# Patient Record
Sex: MALE | Race: ASIAN | Employment: UNEMPLOYED | URBAN - METROPOLITAN AREA
[De-identification: names, ages, dates, MRNs, and addresses within clinical notes are randomized per-mention and may not be internally consistent; named-entity substitution may affect disease eponyms.]

---

## 2017-01-12 ENCOUNTER — LAB ENCOUNTER (OUTPATIENT)
Dept: LAB | Facility: HOSPITAL | Age: 52
End: 2017-01-12
Attending: NURSE PRACTITIONER
Payer: COMMERCIAL

## 2017-01-12 DIAGNOSIS — Z87.19 PERSONAL HISTORY OF DIGESTIVE DISEASE: ICD-10-CM

## 2017-01-12 DIAGNOSIS — E11.9 TYPE II OR UNSPECIFIED TYPE DIABETES MELLITUS WITHOUT MENTION OF COMPLICATION, NOT STATED AS UNCONTROLLED: ICD-10-CM

## 2017-01-12 DIAGNOSIS — R10.9 ABDOMINAL PAIN, UNSPECIFIED SITE: Primary | ICD-10-CM

## 2017-01-12 LAB
ALBUMIN SERPL BCP-MCNC: 3.6 G/DL (ref 3.5–4.8)
ALBUMIN/GLOB SERPL: 1.3 {RATIO} (ref 1–2)
ALP SERPL-CCNC: 60 U/L (ref 32–100)
ALT SERPL-CCNC: 24 U/L (ref 17–63)
AMYLASE SERPL-CCNC: 35 U/L (ref 24–108)
ANION GAP SERPL CALC-SCNC: 7 MMOL/L (ref 0–18)
AST SERPL-CCNC: 19 U/L (ref 15–41)
BASOPHILS # BLD: 0 K/UL (ref 0–0.2)
BASOPHILS NFR BLD: 0 %
BILIRUB SERPL-MCNC: 0.7 MG/DL (ref 0.3–1.2)
BUN SERPL-MCNC: 10 MG/DL (ref 8–20)
BUN/CREAT SERPL: 12.2 (ref 10–20)
CALCIUM SERPL-MCNC: 9.1 MG/DL (ref 8.5–10.5)
CHLORIDE SERPL-SCNC: 106 MMOL/L (ref 95–110)
CHOLEST SERPL-MCNC: 155 MG/DL (ref 110–200)
CO2 SERPL-SCNC: 28 MMOL/L (ref 22–32)
CREAT SERPL-MCNC: 0.82 MG/DL (ref 0.5–1.5)
CREAT UR-MCNC: 206.9 MG/DL
EOSINOPHIL # BLD: 0.2 K/UL (ref 0–0.7)
EOSINOPHIL NFR BLD: 3 %
ERYTHROCYTE [DISTWIDTH] IN BLOOD BY AUTOMATED COUNT: 12.8 % (ref 11–15)
GLOBULIN PLAS-MCNC: 2.8 G/DL (ref 2.5–3.7)
GLUCOSE SERPL-MCNC: 132 MG/DL (ref 70–99)
HCT VFR BLD AUTO: 45 % (ref 41–52)
HDLC SERPL-MCNC: 23 MG/DL
HGB BLD-MCNC: 15.6 G/DL (ref 13.5–17.5)
LDLC SERPL CALC-MCNC: 58 MG/DL (ref 0–99)
LIPASE SERPL-CCNC: 29 U/L (ref 22–51)
LYMPHOCYTES # BLD: 1.9 K/UL (ref 1–4)
LYMPHOCYTES NFR BLD: 36 %
MCH RBC QN AUTO: 31.8 PG (ref 27–32)
MCHC RBC AUTO-ENTMCNC: 34.7 G/DL (ref 32–37)
MCV RBC AUTO: 91.8 FL (ref 80–100)
MICROALBUMIN UR-MCNC: 4.8 MG/DL (ref 0–1.8)
MICROALBUMIN/CREAT UR: 23.2 MG/G{CREAT} (ref 0–20)
MONOCYTES # BLD: 0.6 K/UL (ref 0–1)
MONOCYTES NFR BLD: 10 %
NEUTROPHILS # BLD AUTO: 2.7 K/UL (ref 1.8–7.7)
NEUTROPHILS NFR BLD: 51 %
NONHDLC SERPL-MCNC: 132 MG/DL
OSMOLALITY UR CALC.SUM OF ELEC: 293 MOSM/KG (ref 275–295)
PLATELET # BLD AUTO: 169 K/UL (ref 140–400)
PMV BLD AUTO: 10 FL (ref 7.4–10.3)
POTASSIUM SERPL-SCNC: 4.2 MMOL/L (ref 3.3–5.1)
PROT SERPL-MCNC: 6.4 G/DL (ref 5.9–8.4)
PSA FREE MFR SERPL: 55 %
PSA FREE SERPL-MCNC: 0.11 NG/ML
PSA SERPL-MCNC: 0.2 NG/ML (ref 0–4)
RBC # BLD AUTO: 4.9 M/UL (ref 4.5–5.9)
SODIUM SERPL-SCNC: 141 MMOL/L (ref 136–144)
TRIGL SERPL-MCNC: 371 MG/DL (ref 1–149)
WBC # BLD AUTO: 5.3 K/UL (ref 4–11)

## 2017-01-12 PROCEDURE — 85025 COMPLETE CBC W/AUTO DIFF WBC: CPT

## 2017-01-12 PROCEDURE — 36415 COLL VENOUS BLD VENIPUNCTURE: CPT

## 2017-01-12 PROCEDURE — 80053 COMPREHEN METABOLIC PANEL: CPT

## 2017-01-12 PROCEDURE — 82570 ASSAY OF URINE CREATININE: CPT

## 2017-01-12 PROCEDURE — 82150 ASSAY OF AMYLASE: CPT

## 2017-01-12 PROCEDURE — 84153 ASSAY OF PSA TOTAL: CPT

## 2017-01-12 PROCEDURE — 83690 ASSAY OF LIPASE: CPT

## 2017-01-12 PROCEDURE — 82043 UR ALBUMIN QUANTITATIVE: CPT

## 2017-01-12 PROCEDURE — 84154 ASSAY OF PSA FREE: CPT

## 2017-01-12 PROCEDURE — 80061 LIPID PANEL: CPT

## 2017-01-16 ENCOUNTER — TELEPHONE (OUTPATIENT)
Dept: GASTROENTEROLOGY | Facility: CLINIC | Age: 52
End: 2017-01-16

## 2017-01-16 ENCOUNTER — OFFICE VISIT (OUTPATIENT)
Dept: GASTROENTEROLOGY | Facility: CLINIC | Age: 52
End: 2017-01-16

## 2017-01-16 VITALS
HEART RATE: 58 BPM | SYSTOLIC BLOOD PRESSURE: 130 MMHG | DIASTOLIC BLOOD PRESSURE: 79 MMHG | WEIGHT: 185 LBS | BODY MASS INDEX: 32.78 KG/M2 | HEIGHT: 63 IN

## 2017-01-16 DIAGNOSIS — K85.90 RECURRENT ACUTE PANCREATITIS: Primary | ICD-10-CM

## 2017-01-16 DIAGNOSIS — E78.1 HYPERTRIGLYCERIDEMIA: ICD-10-CM

## 2017-01-16 PROCEDURE — 99214 OFFICE O/P EST MOD 30 MIN: CPT | Performed by: INTERNAL MEDICINE

## 2017-01-16 PROCEDURE — 99212 OFFICE O/P EST SF 10 MIN: CPT | Performed by: INTERNAL MEDICINE

## 2017-01-16 RX ORDER — POLYETHYLENE GLYCOL 3350 17 G/17G
POWDER, FOR SOLUTION ORAL
COMMUNITY
Start: 2017-01-11 | End: 2018-09-17

## 2017-01-16 NOTE — TELEPHONE ENCOUNTER
Per Min Hendrickson, pt is scheduled for MRI/MRCP tomorrow at [facility] at 36, arrival time 1615; pt needs to be NPO for 4 hours prior to exam; he is made aware of this as well as ADO's address; pt is agreeable with plan.

## 2017-01-16 NOTE — PROGRESS NOTES
HPI:    Patient ID: Jignesh Falcon returns today for follow-up. He suffered another acute episode of the pain 1 week ago January 9th.   He describes the abrupt onset of upper abdominal pain, fairly diffuse upper abdominal and periumbilical radiating day.  Does not regularly eat fish. Has stopped eating all meats including chicken. Very attentive to regulating his diet. Some nausea associated with this pain. Sometimes eats dried pierre to relieve the nausea and/or pain.     Pain only occurs postpr atherosclerosis. POSTOPERATIVE DIAGNOSES:  1. Diffuse coronary artery disease. 2. Acute coronary syndrome. 3. Coronary atherosclerosis.      PROCEDURE:  Open heart coronary bypass grafting x 3 with left internal  mammary artery to left anterior descen dissection. RETROPERITONEUM: No mass or enlarged adenopathy.   BOWEL/MESENTERY: Mild residual free fluid in the peripancreatic region adjacent to the descending  duodenum extending into the right anterior pararenal space measures 3.3 x 1.0 cm in  No visibl pancreatitis attributed to hypertriglyceridemia. Mr. Rooney Lanes has been at home on what he believes to be his same list of  medications since the heart surgery. He has been doing his exercises on a  daily basis.   He was doing well up until this morn PHYSICAL EXAMINATION:  GENERAL APPEARANCE:  Reveals a pleasant, jovial gentleman ambulating around  the room. He looks well. He is afebrile since admission. VITAL SIGNS:  Show heart rate 90s on arrival, 70s now with stable blood  pressure.   HEENT EXAM: be rapidly improving. I  would recommend the followin. Clear-liquid diet. 2.  IV fluid hydration as tolerated. 3.  Follow triglycerides, lipids. 4.  Lipid clinic followup for medication monitoring and adjustment after  this admission.   5.  Repeat steatosis. GALLBLADDER: Present. BILIARY: No visible dilatation or calcification. SPLEEN: No enlargement or focal lesion. STOMACH: No gross gastric mass, obstruction or focal abnormality. Duodenum  unremarkable.   PANCREAS: No lesion, fluid collection, limited due to the patient's  immobility. FINDINGS:  GALLBLADDER: Mildly distended. Multiple sludge with possible tiny  embedded stones. No wall thickening or pericholecystic fluid. Sonographic Briceno's sign was not elicited.   BILE DUCTS: Normal. Common infiltration, or hepatic parenchymal disease. Normal liver size. No focal mass. GALLBLADDER:           Normal.  Normal size.  No calculi, wall thickening or pericholecystic fluid.  Sonographic Briceno's sign was not elicited.     BILE DUCTS:    Normal.  Com Prescriptions:  Polyethylene Glycol 3350 Oral Powder  Disp:  Rfl:    lisinopril 10 MG Oral Tab Take 10 mg by mouth once daily. Disp:  Rfl: 0   Atorvastatin Calcium 40 MG Oral Tab Take 1 tablet (40 mg total) by mouth nightly.  Disp: 30 tablet Rfl: 11   Blood shows WBC 5300 hemoglobin 15.6 g, platelets 770,722 normal differential  CMP with glucose 132 normal renal function AST 19 ALT 24 alk phos 60 albumin 3.6    Lipid panel shows total cholesterol 155, triglycerides 371, LDL 58    Similar normal CMP October 13 follows with with Dr. Vince Chinchilla of Cardiology. 2.  Continue current diet and lipid-lowering therapy for the severe dyslipidemia.        Atorvastatin increased to 40 mg daily (from 20mg) November 23, 2016.    3.  I had previously discussed cholecystec

## 2017-01-16 NOTE — TELEPHONE ENCOUNTER
Rosenda Yañez in central scheduling dept at [id number] is contacted re: below; CT is cancelled and she will call back in a few minutes with MRI appt.; pt is agreeable with this and is waiting in waiting room for time/date for MRI.

## 2017-01-17 ENCOUNTER — HOSPITAL ENCOUNTER (OUTPATIENT)
Dept: MRI IMAGING | Age: 52
Discharge: HOME | End: 2017-01-17
Attending: INTERNAL MEDICINE
Payer: COMMERCIAL

## 2017-01-17 DIAGNOSIS — K85.90 RECURRENT ACUTE PANCREATITIS: ICD-10-CM

## 2017-01-24 ENCOUNTER — HOSPITAL ENCOUNTER (OUTPATIENT)
Dept: MRI IMAGING | Facility: HOSPITAL | Age: 52
Discharge: HOME | End: 2017-01-24
Attending: INTERNAL MEDICINE
Payer: COMMERCIAL

## 2017-01-24 DIAGNOSIS — K85.90 RECURRENT ACUTE PANCREATITIS: ICD-10-CM

## 2017-01-24 PROCEDURE — 74183 MRI ABD W/O CNTR FLWD CNTR: CPT

## 2017-01-24 PROCEDURE — A9575 INJ GADOTERATE MEGLUMI 0.1ML: HCPCS | Performed by: INTERNAL MEDICINE

## 2017-01-24 PROCEDURE — 76376 3D RENDER W/INTRP POSTPROCES: CPT

## 2017-02-08 ENCOUNTER — TELEPHONE (OUTPATIENT)
Dept: GASTROENTEROLOGY | Facility: CLINIC | Age: 52
End: 2017-02-08

## 2017-02-09 NOTE — TELEPHONE ENCOUNTER
OK to leave detailed message per patient consent. Left detailed message regarding the message below. Pt told to call back if he had any other questions/concerns.

## 2017-02-10 NOTE — TELEPHONE ENCOUNTER
Pt contacted and reviewed results in detail with pt. Asked specifically regarding kidneys - benign cysts. Reviewed continuing on cholesterol medication.  He is agreeable with plan

## 2017-09-02 ENCOUNTER — LAB ENCOUNTER (OUTPATIENT)
Dept: LAB | Facility: HOSPITAL | Age: 52
End: 2017-09-02
Attending: NURSE PRACTITIONER
Payer: COMMERCIAL

## 2017-09-02 DIAGNOSIS — E78.5 HYPERLIPIDEMIA: Primary | ICD-10-CM

## 2017-09-02 LAB
CHOLEST SERPL-MCNC: 196 MG/DL (ref 110–200)
HDLC SERPL-MCNC: 25 MG/DL
NONHDLC SERPL-MCNC: 171 MG/DL
TRIGL SERPL-MCNC: 729 MG/DL (ref 1–149)

## 2017-09-02 PROCEDURE — 36415 COLL VENOUS BLD VENIPUNCTURE: CPT

## 2017-09-02 PROCEDURE — 80061 LIPID PANEL: CPT

## 2017-09-06 ENCOUNTER — HOSPITAL ENCOUNTER (EMERGENCY)
Facility: HOSPITAL | Age: 52
Discharge: HOME | End: 2017-09-07
Attending: EMERGENCY MEDICINE
Payer: COMMERCIAL

## 2017-09-06 DIAGNOSIS — R10.9 RIGHT FLANK PAIN: Primary | ICD-10-CM

## 2017-09-06 PROCEDURE — 96361 HYDRATE IV INFUSION ADD-ON: CPT

## 2017-09-06 PROCEDURE — 80048 BASIC METABOLIC PNL TOTAL CA: CPT | Performed by: EMERGENCY MEDICINE

## 2017-09-06 PROCEDURE — 83690 ASSAY OF LIPASE: CPT | Performed by: EMERGENCY MEDICINE

## 2017-09-06 PROCEDURE — 80076 HEPATIC FUNCTION PANEL: CPT | Performed by: EMERGENCY MEDICINE

## 2017-09-06 PROCEDURE — 85379 FIBRIN DEGRADATION QUANT: CPT | Performed by: EMERGENCY MEDICINE

## 2017-09-06 PROCEDURE — 96375 TX/PRO/DX INJ NEW DRUG ADDON: CPT

## 2017-09-06 PROCEDURE — 96374 THER/PROPH/DIAG INJ IV PUSH: CPT

## 2017-09-06 PROCEDURE — 85025 COMPLETE CBC W/AUTO DIFF WBC: CPT | Performed by: EMERGENCY MEDICINE

## 2017-09-06 PROCEDURE — 99284 EMERGENCY DEPT VISIT MOD MDM: CPT

## 2017-09-07 ENCOUNTER — APPOINTMENT (OUTPATIENT)
Dept: CT IMAGING | Facility: HOSPITAL | Age: 52
End: 2017-09-07
Attending: EMERGENCY MEDICINE
Payer: COMMERCIAL

## 2017-09-07 VITALS
OXYGEN SATURATION: 99 % | RESPIRATION RATE: 14 BRPM | HEART RATE: 63 BPM | SYSTOLIC BLOOD PRESSURE: 168 MMHG | WEIGHT: 180 LBS | DIASTOLIC BLOOD PRESSURE: 78 MMHG | TEMPERATURE: 97 F | BODY MASS INDEX: 31.89 KG/M2 | HEIGHT: 63 IN

## 2017-09-07 LAB
ALBUMIN SERPL BCP-MCNC: 3.8 G/DL (ref 3.5–4.8)
ALP SERPL-CCNC: 76 U/L (ref 32–100)
ALT SERPL-CCNC: 24 U/L (ref 17–63)
ANION GAP SERPL CALC-SCNC: 5 MMOL/L (ref 0–18)
AST SERPL-CCNC: 20 U/L (ref 15–41)
BASOPHILS # BLD: 0.1 K/UL (ref 0–0.2)
BASOPHILS NFR BLD: 1 %
BILIRUB DIRECT SERPL-MCNC: 0.1 MG/DL (ref 0–0.2)
BILIRUB SERPL-MCNC: 0.4 MG/DL (ref 0.3–1.2)
BILIRUB UR QL: NEGATIVE
BUN SERPL-MCNC: 11 MG/DL (ref 8–20)
BUN/CREAT SERPL: 13.9 (ref 10–20)
CALCIUM SERPL-MCNC: 9.4 MG/DL (ref 8.5–10.5)
CHLORIDE SERPL-SCNC: 101 MMOL/L (ref 95–110)
CLARITY UR: CLEAR
CO2 SERPL-SCNC: 29 MMOL/L (ref 22–32)
COLOR UR: YELLOW
CREAT SERPL-MCNC: 0.79 MG/DL (ref 0.5–1.5)
D DIMER PPP FEU-MCNC: 0.32 MCG/ML (ref ?–0.51)
EOSINOPHIL # BLD: 0.2 K/UL (ref 0–0.7)
EOSINOPHIL NFR BLD: 3 %
ERYTHROCYTE [DISTWIDTH] IN BLOOD BY AUTOMATED COUNT: 12.4 % (ref 11–15)
GLUCOSE SERPL-MCNC: 276 MG/DL (ref 70–99)
GLUCOSE UR-MCNC: >=500 MG/DL
HCT VFR BLD AUTO: 45.9 % (ref 41–52)
HGB BLD-MCNC: 16.2 G/DL (ref 13.5–17.5)
HGB UR QL STRIP.AUTO: NEGATIVE
KETONES UR-MCNC: NEGATIVE MG/DL
LEUKOCYTE ESTERASE UR QL STRIP.AUTO: NEGATIVE
LIPASE SERPL-CCNC: 27 U/L (ref 22–51)
LYMPHOCYTES # BLD: 2.9 K/UL (ref 1–4)
LYMPHOCYTES NFR BLD: 46 %
MCH RBC QN AUTO: 32.5 PG (ref 27–32)
MCHC RBC AUTO-ENTMCNC: 35.2 G/DL (ref 32–37)
MCV RBC AUTO: 92.2 FL (ref 80–100)
MONOCYTES # BLD: 0.4 K/UL (ref 0–1)
MONOCYTES NFR BLD: 6 %
NEUTROPHILS # BLD AUTO: 2.8 K/UL (ref 1.8–7.7)
NEUTROPHILS NFR BLD: 44 %
NITRITE UR QL STRIP.AUTO: NEGATIVE
OSMOLALITY UR CALC.SUM OF ELEC: 289 MOSM/KG (ref 275–295)
PH UR: 6 [PH] (ref 5–8)
PLATELET # BLD AUTO: 183 K/UL (ref 140–400)
PMV BLD AUTO: 9.5 FL (ref 7.4–10.3)
POTASSIUM SERPL-SCNC: 3.9 MMOL/L (ref 3.3–5.1)
PROT SERPL-MCNC: 6.4 G/DL (ref 5.9–8.4)
PROT UR-MCNC: NEGATIVE MG/DL
RBC # BLD AUTO: 4.98 M/UL (ref 4.5–5.9)
RBC #/AREA URNS AUTO: 1 /HPF
SODIUM SERPL-SCNC: 135 MMOL/L (ref 136–144)
SP GR UR STRIP: 1.02 (ref 1–1.03)
UROBILINOGEN UR STRIP-ACNC: <2
VIT C UR-MCNC: NEGATIVE MG/DL
WBC # BLD AUTO: 6.3 K/UL (ref 4–11)
WBC #/AREA URNS AUTO: <1 /HPF

## 2017-09-07 PROCEDURE — 81001 URINALYSIS AUTO W/SCOPE: CPT | Performed by: EMERGENCY MEDICINE

## 2017-09-07 PROCEDURE — 74176 CT ABD & PELVIS W/O CONTRAST: CPT | Performed by: EMERGENCY MEDICINE

## 2017-09-07 RX ORDER — MORPHINE SULFATE 4 MG/ML
4 INJECTION, SOLUTION INTRAMUSCULAR; INTRAVENOUS ONCE
Status: COMPLETED | OUTPATIENT
Start: 2017-09-07 | End: 2017-09-07

## 2017-09-07 RX ORDER — ORPHENADRINE CITRATE 100 MG/1
100 TABLET, EXTENDED RELEASE ORAL 2 TIMES DAILY PRN
Qty: 20 TABLET | Refills: 0 | Status: SHIPPED | OUTPATIENT
Start: 2017-09-07 | End: 2018-09-17 | Stop reason: ALTCHOICE

## 2017-09-07 RX ORDER — ORPHENADRINE CITRATE 30 MG/ML
60 INJECTION INTRAMUSCULAR; INTRAVENOUS ONCE
Status: COMPLETED | OUTPATIENT
Start: 2017-09-07 | End: 2017-09-07

## 2017-09-07 NOTE — ED PROVIDER NOTES
Patient Seen in: [facility] AND [facility] Emergency Department    History   Patient presents with:  Abdomen/Flank Pain (GI/)    Stated Complaint:     HPI    46year old male with multiple past medical problems including diabetes, hypertension, hyperlipidemia, systems reviewed and negative except as noted above. PSFH elements reviewed from today and agreed except as otherwise stated in HPI.     Physical Exam   ED Triage Vitals  BP: (!) 182/72 [09/06/17 2322]  Pulse: 67 [09/06/17 2322]  Resp: 16 [09/06/17 2322] normal limits   BASIC METABOLIC PANEL (8) - Abnormal; Notable for the following:     Glucose 276 (*)     Sodium 135 (*)     All other components within normal limits   CBC W/ DIFFERENTIAL - Abnormal; Notable for the following:     MCH 32.5 (*)     All othe after morphine, urine at bedside is clear/yellow which pt states is improved from earlier in the week. CT shows no signs of acute renal or intra-abdominal involvement.   Patient reports no history of similar pain in the past, on further questioning he did

## 2017-09-24 ENCOUNTER — HOSPITAL ENCOUNTER (EMERGENCY)
Facility: HOSPITAL | Age: 52
Discharge: HOME | End: 2017-09-24
Attending: EMERGENCY MEDICINE
Payer: COMMERCIAL

## 2017-09-24 ENCOUNTER — APPOINTMENT (OUTPATIENT)
Dept: CT IMAGING | Facility: HOSPITAL | Age: 52
End: 2017-09-24
Attending: EMERGENCY MEDICINE
Payer: COMMERCIAL

## 2017-09-24 VITALS
HEIGHT: 63 IN | WEIGHT: 180 LBS | SYSTOLIC BLOOD PRESSURE: 168 MMHG | HEART RATE: 61 BPM | BODY MASS INDEX: 31.89 KG/M2 | OXYGEN SATURATION: 100 % | RESPIRATION RATE: 20 BRPM | DIASTOLIC BLOOD PRESSURE: 78 MMHG | TEMPERATURE: 98 F

## 2017-09-24 DIAGNOSIS — R10.9 FLANK PAIN: Primary | ICD-10-CM

## 2017-09-24 LAB
ALBUMIN SERPL BCP-MCNC: 4 G/DL (ref 3.5–4.8)
ALP SERPL-CCNC: 76 U/L (ref 32–100)
ALT SERPL-CCNC: 21 U/L (ref 17–63)
ANION GAP SERPL CALC-SCNC: 8 MMOL/L (ref 0–18)
AST SERPL-CCNC: 22 U/L (ref 15–41)
BACTERIA UR QL AUTO: NEGATIVE /HPF
BASOPHILS # BLD: 0.1 K/UL (ref 0–0.2)
BASOPHILS NFR BLD: 1 %
BILIRUB DIRECT SERPL-MCNC: 0.2 MG/DL (ref 0–0.2)
BILIRUB SERPL-MCNC: 0.5 MG/DL (ref 0.3–1.2)
BILIRUB UR QL: NEGATIVE
BUN SERPL-MCNC: 7 MG/DL (ref 8–20)
BUN/CREAT SERPL: 10.1 (ref 10–20)
CALCIUM SERPL-MCNC: 9.9 MG/DL (ref 8.5–10.5)
CHLORIDE SERPL-SCNC: 106 MMOL/L (ref 95–110)
CLARITY UR: CLEAR
CO2 SERPL-SCNC: 24 MMOL/L (ref 22–32)
COLOR UR: YELLOW
CREAT SERPL-MCNC: 0.69 MG/DL (ref 0.5–1.5)
EOSINOPHIL # BLD: 0.2 K/UL (ref 0–0.7)
EOSINOPHIL NFR BLD: 2 %
ERYTHROCYTE [DISTWIDTH] IN BLOOD BY AUTOMATED COUNT: 12.2 % (ref 11–15)
GLUCOSE SERPL-MCNC: 181 MG/DL (ref 70–99)
GLUCOSE UR-MCNC: >=500 MG/DL
HCT VFR BLD AUTO: 45.5 % (ref 41–52)
HGB BLD-MCNC: 16 G/DL (ref 13.5–17.5)
HGB UR QL STRIP.AUTO: NEGATIVE
KETONES UR-MCNC: NEGATIVE MG/DL
LEUKOCYTE ESTERASE UR QL STRIP.AUTO: NEGATIVE
LIPASE SERPL-CCNC: 86 U/L (ref 22–51)
LYMPHOCYTES # BLD: 3.7 K/UL (ref 1–4)
LYMPHOCYTES NFR BLD: 46 %
MCH RBC QN AUTO: 32.6 PG (ref 27–32)
MCHC RBC AUTO-ENTMCNC: 35.1 G/DL (ref 32–37)
MCV RBC AUTO: 92.8 FL (ref 80–100)
MONOCYTES # BLD: 0.6 K/UL (ref 0–1)
MONOCYTES NFR BLD: 7 %
NEUTROPHILS # BLD AUTO: 3.5 K/UL (ref 1.8–7.7)
NEUTROPHILS NFR BLD: 44 %
NITRITE UR QL STRIP.AUTO: NEGATIVE
OSMOLALITY UR CALC.SUM OF ELEC: 289 MOSM/KG (ref 275–295)
PH UR: 6 [PH] (ref 5–8)
PLATELET # BLD AUTO: 220 K/UL (ref 140–400)
PMV BLD AUTO: 9.5 FL (ref 7.4–10.3)
POTASSIUM SERPL-SCNC: 4.4 MMOL/L (ref 3.3–5.1)
PROT SERPL-MCNC: 6.9 G/DL (ref 5.9–8.4)
PROT UR-MCNC: NEGATIVE MG/DL
RBC # BLD AUTO: 4.91 M/UL (ref 4.5–5.9)
RBC #/AREA URNS AUTO: 3 /HPF
SODIUM SERPL-SCNC: 138 MMOL/L (ref 136–144)
SP GR UR STRIP: 1.01 (ref 1–1.03)
UROBILINOGEN UR STRIP-ACNC: <2
VIT C UR-MCNC: NEGATIVE MG/DL
WBC # BLD AUTO: 8.1 K/UL (ref 4–11)
WBC #/AREA URNS AUTO: <1 /HPF

## 2017-09-24 PROCEDURE — 83690 ASSAY OF LIPASE: CPT | Performed by: EMERGENCY MEDICINE

## 2017-09-24 PROCEDURE — 74176 CT ABD & PELVIS W/O CONTRAST: CPT | Performed by: EMERGENCY MEDICINE

## 2017-09-24 PROCEDURE — 99284 EMERGENCY DEPT VISIT MOD MDM: CPT

## 2017-09-24 PROCEDURE — 81001 URINALYSIS AUTO W/SCOPE: CPT | Performed by: EMERGENCY MEDICINE

## 2017-09-24 PROCEDURE — 96361 HYDRATE IV INFUSION ADD-ON: CPT

## 2017-09-24 PROCEDURE — 85025 COMPLETE CBC W/AUTO DIFF WBC: CPT | Performed by: EMERGENCY MEDICINE

## 2017-09-24 PROCEDURE — 82272 OCCULT BLD FECES 1-3 TESTS: CPT

## 2017-09-24 PROCEDURE — 80048 BASIC METABOLIC PNL TOTAL CA: CPT | Performed by: EMERGENCY MEDICINE

## 2017-09-24 PROCEDURE — 96374 THER/PROPH/DIAG INJ IV PUSH: CPT

## 2017-09-24 PROCEDURE — 80076 HEPATIC FUNCTION PANEL: CPT | Performed by: EMERGENCY MEDICINE

## 2017-09-24 RX ORDER — SODIUM CHLORIDE 9 MG/ML
125 INJECTION, SOLUTION INTRAVENOUS CONTINUOUS
Status: DISCONTINUED | OUTPATIENT
Start: 2017-09-24 | End: 2017-09-24

## 2017-09-24 RX ORDER — KETOROLAC TROMETHAMINE 30 MG/ML
30 INJECTION, SOLUTION INTRAMUSCULAR; INTRAVENOUS ONCE
Status: COMPLETED | OUTPATIENT
Start: 2017-09-24 | End: 2017-09-24

## 2017-09-24 RX ORDER — TRAMADOL HYDROCHLORIDE 50 MG/1
TABLET ORAL EVERY 4 HOURS PRN
Qty: 12 TABLET | Refills: 0 | Status: SHIPPED | OUTPATIENT
Start: 2017-09-24 | End: 2017-09-27

## 2017-09-24 NOTE — ED PROVIDER NOTES
Patient Seen in: [facility] AND [facility] Emergency Department    History   Patient presents with:  Back Pain (musculoskeletal)    Stated Complaint: Back Pain    HPI    Patient is a 59-year-old male who complains of 3 days of right-sided flank pain radiates daysi vital signs reviewed. All other systems reviewed and negative except as noted above. PSFH elements reviewed from today and agreed except as otherwise stated in HPI.     Physical Exam   ED Triage Vitals [09/24/17 0952]  BP: 150/79  Pulse: 64  Resp: 2 other components within normal limits   URINALYSIS WITH CULTURE REFLEX - Abnormal; Notable for the following:     Glucose Urine >=500 (*)     All other components within normal limits   LIPASE - Abnormal; Notable for the following:     Lipase 86 (*)     Al go at home. He will go on a liquid diet. He will follow-up with his doctor tomorrow. Disposition and Plan     Clinical Impression:  Flank pain  (primary encounter diagnosis)    Disposition:  There is no disposition on file for this visit.     Follow-

## 2017-09-24 NOTE — ED NOTES
Patient to ED with c/o right sided back pain and belly pain/distention. Seen here a few times over the past month for same issue. Patient was taking norflex at home without relief.  Pain 8/10, belly soft, denies nausea/vomiting, but reports some black stool

## 2017-09-24 NOTE — ED INITIAL ASSESSMENT (HPI)
Pt presents to Ed with c/o severe back pain pt states seen her at [facility] for same issue now c/o back pain and feeling bloated .

## 2017-10-27 ENCOUNTER — PRIOR ORIGINAL RECORDS (OUTPATIENT)
Dept: OTHER | Age: 52
End: 2017-10-27

## 2017-11-20 ENCOUNTER — TELEPHONE (OUTPATIENT)
Dept: GASTROENTEROLOGY | Facility: CLINIC | Age: 52
End: 2017-11-20

## 2017-11-20 NOTE — TELEPHONE ENCOUNTER
Dr MADRID [facility], we received a letter from Iris Sotelo requesting a consult pro mo. You last saw pt 1/16/17. Referral states for hx pancreatitis. Could you see pt, and when would you like to add?   I placed records received on your desk, and will contact Kaylan Echols

## 2017-11-21 NOTE — TELEPHONE ENCOUNTER
GI RNs -  Yes, I know Mr Jazmyn Zimmering very well. He is a very dear friend. Okay to add him on for expedited office visit next 2–4 weeks. His triglyceride level was 729 on 9/2/2017. This is the cause of his pancreatitis and problems.   Please ask Access

## 2017-11-22 NOTE — TELEPHONE ENCOUNTER
Dr Chalo Ramirez,    I spoke to the pt.     appt scheduled    Future Appointments  Date Time Provider Yesy Xie   11/27/2017 11:00 AM Rudolph Wong MD [facility] Sharona ALCAZAR   12/22/2017 2:30 PM Rudolph Wong MD [facility] ([facility])  ADO

## 2017-12-22 ENCOUNTER — OFFICE VISIT (OUTPATIENT)
Dept: GASTROENTEROLOGY | Facility: CLINIC | Age: 52
End: 2017-12-22

## 2017-12-22 ENCOUNTER — TELEPHONE (OUTPATIENT)
Dept: GASTROENTEROLOGY | Facility: CLINIC | Age: 52
End: 2017-12-22

## 2017-12-22 VITALS
HEART RATE: 62 BPM | HEIGHT: 63 IN | SYSTOLIC BLOOD PRESSURE: 145 MMHG | DIASTOLIC BLOOD PRESSURE: 79 MMHG | WEIGHT: 183.63 LBS | BODY MASS INDEX: 32.54 KG/M2

## 2017-12-22 DIAGNOSIS — R10.13 ABDOMINAL PAIN, EPIGASTRIC: Primary | ICD-10-CM

## 2017-12-22 DIAGNOSIS — K85.90 ACUTE RECURRENT PANCREATITIS: ICD-10-CM

## 2017-12-22 DIAGNOSIS — K85.90 RECURRENT ACUTE PANCREATITIS: ICD-10-CM

## 2017-12-22 DIAGNOSIS — E78.1 HYPERTRIGLYCERIDEMIA: Primary | ICD-10-CM

## 2017-12-22 PROCEDURE — 99212 OFFICE O/P EST SF 10 MIN: CPT | Performed by: INTERNAL MEDICINE

## 2017-12-22 PROCEDURE — 99214 OFFICE O/P EST MOD 30 MIN: CPT | Performed by: INTERNAL MEDICINE

## 2017-12-22 RX ORDER — ATORVASTATIN CALCIUM 80 MG/1
80 TABLET, FILM COATED ORAL NIGHTLY
Qty: 30 TABLET | Refills: 11 | Status: SHIPPED | OUTPATIENT
Start: 2017-12-22

## 2017-12-22 NOTE — TELEPHONE ENCOUNTER
KRIS:GI Rn's Please call Kit Miller NP at [phone] for Plavix advise to hold 5 days prior to his procedure on 1/9/18 and Insulin Adjustment to  Half dose. Thank you.

## 2017-12-22 NOTE — PATIENT INSTRUCTIONS
Schedule EGD (stomach endoscopy) exam at [address] [place] [address]    This patient IS NOT appropriate for the [facility] endoscopy center.     IV sedation (conscious sedation)    DX = epigastric pain    Medication instructions:    Hold non-metformin diabetic oral meds X 2 da

## 2017-12-22 NOTE — PROGRESS NOTES
no h/o head & neck cancers  no h/o difficult intubations  no h/o malignant hyperthermia  no psuedocholinesterase deficiencies  no h/o active illicit drug use/opioid abuse  no home O2  no h/o sleep apnea  no active alcoholics   no dialysis patients  yes s

## 2017-12-22 NOTE — TELEPHONE ENCOUNTER
Scheduled for:  Yessenia Evangelista 399  Provider Name: Shannan Joseph  Date:  1/9/18  Location:  [facility]  Sedation:  IVCS  Time:  0730 / arrival 0630  Prep: Npo after Midnight  Meds/Allergies Reconciled?:   Physician reviewed  Diagnosis with codes:  Epigastric abdominal pain R1

## 2017-12-22 NOTE — PROGRESS NOTES
HPI:    Patient ID: Eboni Wiggins returns today for follow-up. Mr. Nieves Leon has suffered another bout of severe abdominal pain in September 2017.   He was seen twice in the ED September 6th and September 24th, 2017 as per labs and CT scans below transaminases repeatedly normal; ALT ranging from 18–24, AST 19–22 on repeated labs.     Recent lipase values:  Lipase 33 on 10/13/2016  Lipase 29 on 1/12/2017  Lipase 27 on 9/6/2017  Lipase 86 on 9/24/2017    Recent triglyceride levels:   Triglycerides 371 symptoms. He has been suffering diffuse upper abdominal pain, longer episodes, aggravated by eating recently. Since mid-October, he describes upper and epigastric abdominal pain which radiates to the back and seems to wax and wane.   This relates to eatin surgery will be an issue.    ============================================  Patient:   Maverick John. #:   [id number]                    Room: Humphrey Ochoa MD #:  [id number]     ADMITTED:   08/17/2015        CARDIOVASCULAR SURGERY OPERATIVE REPORT: space with overall marked  improvement. No abnormal intrinsic pancreatic fluid collections. No lesion, ductal  dilatation, or atrophy. ADRENALS: No defined mass or abnormal enlargement. KIDNEYS: No hydroureteronephrosis or urinary calcifications.  Benign PRESENT ILLNESS:  Mr. Erica Cowan is a 51-year-old  gentleman with history of dyslipidemia and coronary artery disease, just  recovering from recent cardiac revascularization surgery August 17, 2015.   It sounds like he did well and made a steady abhilash tobacco or alcohol use. Mr. Yared Bond was previously  a smoker. Lives with his family. His wife and son are at the bedside. FAMILY HISTORY:  Noncontributory. REVIEW OF SYSTEMS:  No further fevers. Nausea but no vomiting.   No chest  pain or co the gallbladder.      ASSESSMENT AND PLAN:  [age]-year-old gentleman with history of type 2  diabetes, dyslipidemia and hypertriglyceridemia, coronary artery disease  and recent cardiac revascularization surgery August 17, 2015, who presents  with acut required a Dilaudid pain pump to   control his  pain. Three days after admission, his pain is essentially unchanged   today. He  states that between doses of the Dilaudid from his pain pump, the   pain is  just as severe as when he came in.   Dr. Bonilla Kaufman and moist.     LABORATORY:  Significant for a mild elevation in lipase on   admission and  consistently elevated serum glucose levels between 100 and 253. Serum  triglycerides were 6120 on admission, 503 yesterday.   CMP on   admission  showed BUN of 8, c Triglycerides remain greater   than 500 on  clear liquid diet. 5. Consider inpatient or outpatient cardiology lipid clinic   consultation  regarding lipid management. This patient's medical coverage may   limit his  option for brand name drugs.   He will collection, ductal dilatation, or atrophy. ADRENALS: No mass or enlargement. KIDNEYS: Symmetric nephrograms. 2.4 x 2.1 cm low-density mass in the lower pole of the  right kidney, likely a cyst. Ultrasound could confirm.  No intrarenal dilatation to  sugge Complete renal ultrasound recommended  when the patient's condition permits. CONCLUSION:  1. Gallbladder is mildly distended, filled with sludge and possible tiny  embedded stones. No biliary distention. 2. Hepatic steatosis.   3. Nonvisualization of th shadowing stone. Normal echotexture. Left kidney is normal measuring 11.2 cm in longitudinal length.   AORTA/VASCULAR:     Normal.  No aneurysm.  Patent IVC.     OTHER:           Negative.  No ascites or adenopathy seen.         Dictated by (CST): Mark Arguello signal without enhancing lesion, atrophy, or evidence of acute pancreatitis. No significant peripancreatic fluid or inflammation. No pseudocyst.     OTHER FINDINGS:          LIVER:  Liver has normal size and contour.   No significant intrahepatic biliary urination, gross hematuria, and dysuria for 1 week. FINDINGS:  COMMENT:       Evaluation of the vasculature and of the abdominal viscera for the presence of intraparenchymal pathology is suboptimal in the absence of contrast infusion.   LUNG BASES: ventral abdominal subcutaneous fat. Apparently, mild subcutaneous edema is observed. There are small bilateral fat containing inguinal hernias. OTHER:             No free air or fluid is seen in the abdomen or pelvis.           Dictated by (CST): Linn Yan hydroureter. No perinephric or periureteric fat stranding is appreciated.                  At the inferior pole of the right kidney, there is reidentification of a 2.1 x 2.5 cm probable cyst.  URINARY BLADDER:    Incompletely distended without visible calcu acute intra-abdominal process is identified. The etiology of the patient's symptoms is unclear from this noncontrast study. 3. Hepatomegaly with underlying hepatic steatosis.      4. Partial visualization of postthoracotomy chest.     5. Lesser incident Cardiovascular: Normal rate and regular rhythm. Pulmonary/Chest: Effort normal and breath sounds normal.   Abdominal: Soft. Bowel sounds are normal. He exhibits no distension. There is no tenderness.    No tenderness today 12/22/2017 epigastrium or upp pancreatitis has included previous imaging, 2 previous EGD examinations. He presents for follow-up of at least his second episode of acute pancreatitis. Risks for the acute pancreatitis include hypertriglyceridemia and ?? cholelithiasis/sludge described medication, call us should those occur. 3.  I had previously discussed cholecystectomy surgery. Recent ultrasound, MRI/MRCP is not showing gallstones or sludge this time.   A single ultrasound raises the question of gallbladder sludge, which was not see

## 2017-12-28 PROBLEM — K85.90 ACUTE RECURRENT PANCREATITIS: Status: ACTIVE | Noted: 2017-12-28

## 2017-12-29 NOTE — TELEPHONE ENCOUNTER
Left message on the nurse line at [facility] for someone to call me back

## 2018-01-03 ENCOUNTER — PRIOR ORIGINAL RECORDS (OUTPATIENT)
Dept: OTHER | Age: 53
End: 2018-01-03

## 2018-01-03 NOTE — TELEPHONE ENCOUNTER
I spoke to the pt. He was advised to stop  Plavix for 5 days prior to his EGD and to hold the Metformin for 2 days, the day before and the day of the procedure. He will take half the insulin dose the morning of the procedure.     Dr Arielle March's nurse wi

## 2018-01-03 NOTE — TELEPHONE ENCOUNTER
Dr Melodie Mitchell,    I am having an extremely difficult time getting a hold of a person to actually speak to at the pt's primary care office.     Can you please advise on Plavix orders and Insulin orders for prior to EGD on 01/09/17

## 2018-01-03 NOTE — TELEPHONE ENCOUNTER
I spoke to Tanesha the triage nurse at [facility] regarding this pt's insulin orders prior to the EGD. Pt should take half the insulin dose prior to procedure Tanesha  will call me back.

## 2018-01-05 ENCOUNTER — PRIOR ORIGINAL RECORDS (OUTPATIENT)
Dept: OTHER | Age: 53
End: 2018-01-05

## 2018-01-05 NOTE — TELEPHONE ENCOUNTER
LVM on pt cell regarding the time of his Procedure was moved to 1045 am on 1/9/18 per Annel in Endo . See Previous Documentation below.       Scheduled for:  Nabil Charles [id number]  Provider Name: Kinsey Chew  Date:  1/9/18  Location:  [facility]  Sedation:  IVCS  Time:  From 0730

## 2018-01-08 NOTE — TELEPHONE ENCOUNTER
Received a faxed stating pt should hold Plavix for five days prior to his procedure per Dr Therese Ames. I spoke to the pt. He has been holding his Plavix.     He is holding his Metformin and is taking half of his insulin tomorrow

## 2018-01-09 ENCOUNTER — SURGERY (OUTPATIENT)
Age: 53
End: 2018-01-09

## 2018-01-09 ENCOUNTER — HOSPITAL ENCOUNTER (OUTPATIENT)
Facility: HOSPITAL | Age: 53
Setting detail: HOSPITAL OUTPATIENT SURGERY
Discharge: HOME | End: 2018-01-09
Attending: INTERNAL MEDICINE | Admitting: INTERNAL MEDICINE
Payer: COMMERCIAL

## 2018-01-09 DIAGNOSIS — R10.13 ABDOMINAL PAIN, EPIGASTRIC: ICD-10-CM

## 2018-01-09 LAB — GLUCOSE BLDC GLUCOMTR-MCNC: 194 MG/DL (ref 70–99)

## 2018-01-09 PROCEDURE — 99152 MOD SED SAME PHYS/QHP 5/>YRS: CPT | Performed by: INTERNAL MEDICINE

## 2018-01-09 PROCEDURE — 43239 EGD BIOPSY SINGLE/MULTIPLE: CPT | Performed by: INTERNAL MEDICINE

## 2018-01-09 PROCEDURE — 0DB68ZX EXCISION OF STOMACH, VIA NATURAL OR ARTIFICIAL OPENING ENDOSCOPIC, DIAGNOSTIC: ICD-10-PCS | Performed by: INTERNAL MEDICINE

## 2018-01-09 RX ORDER — MIDAZOLAM HYDROCHLORIDE 1 MG/ML
INJECTION INTRAMUSCULAR; INTRAVENOUS
Status: DISCONTINUED | OUTPATIENT
Start: 2018-01-09 | End: 2018-01-09

## 2018-01-09 RX ORDER — SODIUM CHLORIDE, SODIUM LACTATE, POTASSIUM CHLORIDE, CALCIUM CHLORIDE 600; 310; 30; 20 MG/100ML; MG/100ML; MG/100ML; MG/100ML
INJECTION, SOLUTION INTRAVENOUS CONTINUOUS
Status: DISCONTINUED | OUTPATIENT
Start: 2018-01-09 | End: 2018-01-09

## 2018-01-09 RX ORDER — SODIUM CHLORIDE 0.9 % (FLUSH) 0.9 %
10 SYRINGE (ML) INJECTION AS NEEDED
Status: DISCONTINUED | OUTPATIENT
Start: 2018-01-09 | End: 2018-01-09

## 2018-01-09 RX ORDER — MIDAZOLAM HYDROCHLORIDE 1 MG/ML
1 INJECTION INTRAMUSCULAR; INTRAVENOUS EVERY 5 MIN PRN
Status: DISCONTINUED | OUTPATIENT
Start: 2018-01-09 | End: 2018-01-09

## 2018-01-09 NOTE — H&P
History & Physical Examination    Patient Name: Luigi Dyer  MRN: [id number]  [facility]: [id number]  YOB: 1965    Diagnosis: Epigastric abd pain, dyspepsia    Present Illness: Elevated BMI 32, metabolic syndrome with hypertension ty Intravenous Q5 Min PRN   Midazolam HCl (VERSED) 2 MG/2ML injection   PRN   fentaNYL citrate (SUBLIMAZE) 0.05 MG/ML injection   PRN       Allergies: No Known Allergies    Past Medical History:   Diagnosis Date   • Asthma    • Bell's palsy 2013   • Coronary

## 2018-01-09 NOTE — OPERATIVE REPORT
EGD PROCEDURE REPORT    DATE OF PROCEDURE:  1/9/2018    PCP: oHney Jones NP     PREOPERATIVE DIAGNOSIS:  Epigastric abd pain, dyspepsia     POSTOPERATIVE DIAGNOSIS:  See impression. SURGEON:  DARYN Woods

## 2018-01-10 VITALS
HEIGHT: 63 IN | RESPIRATION RATE: 16 BRPM | DIASTOLIC BLOOD PRESSURE: 81 MMHG | SYSTOLIC BLOOD PRESSURE: 165 MMHG | OXYGEN SATURATION: 99 % | HEART RATE: 84 BPM | BODY MASS INDEX: 31.89 KG/M2 | WEIGHT: 180 LBS

## 2018-01-10 LAB — CLO TEST: NEGATIVE

## 2018-01-24 ENCOUNTER — TELEPHONE (OUTPATIENT)
Dept: GASTROENTEROLOGY | Facility: CLINIC | Age: 53
End: 2018-01-24

## 2018-04-06 ENCOUNTER — LAB ENCOUNTER (OUTPATIENT)
Dept: LAB | Facility: HOSPITAL | Age: 53
End: 2018-04-06
Attending: INTERNAL MEDICINE
Payer: COMMERCIAL

## 2018-04-06 DIAGNOSIS — I10 ESSENTIAL HYPERTENSION, MALIGNANT: ICD-10-CM

## 2018-04-06 DIAGNOSIS — E78.5 HYPERLIPEMIA: ICD-10-CM

## 2018-04-06 DIAGNOSIS — I25.10 CORONARY ATHEROSCLEROSIS OF NATIVE CORONARY ARTERY: ICD-10-CM

## 2018-04-06 DIAGNOSIS — E11.9 DIABETES MELLITUS (HCC): ICD-10-CM

## 2018-04-06 DIAGNOSIS — N28.1 ACQUIRED CYST OF KIDNEY: Primary | ICD-10-CM

## 2018-04-06 PROCEDURE — 82570 ASSAY OF URINE CREATININE: CPT

## 2018-04-06 PROCEDURE — 36415 COLL VENOUS BLD VENIPUNCTURE: CPT

## 2018-04-06 PROCEDURE — 81001 URINALYSIS AUTO W/SCOPE: CPT

## 2018-04-06 PROCEDURE — 84156 ASSAY OF PROTEIN URINE: CPT

## 2018-04-06 PROCEDURE — 82043 UR ALBUMIN QUANTITATIVE: CPT

## 2018-04-06 PROCEDURE — 80048 BASIC METABOLIC PNL TOTAL CA: CPT

## 2018-04-27 ENCOUNTER — PRIOR ORIGINAL RECORDS (OUTPATIENT)
Dept: OTHER | Age: 53
End: 2018-04-27

## 2018-04-27 ENCOUNTER — MYAURORA ACCOUNT LINK (OUTPATIENT)
Dept: OTHER | Age: 53
End: 2018-04-27

## 2018-07-30 ENCOUNTER — PRIOR ORIGINAL RECORDS (OUTPATIENT)
Dept: OTHER | Age: 53
End: 2018-07-30

## 2018-09-17 ENCOUNTER — HOSPITAL ENCOUNTER (INPATIENT)
Facility: HOSPITAL | Age: 53
LOS: 2 days | Discharge: HOME | DRG: 247 | End: 2018-09-19
Attending: EMERGENCY MEDICINE | Admitting: HOSPITALIST
Payer: COMMERCIAL

## 2018-09-17 ENCOUNTER — APPOINTMENT (OUTPATIENT)
Dept: GENERAL RADIOLOGY | Facility: HOSPITAL | Age: 53
DRG: 247 | End: 2018-09-17
Attending: EMERGENCY MEDICINE
Payer: COMMERCIAL

## 2018-09-17 ENCOUNTER — APPOINTMENT (OUTPATIENT)
Dept: INTERVENTIONAL RADIOLOGY/VASCULAR | Facility: HOSPITAL | Age: 53
DRG: 247 | End: 2018-09-17
Attending: INTERNAL MEDICINE
Payer: COMMERCIAL

## 2018-09-17 DIAGNOSIS — R07.9 ACUTE CHEST PAIN: ICD-10-CM

## 2018-09-17 DIAGNOSIS — I21.4 NSTEMI (NON-ST ELEVATED MYOCARDIAL INFARCTION) (HCC): Primary | ICD-10-CM

## 2018-09-17 LAB
ANION GAP SERPL CALC-SCNC: 9 MMOL/L (ref 0–18)
APTT PPP: 32 SECONDS (ref 23.2–35.3)
BASOPHILS # BLD: 0.1 K/UL (ref 0–0.2)
BASOPHILS NFR BLD: 1 %
BUN SERPL-MCNC: 11 MG/DL (ref 8–20)
BUN/CREAT SERPL: 14.3 (ref 10–20)
CALCIUM SERPL-MCNC: 10.4 MG/DL (ref 8.5–10.5)
CHLORIDE SERPL-SCNC: 104 MMOL/L (ref 95–110)
CHOLEST SERPL-MCNC: 206 MG/DL (ref 110–200)
CO2 SERPL-SCNC: 23 MMOL/L (ref 22–32)
CREAT SERPL-MCNC: 0.77 MG/DL (ref 0.5–1.5)
EOSINOPHIL # BLD: 0.2 K/UL (ref 0–0.7)
EOSINOPHIL NFR BLD: 3 %
ERYTHROCYTE [DISTWIDTH] IN BLOOD BY AUTOMATED COUNT: 12.2 % (ref 11–15)
GLUCOSE BLDC GLUCOMTR-MCNC: 156 MG/DL (ref 70–99)
GLUCOSE BLDC GLUCOMTR-MCNC: 214 MG/DL (ref 70–99)
GLUCOSE BLDC GLUCOMTR-MCNC: 230 MG/DL (ref 70–99)
GLUCOSE BLDC GLUCOMTR-MCNC: 277 MG/DL (ref 70–99)
GLUCOSE BLDC GLUCOMTR-MCNC: 290 MG/DL (ref 70–99)
GLUCOSE SERPL-MCNC: 269 MG/DL (ref 70–99)
HBA1C MFR BLD: 8.4 % (ref 4–6)
HCT VFR BLD AUTO: 46.5 % (ref 41–52)
HDLC SERPL-MCNC: 30 MG/DL
HGB BLD-MCNC: 16.2 G/DL (ref 13.5–17.5)
LDLC SERPL DIRECT ASSAY-MCNC: 80 MG/DL (ref 0–99)
LYMPHOCYTES # BLD: 3.4 K/UL (ref 1–4)
LYMPHOCYTES NFR BLD: 46 %
MCH RBC QN AUTO: 32.2 PG (ref 27–32)
MCHC RBC AUTO-ENTMCNC: 34.8 G/DL (ref 32–37)
MCV RBC AUTO: 92.3 FL (ref 80–100)
MONOCYTES # BLD: 0.4 K/UL (ref 0–1)
MONOCYTES NFR BLD: 6 %
NEUTROPHILS # BLD AUTO: 3.2 K/UL (ref 1.8–7.7)
NEUTROPHILS NFR BLD: 44 %
NONHDLC SERPL-MCNC: 176 MG/DL
OSMOLALITY UR CALC.SUM OF ELEC: 291 MOSM/KG (ref 275–295)
PLATELET # BLD AUTO: 199 K/UL (ref 140–400)
PMV BLD AUTO: 9.7 FL (ref 7.4–10.3)
POTASSIUM SERPL-SCNC: 4.2 MMOL/L (ref 3.3–5.1)
RBC # BLD AUTO: 5.03 M/UL (ref 4.5–5.9)
SODIUM SERPL-SCNC: 136 MMOL/L (ref 136–144)
TRIGL SERPL-MCNC: 948 MG/DL (ref 1–149)
TROPONIN I SERPL-MCNC: 0.27 NG/ML (ref ?–0.03)
TROPONIN I SERPL-MCNC: 0.37 NG/ML (ref ?–0.03)
WBC # BLD AUTO: 7.3 K/UL (ref 4–11)

## 2018-09-17 PROCEDURE — 80061 LIPID PANEL: CPT | Performed by: EMERGENCY MEDICINE

## 2018-09-17 PROCEDURE — 93010 ELECTROCARDIOGRAM REPORT: CPT | Performed by: EMERGENCY MEDICINE

## 2018-09-17 PROCEDURE — 027034Z DILATION OF CORONARY ARTERY, ONE ARTERY WITH DRUG-ELUTING INTRALUMINAL DEVICE, PERCUTANEOUS APPROACH: ICD-10-PCS | Performed by: INTERNAL MEDICINE

## 2018-09-17 PROCEDURE — 96365 THER/PROPH/DIAG IV INF INIT: CPT

## 2018-09-17 PROCEDURE — 93010 ELECTROCARDIOGRAM REPORT: CPT | Performed by: HOSPITALIST

## 2018-09-17 PROCEDURE — 99153 MOD SED SAME PHYS/QHP EA: CPT

## 2018-09-17 PROCEDURE — 99292 CRITICAL CARE ADDL 30 MIN: CPT

## 2018-09-17 PROCEDURE — 85025 COMPLETE CBC W/AUTO DIFF WBC: CPT | Performed by: EMERGENCY MEDICINE

## 2018-09-17 PROCEDURE — B2111ZZ FLUOROSCOPY OF MULTIPLE CORONARY ARTERIES USING LOW OSMOLAR CONTRAST: ICD-10-PCS | Performed by: INTERNAL MEDICINE

## 2018-09-17 PROCEDURE — 85730 THROMBOPLASTIN TIME PARTIAL: CPT | Performed by: EMERGENCY MEDICINE

## 2018-09-17 PROCEDURE — 93459 L HRT ART/GRFT ANGIO: CPT

## 2018-09-17 PROCEDURE — 84484 ASSAY OF TROPONIN QUANT: CPT | Performed by: EMERGENCY MEDICINE

## 2018-09-17 PROCEDURE — 71045 X-RAY EXAM CHEST 1 VIEW: CPT | Performed by: EMERGENCY MEDICINE

## 2018-09-17 PROCEDURE — 96366 THER/PROPH/DIAG IV INF ADDON: CPT

## 2018-09-17 PROCEDURE — 93010 ELECTROCARDIOGRAM REPORT: CPT | Performed by: INTERNAL MEDICINE

## 2018-09-17 PROCEDURE — B41F1ZZ FLUOROSCOPY OF RIGHT LOWER EXTREMITY ARTERIES USING LOW OSMOLAR CONTRAST: ICD-10-PCS | Performed by: INTERNAL MEDICINE

## 2018-09-17 PROCEDURE — 82962 GLUCOSE BLOOD TEST: CPT

## 2018-09-17 PROCEDURE — 93005 ELECTROCARDIOGRAM TRACING: CPT

## 2018-09-17 PROCEDURE — 99291 CRITICAL CARE FIRST HOUR: CPT

## 2018-09-17 PROCEDURE — 83721 ASSAY OF BLOOD LIPOPROTEIN: CPT | Performed by: EMERGENCY MEDICINE

## 2018-09-17 PROCEDURE — 99152 MOD SED SAME PHYS/QHP 5/>YRS: CPT

## 2018-09-17 PROCEDURE — 84484 ASSAY OF TROPONIN QUANT: CPT | Performed by: HOSPITALIST

## 2018-09-17 PROCEDURE — B2151ZZ FLUOROSCOPY OF LEFT HEART USING LOW OSMOLAR CONTRAST: ICD-10-PCS | Performed by: INTERNAL MEDICINE

## 2018-09-17 PROCEDURE — C9460 INJECTION, CANGRELOR: HCPCS | Performed by: INTERNAL MEDICINE

## 2018-09-17 PROCEDURE — 80048 BASIC METABOLIC PNL TOTAL CA: CPT | Performed by: EMERGENCY MEDICINE

## 2018-09-17 PROCEDURE — 92937 PRQ TRLUML REVSC CAB GRF 1: CPT

## 2018-09-17 PROCEDURE — 4A023N7 MEASUREMENT OF CARDIAC SAMPLING AND PRESSURE, LEFT HEART, PERCUTANEOUS APPROACH: ICD-10-PCS | Performed by: INTERNAL MEDICINE

## 2018-09-17 PROCEDURE — B2131ZZ FLUOROSCOPY OF MULTIPLE CORONARY ARTERY BYPASS GRAFTS USING LOW OSMOLAR CONTRAST: ICD-10-PCS | Performed by: INTERNAL MEDICINE

## 2018-09-17 PROCEDURE — A4216 STERILE WATER/SALINE, 10 ML: HCPCS | Performed by: HOSPITALIST

## 2018-09-17 PROCEDURE — 83036 HEMOGLOBIN GLYCOSYLATED A1C: CPT | Performed by: HOSPITALIST

## 2018-09-17 PROCEDURE — C9460 INJECTION, CANGRELOR: HCPCS

## 2018-09-17 RX ORDER — METOPROLOL TARTRATE 50 MG/1
50 TABLET, FILM COATED ORAL DAILY
Status: DISCONTINUED | OUTPATIENT
Start: 2018-09-17 | End: 2018-09-18

## 2018-09-17 RX ORDER — TRAMADOL HYDROCHLORIDE 50 MG/1
50 TABLET ORAL EVERY 6 HOURS PRN
Status: DISCONTINUED | OUTPATIENT
Start: 2018-09-17 | End: 2018-09-19

## 2018-09-17 RX ORDER — HEPARIN SODIUM 1000 [USP'U]/ML
INJECTION, SOLUTION INTRAVENOUS; SUBCUTANEOUS
Status: DISCONTINUED
Start: 2018-09-17 | End: 2018-09-17 | Stop reason: WASHOUT

## 2018-09-17 RX ORDER — CLOPIDOGREL BISULFATE 75 MG/1
TABLET ORAL
Status: COMPLETED
Start: 2018-09-17 | End: 2018-09-17

## 2018-09-17 RX ORDER — ASPIRIN 81 MG/1
324 TABLET, CHEWABLE ORAL ONCE
Status: DISCONTINUED | OUTPATIENT
Start: 2018-09-17 | End: 2018-09-19

## 2018-09-17 RX ORDER — SODIUM CHLORIDE 0.9 % (FLUSH) 0.9 %
3 SYRINGE (ML) INJECTION AS NEEDED
Status: DISCONTINUED | OUTPATIENT
Start: 2018-09-17 | End: 2018-09-19

## 2018-09-17 RX ORDER — ASPIRIN 81 MG/1
324 TABLET, CHEWABLE ORAL ONCE
Status: COMPLETED | OUTPATIENT
Start: 2018-09-17 | End: 2018-09-17

## 2018-09-17 RX ORDER — HEPARIN SODIUM 1000 [USP'U]/ML
60 INJECTION, SOLUTION INTRAVENOUS; SUBCUTANEOUS ONCE
Status: COMPLETED | OUTPATIENT
Start: 2018-09-17 | End: 2018-09-17

## 2018-09-17 RX ORDER — LISINOPRIL 10 MG/1
10 TABLET ORAL
Status: DISCONTINUED | OUTPATIENT
Start: 2018-09-17 | End: 2018-09-19

## 2018-09-17 RX ORDER — MAGNESIUM HYDROXIDE/ALUMINUM HYDROXICE/SIMETHICONE 120; 1200; 1200 MG/30ML; MG/30ML; MG/30ML
30 SUSPENSION ORAL ONCE
Status: COMPLETED | OUTPATIENT
Start: 2018-09-17 | End: 2018-09-17

## 2018-09-17 RX ORDER — DEXTROSE MONOHYDRATE 25 G/50ML
50 INJECTION, SOLUTION INTRAVENOUS AS NEEDED
Status: DISCONTINUED | OUTPATIENT
Start: 2018-09-17 | End: 2018-09-19

## 2018-09-17 RX ORDER — LIDOCAINE HYDROCHLORIDE 20 MG/ML
INJECTION, SOLUTION EPIDURAL; INFILTRATION; INTRACAUDAL; PERINEURAL
Status: COMPLETED
Start: 2018-09-17 | End: 2018-09-17

## 2018-09-17 RX ORDER — MIDAZOLAM HYDROCHLORIDE 1 MG/ML
INJECTION INTRAMUSCULAR; INTRAVENOUS
Status: COMPLETED
Start: 2018-09-17 | End: 2018-09-17

## 2018-09-17 RX ORDER — SODIUM CHLORIDE 9 MG/ML
INJECTION, SOLUTION INTRAVENOUS CONTINUOUS
Status: ACTIVE | OUTPATIENT
Start: 2018-09-17 | End: 2018-09-17

## 2018-09-17 RX ORDER — NITROGLYCERIN 20 MG/100ML
INJECTION INTRAVENOUS
Status: COMPLETED
Start: 2018-09-17 | End: 2018-09-17

## 2018-09-17 RX ORDER — FAMOTIDINE 20 MG/1
20 TABLET ORAL 2 TIMES DAILY
Status: DISCONTINUED | OUTPATIENT
Start: 2018-09-17 | End: 2018-09-19

## 2018-09-17 RX ORDER — ASPIRIN 81 MG/1
81 TABLET ORAL DAILY
Status: DISCONTINUED | OUTPATIENT
Start: 2018-09-17 | End: 2018-09-19

## 2018-09-17 RX ORDER — ASPIRIN 81 MG/1
81 TABLET, CHEWABLE ORAL DAILY
Status: DISCONTINUED | OUTPATIENT
Start: 2018-09-17 | End: 2018-09-17

## 2018-09-17 RX ORDER — ACETAMINOPHEN 325 MG/1
650 TABLET ORAL EVERY 6 HOURS PRN
Status: DISCONTINUED | OUTPATIENT
Start: 2018-09-17 | End: 2018-09-19

## 2018-09-17 RX ORDER — SODIUM CHLORIDE 9 MG/ML
INJECTION, SOLUTION INTRAVENOUS
Status: COMPLETED
Start: 2018-09-17 | End: 2018-09-17

## 2018-09-17 RX ORDER — HEPARIN SODIUM AND DEXTROSE 10000; 5 [USP'U]/100ML; G/100ML
12 INJECTION INTRAVENOUS ONCE
Status: COMPLETED | OUTPATIENT
Start: 2018-09-17 | End: 2018-09-17

## 2018-09-17 RX ORDER — FENOFIBRATE 134 MG/1
134 CAPSULE ORAL
Status: DISCONTINUED | OUTPATIENT
Start: 2018-09-17 | End: 2018-09-19

## 2018-09-17 RX ORDER — SODIUM CHLORIDE 9 MG/ML
INJECTION, SOLUTION INTRAVENOUS CONTINUOUS
Status: DISCONTINUED | OUTPATIENT
Start: 2018-09-17 | End: 2018-09-19

## 2018-09-17 RX ORDER — CHLORHEXIDINE GLUCONATE 4 G/100ML
30 SOLUTION TOPICAL
Status: ACTIVE | OUTPATIENT
Start: 2018-09-18 | End: 2018-09-18

## 2018-09-17 RX ORDER — ONDANSETRON 2 MG/ML
4 INJECTION INTRAMUSCULAR; INTRAVENOUS EVERY 4 HOURS PRN
Status: DISCONTINUED | OUTPATIENT
Start: 2018-09-17 | End: 2018-09-19

## 2018-09-17 RX ORDER — TEMAZEPAM 15 MG/1
15 CAPSULE ORAL NIGHTLY PRN
Status: DISCONTINUED | OUTPATIENT
Start: 2018-09-17 | End: 2018-09-19

## 2018-09-17 RX ORDER — HEPARIN SODIUM AND DEXTROSE 10000; 5 [USP'U]/100ML; G/100ML
INJECTION INTRAVENOUS CONTINUOUS
Status: DISCONTINUED | OUTPATIENT
Start: 2018-09-17 | End: 2018-09-19

## 2018-09-17 RX ORDER — ASPIRIN 81 MG/1
81 TABLET, CHEWABLE ORAL DAILY
Status: DISCONTINUED | OUTPATIENT
Start: 2018-09-18 | End: 2018-09-19

## 2018-09-17 RX ORDER — ATORVASTATIN CALCIUM 40 MG/1
80 TABLET, FILM COATED ORAL NIGHTLY
Status: DISCONTINUED | OUTPATIENT
Start: 2018-09-17 | End: 2018-09-19

## 2018-09-17 RX ORDER — NITROGLYCERIN 0.4 MG/1
0.4 TABLET SUBLINGUAL EVERY 5 MIN PRN
Status: DISCONTINUED | OUTPATIENT
Start: 2018-09-17 | End: 2018-09-19

## 2018-09-17 RX ORDER — CLOPIDOGREL BISULFATE 75 MG/1
75 TABLET ORAL
Status: DISCONTINUED | OUTPATIENT
Start: 2018-09-17 | End: 2018-09-17

## 2018-09-17 RX ORDER — ONDANSETRON 2 MG/ML
4 INJECTION INTRAMUSCULAR; INTRAVENOUS EVERY 6 HOURS PRN
Status: DISCONTINUED | OUTPATIENT
Start: 2018-09-17 | End: 2018-09-19

## 2018-09-17 NOTE — ED PROVIDER NOTES
Patient Seen in: [facility] AND [facility] Emergency Department    History   Patient presents with:  Chest Pain Angina (cardiovascular)      HPI    Patient presents to the ED complaining of left anterior chest pain that started yesterday afternoon and resolved w Sister    • Lipids Sister    • Heart Disease Brother    • Diabetes Brother    • Hypertension Brother    • Lipids Brother        Smoking Status: Social History    Socioeconomic History      Marital status:       Spouse name: Not on file      Number o O2 Device None (Room air)       Physical Exam   Constitutional: He is oriented to person, place, and time. He appears well-developed and well-nourished. No distress. HENT:   Head: Normocephalic and atraumatic.    Eyes: Conjunctivae are normal. Right eye Please view results for these tests on the individual orders.    PTT, ACTIVATED   RAINBOW DRAW BLUE   RAINBOW DRAW LAVENDER   RAINBOW DRAW DARK GREEN   RAINBOW DRAW LIGHT GREEN   RAINBOW DRAW GOLD   RAINBOW DRAW LAVENDER TALL (BNP)     EKG    Rate, inte EKG without ischemia and unchanged from prior. Patient's pain resolved with Maalox, feeling well and reexamination without symptoms at this time. Troponin returned significantly elevated at 0.27.   I discussed with Dr. ALVAREZ [facility] cardiology at this

## 2018-09-17 NOTE — PLAN OF CARE
Problem: Diabetes/Glucose Control  Goal: Glucose maintained within prescribed range  INTERVENTIONS:  - Monitor Blood Glucose as ordered  - Assess for signs and symptoms of hyperglycemia and hypoglycemia  - Administer ordered medications to maintain glucose ordered  - Initiate emergency measures for life threatening arrhythmias  - Monitor electrolytes and administer replacement therapy as ordered  Outcome: Progressing      Comments: S/P angiogram - stent to RCA via R groin.  R groin site soft with no hematoma

## 2018-09-17 NOTE — PROGRESS NOTES
Ibrahima Harper  [id number]  9/17/2018    Post procedure/ recovery hand-off report given to [facility]. Patient's vital signs stable, access site dry and intact, no signs and symptoms of bleeding/ hematoma.     Caroleen Fleischer, RN

## 2018-09-17 NOTE — ED NOTES
Pt is here with left sided CP that started yesterday morning at 9am. Pt said that the pain was going down to his left arm and upper back. Pt was took aspirin and was thinking that it was indigestion. Pt reported SOB with ambulation.  Denied nausea or vomiti

## 2018-09-17 NOTE — PROGRESS NOTES
Pt having chest pain, described like burning or nausea. Pain was rated at 6 out of 10. EKG was done and Dr. Ayleen Nolasco notified. Order received and was done.

## 2018-09-17 NOTE — H&P
[facility] Hospitalist H&P       CC: Patient presents with:  Chest Pain Angina (cardiovascular)       PCP: Cynthia Arteaga NP    History of Present Illness: Patient is a 46year old male with PMH sig for DM, HTN, HLD, CAD s/p MI, s/p CABG in 8/2015, pancreatiti directed Disp:  Rfl: 0   Metoprolol Tartrate (LOPRESSOR) 50 MG Oral Tab Take 50 mg by mouth daily. Disp:  Rfl:    Fenofibrate (TRICOR) 145 MG Oral Tab Take 145 mg by mouth daily with lunch.    Disp:  Rfl: 3   ONETOUCH ULTRA BLUE In Vitro Strip  Disp:  Rfl rate and rhythm, S1, S2 normal, no murmur, rub or gallop appreciated   Abdomen:   Soft, non-tender. Bowel sounds normal. No masses,  No organomegaly. Non distended   Extremities: Extremities normal, atraumatic, no cyanosis or edema.    Skin: Skin color, avery PIV    Dispo: pending clinical course    Outpatient records or previous hospital records reviewed. Further recommendations pending patient's clinical course.   [facility] hospitalist to continue to follow patient while in house    Patient and/or patient's fami

## 2018-09-17 NOTE — CONSULTS
[facility]  Cardiology Consultation    Erroll Senegal Patient Status:  Emergency    1965 MRN [id number]   Location [address] Attending Johnnie Fox MD   [address] Day # 0 PCP Gab Schuster NP     Re reports that he has quit smoking. His smoking use included cigarettes. He has a 6.00 pack-year smoking history. he has never used smokeless tobacco. He reports that he does not drink alcohol or use drugs.     Allergies:  No Known Allergies    Medications: 09/17/2018       Imaging:  ECG - sinus rhythm with RBBB  Cath 8/2015 - LVEF 60-65%, significant 3 vessel CAD    Impression:  Chest pain (NSTEMI)  CAD s/p CABG  HTN  HLP    Recommendations:  - IV heparin for NSTEMI  - consented for [facility] this AM  - continue h

## 2018-09-17 NOTE — CARDIAC REHAB
Cardiac Rehab Phase I    Activity:  Post cath bedrest    Education:  Handouts provided and reviewed: [id number] and CV Procedure Site Care Handout. Diet: Healthy Cardiac diet reviewed. Disease Process: Disease process reviewed.

## 2018-09-17 NOTE — PRE-SEDATION ASSESSMENT
Pre-Procedure Sedation Assessment    Chief Complaint/Indication for procedure: Unstable angina    History of snoring or sleep or apnea?    No    History of previous problems with anesthesia or sedation  No    Physical Findings:  Neck: nl ROM  CV: Normal  PU

## 2018-09-17 NOTE — HISTORICAL OFFICE NOTE
Vini Hudson  : 1965  ACCOUNT:  [id number]  [phone]  PCP:    TODAY'S DATE: 2018  DICTATED BY:  [Dr. Negar Moya      CHIEF COMPLAINT: [Followup of .  CAD, established, Followup of Hypercholesteremia, pure, Followup of Hypertension, F respirations with normal rate and rhythm, clear to auscultation. GI: no masses, tenderness or hepatosplenomegaly, rectal deferred. MS: adequate gait for exercise/testing. EXT: no clubbing or cyanosis. SKIN: no rashes, lesions, ulcers.   NEURO/PSYCH: alert MetFORMIN HCl         1000MG    1 tab twice daily                        11/11/14 NovoLIN 70/30         (70-30)   as directed

## 2018-09-17 NOTE — PROCEDURES
[facility]    PATIENT'S NAME: Wil Miller ISSA   ATTENDING PHYSICIAN: Luis Lund MD   OPERATING PHYSICIAN: Brandon Phipps.  Madonna Jaffe MD   PATIENT ACCOUNT#:   [id number]    LOCATION:  [place] [address]  MEDICAL RECORD #:   [id number] there appears to be at least a 70% to 80% diffuse stenosis throughout the left main mildly sparing the apex. The circumflex artery is nondominant. The first obtuse marginal is very small.   The second obtuse marginal which was a target for the bypass is left ventricle is normal in size with normal contractility. Estimated ejection fraction is 60% to 65%. There is no significant mitral insufficiency.     SUMMARY OF DIAGNOSTIC ANGIOGRAM:    1.   Moderate elevation of left ventricular filling pressures at r

## 2018-09-17 NOTE — ED INITIAL ASSESSMENT (HPI)
Teehidavid is here for eval of left-sided chest pain radiating to left upper back and dyspnea with exertion since yesterday AM.

## 2018-09-17 NOTE — PROCEDURES
Rafiq  Post Procedure Staging Note    Sterling Low Patient Status:  Inpatient    1965 MRN [id number]   Location [facility] Attending Fadia Workman MD   Hosp Day # 0 PCP Burt Wilks

## 2018-09-17 NOTE — PROGRESS NOTES
Chart reviewed. Situation discussed with patient and his wife. He has symptoms suggesting unstable angina with elevated troponins which are increasing, but the absolute value remains low. We have recommended a cardiac cath.   The risks benefits and alt

## 2018-09-18 LAB
ANION GAP SERPL CALC-SCNC: 9 MMOL/L (ref 0–18)
BASOPHILS # BLD: 0.1 K/UL (ref 0–0.2)
BASOPHILS NFR BLD: 1 %
BUN SERPL-MCNC: 11 MG/DL (ref 8–20)
BUN/CREAT SERPL: 12.8 (ref 10–20)
CALCIUM SERPL-MCNC: 9.7 MG/DL (ref 8.5–10.5)
CHLORIDE SERPL-SCNC: 103 MMOL/L (ref 95–110)
CHOLEST SERPL-MCNC: 192 MG/DL (ref 110–200)
CO2 SERPL-SCNC: 26 MMOL/L (ref 22–32)
CREAT SERPL-MCNC: 0.86 MG/DL (ref 0.5–1.5)
EOSINOPHIL # BLD: 0.1 K/UL (ref 0–0.7)
EOSINOPHIL NFR BLD: 1 %
ERYTHROCYTE [DISTWIDTH] IN BLOOD BY AUTOMATED COUNT: 12.2 % (ref 11–15)
GLUCOSE BLDC GLUCOMTR-MCNC: 134 MG/DL (ref 70–99)
GLUCOSE BLDC GLUCOMTR-MCNC: 209 MG/DL (ref 70–99)
GLUCOSE BLDC GLUCOMTR-MCNC: 231 MG/DL (ref 70–99)
GLUCOSE BLDC GLUCOMTR-MCNC: 232 MG/DL (ref 70–99)
GLUCOSE SERPL-MCNC: 170 MG/DL (ref 70–99)
HBA1C MFR BLD: 8.4 % (ref 4–6)
HCT VFR BLD AUTO: 47.3 % (ref 41–52)
HDLC SERPL-MCNC: 31 MG/DL
HGB BLD-MCNC: 16.3 G/DL (ref 13.5–17.5)
LDLC SERPL CALC-MCNC: 88 MG/DL (ref 0–99)
LYMPHOCYTES # BLD: 2.6 K/UL (ref 1–4)
LYMPHOCYTES NFR BLD: 25 %
MAGNESIUM SERPL-MCNC: 1.9 MG/DL (ref 1.8–2.5)
MCH RBC QN AUTO: 31.6 PG (ref 27–32)
MCHC RBC AUTO-ENTMCNC: 34.4 G/DL (ref 32–37)
MCV RBC AUTO: 92 FL (ref 80–100)
MONOCYTES # BLD: 0.8 K/UL (ref 0–1)
MONOCYTES NFR BLD: 8 %
NEUTROPHILS # BLD AUTO: 6.8 K/UL (ref 1.8–7.7)
NEUTROPHILS NFR BLD: 66 %
NONHDLC SERPL-MCNC: 161 MG/DL
OSMOLALITY UR CALC.SUM OF ELEC: 289 MOSM/KG (ref 275–295)
PLATELET # BLD AUTO: 224 K/UL (ref 140–400)
PMV BLD AUTO: 9.8 FL (ref 7.4–10.3)
POTASSIUM SERPL-SCNC: 3.9 MMOL/L (ref 3.3–5.1)
RBC # BLD AUTO: 5.14 M/UL (ref 4.5–5.9)
SODIUM SERPL-SCNC: 138 MMOL/L (ref 136–144)
TRIGL SERPL-MCNC: 365 MG/DL (ref 1–149)
WBC # BLD AUTO: 10.3 K/UL (ref 4–11)

## 2018-09-18 PROCEDURE — 80061 LIPID PANEL: CPT | Performed by: HOSPITALIST

## 2018-09-18 PROCEDURE — 83036 HEMOGLOBIN GLYCOSYLATED A1C: CPT | Performed by: HOSPITALIST

## 2018-09-18 PROCEDURE — 85025 COMPLETE CBC W/AUTO DIFF WBC: CPT | Performed by: HOSPITALIST

## 2018-09-18 PROCEDURE — 82962 GLUCOSE BLOOD TEST: CPT

## 2018-09-18 PROCEDURE — A4216 STERILE WATER/SALINE, 10 ML: HCPCS | Performed by: HOSPITALIST

## 2018-09-18 PROCEDURE — 80048 BASIC METABOLIC PNL TOTAL CA: CPT | Performed by: HOSPITALIST

## 2018-09-18 PROCEDURE — 83735 ASSAY OF MAGNESIUM: CPT | Performed by: HOSPITALIST

## 2018-09-18 RX ORDER — METOPROLOL SUCCINATE 50 MG/1
50 TABLET, EXTENDED RELEASE ORAL
Status: DISCONTINUED | OUTPATIENT
Start: 2018-09-18 | End: 2018-09-19

## 2018-09-18 NOTE — CARDIAC REHAB
Cardiac Rehab Phase I    Activity:  Distance Per self  Assistance needed No  Patient tolerated activity Well per patient. Education:  Handouts provided and reviewed: [address] Diet: Healthy Cardiac diet reviewed.     Disease Pr

## 2018-09-18 NOTE — PLAN OF CARE
Problem: Patient/Family Goals  Goal: Patient/Family Long Term Goal  Patient's Long Term Goal: \"keep my heart healthy with no more chest pain\"    Interventions:  - NSTEMI education in patient's chart, to receive from discharging nurse  - cardiac rehab  -

## 2018-09-18 NOTE — PROGRESS NOTES
[facility] Hospitalist Progress Note     PCP: Lewis Mcallister NP    CC: Follow up       Assessment/Plan:     Patient is a 46year old male with PMH sig for DM, HTN, HLD, CAD s/p MI, s/p CABG in 8/2015, pancreatitis, bells' palsy, active smoker, who presents wit 24 hour   Intake 410 ml   Output —   Net 410 ml       Last 3 Weights  09/18/18 0500 : 174 lb 12.8 oz (79.3 kg)  09/17/18 0904 : 175 lb 4.8 oz (79.5 kg)  09/17/18 0540 : 179 lb 14.3 oz (81.6 kg)  01/09/18 0721 : 180 lb (81.6 kg)  12/22/17 1446 : 183 lb 9.6 hours.    Invalid input(s): ALPHOS, TBIL, DBIL, TPROT    Recent Labs   Lab  09/17/18   1235  09/17/18   1730  09/17/18   2112  09/18/18   0916  09/18/18   1350   PGLU  277*  290*  156*  232*  209*       Recent Labs   Lab  09/17/18   0433  09/17/18   4001

## 2018-09-18 NOTE — PROGRESS NOTES
[facility] AND CLINICS  Progress Note    Alice Delacruz Patient Status:  Inpatient    1965 MRN [id number]   Location [facility] 3W/SW Attending Tyler Horne MD   [facility] Day # 1 PCP Yong Ordonez NP     Assessment:    1.  CAD, pr 09/18/2018    HCT 47.3 09/18/2018     09/18/2018     No results found for: PT, INR  Lab Results   Component Value Date     09/18/2018    K 3.9 09/18/2018     09/18/2018    CO2 26 09/18/2018    BUN 11 09/18/2018    CREATSERUM 0.86 09/18/2

## 2018-09-19 ENCOUNTER — PRIOR ORIGINAL RECORDS (OUTPATIENT)
Dept: OTHER | Age: 53
End: 2018-09-19

## 2018-09-19 VITALS
SYSTOLIC BLOOD PRESSURE: 131 MMHG | DIASTOLIC BLOOD PRESSURE: 91 MMHG | RESPIRATION RATE: 16 BRPM | WEIGHT: 175.19 LBS | HEART RATE: 71 BPM | BODY MASS INDEX: 31.04 KG/M2 | HEIGHT: 63 IN | TEMPERATURE: 98 F | OXYGEN SATURATION: 100 %

## 2018-09-19 LAB
ANION GAP SERPL CALC-SCNC: 7 MMOL/L (ref 0–18)
BUN SERPL-MCNC: 14 MG/DL (ref 8–20)
BUN/CREAT SERPL: 13.2 (ref 10–20)
CALCIUM SERPL-MCNC: 10.2 MG/DL (ref 8.5–10.5)
CHLORIDE SERPL-SCNC: 105 MMOL/L (ref 95–110)
CO2 SERPL-SCNC: 27 MMOL/L (ref 22–32)
CREAT SERPL-MCNC: 1.06 MG/DL (ref 0.5–1.5)
GLUCOSE BLDC GLUCOMTR-MCNC: 166 MG/DL (ref 70–99)
GLUCOSE SERPL-MCNC: 171 MG/DL (ref 70–99)
OSMOLALITY UR CALC.SUM OF ELEC: 293 MOSM/KG (ref 275–295)
POTASSIUM SERPL-SCNC: 4.2 MMOL/L (ref 3.3–5.1)
SODIUM SERPL-SCNC: 139 MMOL/L (ref 136–144)

## 2018-09-19 PROCEDURE — 80048 BASIC METABOLIC PNL TOTAL CA: CPT | Performed by: HOSPITALIST

## 2018-09-19 PROCEDURE — 82962 GLUCOSE BLOOD TEST: CPT

## 2018-09-19 RX ORDER — METOPROLOL SUCCINATE 50 MG/1
50 TABLET, EXTENDED RELEASE ORAL
Qty: 30 TABLET | Refills: 1 | Status: SHIPPED | OUTPATIENT
Start: 2018-09-20 | End: 2022-01-21

## 2018-09-19 RX ORDER — ASPIRIN 81 MG/1
81 TABLET ORAL DAILY
Status: DISCONTINUED | OUTPATIENT
Start: 2018-09-20 | End: 2018-09-19

## 2018-09-19 RX ORDER — CLOPIDOGREL BISULFATE 75 MG/1
75 TABLET ORAL DAILY
Qty: 30 TABLET | Refills: 11 | Status: ON HOLD | OUTPATIENT
Start: 2018-09-19 | End: 2019-11-23

## 2018-09-19 NOTE — DISCHARGE SUMMARY
General Medicine Discharge Summary     Patient ID:  Luigi Dyer  46year old  12/22/1965    Admit date: 9/17/2018    Discharge date and time: 09/19/18    Attending Physician: No att. providers found     Primary Care Physician: Mihaela Melendez disease in the left main and circumflex.   LIMA to LAD patent.  SVG to circumflex occluded  - titrate medication     DM  - hold oral meds  - cont insulin  - SSI and accuchecks  - A1c  8.4  - needs tight control     HTG / HLD  - on 80 of atorvastatin  - TC 2 medications    aspirin 81 MG Tabs     atorvastatin 80 MG Tabs  Commonly known as:  LIPITOR  Take 1 tablet (80 mg total) by mouth nightly.      Fenofibrate 145 MG Tabs  Commonly known as:  TRICOR     lisinopril 10 MG Tabs  Commonly known as:  PRINIVIL,ZERI therapeutic plan as outlined      Nohemi Tran MD  [facility]

## 2018-09-19 NOTE — PROGRESS NOTES
[facility] AND CLINICS  Progress Note    Brodie Flat Lick Patient Status:  Inpatient    1965 MRN [id number]   Location [facility] 3W/SW Attending Deja Hernández MD   [facility] Day # 2 PCP Bar Mems, NP     Assessment:    1.  CAD, pr and oriented x 3,  No apparent distress. HEENT: No focal deficits. Neck: No JVD, carotids 2+ no bruits. Cardiac: Regular rate and rhythm, S1, S2 normal, no murmur  Lungs: Clear without wheezes, rales, rhonchi. Normal excursions and effort. Abdomen:  So

## 2018-09-19 NOTE — TRANSITION NOTE
[facility]    Cardiology Discharge Summary    Ryan Moore Patient Status:  Inpatient    1965 MRN [id number]   Location [facility] 3W/SW Attending No att. providers found   Hosp Day # 2 PCP Mannie Seth, NP VOR:[phone]    Printed on:09/19/18 1420   Medication Information                      aspirin 81 MG Oral Tab  Take 81 mg by mouth daily. atorvastatin 80 MG Oral Tab  Take 1 tablet (80 mg total) by mouth nightly.              Blood Glucose Mon Richa Smith, MICH  9/19/2018      [facility]   ATTENDING PHYSICIAN: Luis Grace MD   OPERATING PHYSICIAN: Alisia Mendez.  Kim Rivers MD   PATIENT ACCOUNT#:   [id number]    LOCATION:  [address] baseline true diameter of the vessel is, but there appears to be at least a 70% to 80% diffuse stenosis throughout the left main mildly sparing the apex.     The circumflex artery is nondominant. The first obtuse marginal is very small.   The second obtuse was performed in the 30-degree العراقي projection. The left ventricle is normal in size with normal contractility. Estimated ejection fraction is 60% to 65%.   There is no significant mitral insufficiency.     SUMMARY OF DIAGNOSTIC ANGIOGRAM:    1.       Mode

## 2018-09-20 ENCOUNTER — TELEPHONE (OUTPATIENT)
Dept: MEDSURG UNIT | Facility: HOSPITAL | Age: 53
End: 2018-09-20

## 2018-09-21 ENCOUNTER — TELEPHONE (OUTPATIENT)
Dept: MEDSURG UNIT | Facility: HOSPITAL | Age: 53
End: 2018-09-21

## 2018-09-26 LAB
BUN: 14 MG/DL
CALCIUM: 10.2 MG/DL
CHLORIDE: 105 MEQ/L
CHOLESTEROL, TOTAL: 192 MG/DL
CREATININE, SERUM: 1.06 MG/DL
GLUCOSE: 171 MG/DL
HDL CHOLESTEROL: 31 MG/DL
HEMATOCRIT: 47.3 %
HEMOGLOBIN: 16.3 G/DL
LDL CHOLESTEROL: 88 MG/DL
NON-HDL CHOLESTEROL: 161 MG/DL
PLATELETS: 224 K/UL
POTASSIUM, SERUM: 4.2 MEQ/L
RED BLOOD COUNT: 5.14 X 10-6/U
SODIUM: 139 MEQ/L
TRIGLYCERIDES: 365 MG/DL
WHITE BLOOD COUNT: 10.3 X 10-3/U

## 2018-09-27 ENCOUNTER — PRIOR ORIGINAL RECORDS (OUTPATIENT)
Dept: OTHER | Age: 53
End: 2018-09-27

## 2018-09-27 ENCOUNTER — MYAURORA ACCOUNT LINK (OUTPATIENT)
Dept: OTHER | Age: 53
End: 2018-09-27

## 2018-10-02 ENCOUNTER — TELEPHONE (OUTPATIENT)
Dept: CARDIOLOGY UNIT | Facility: HOSPITAL | Age: 53
End: 2018-10-02

## 2019-01-01 ENCOUNTER — EXTERNAL RECORD (OUTPATIENT)
Dept: HEALTH INFORMATION MANAGEMENT | Facility: OTHER | Age: 54
End: 2019-01-01

## 2019-02-28 VITALS
SYSTOLIC BLOOD PRESSURE: 140 MMHG | DIASTOLIC BLOOD PRESSURE: 70 MMHG | WEIGHT: 178 LBS | HEART RATE: 67 BPM | HEIGHT: 63 IN | BODY MASS INDEX: 31.54 KG/M2

## 2019-02-28 VITALS
DIASTOLIC BLOOD PRESSURE: 74 MMHG | SYSTOLIC BLOOD PRESSURE: 142 MMHG | HEIGHT: 63 IN | BODY MASS INDEX: 31.89 KG/M2 | WEIGHT: 180 LBS

## 2019-02-28 VITALS
DIASTOLIC BLOOD PRESSURE: 84 MMHG | OXYGEN SATURATION: 98 % | WEIGHT: 182 LBS | HEART RATE: 76 BPM | SYSTOLIC BLOOD PRESSURE: 158 MMHG | HEIGHT: 63 IN | BODY MASS INDEX: 32.25 KG/M2

## 2019-03-21 RX ORDER — HUMAN INSULIN 100 [USP'U]/ML
INJECTION, SUSPENSION SUBCUTANEOUS
COMMUNITY
End: 2021-10-11 | Stop reason: SDUPTHER

## 2019-03-21 RX ORDER — CLOPIDOGREL BISULFATE 75 MG/1
1 TABLET ORAL DAILY
COMMUNITY
Start: 2016-05-31

## 2019-03-21 RX ORDER — LISINOPRIL 20 MG/1
TABLET ORAL
COMMUNITY
End: 2019-04-26

## 2019-03-21 RX ORDER — METOPROLOL SUCCINATE 50 MG/1
TABLET, EXTENDED RELEASE ORAL
COMMUNITY
Start: 2018-11-13

## 2019-03-21 RX ORDER — ATORVASTATIN CALCIUM 20 MG/1
TABLET, FILM COATED ORAL
COMMUNITY
Start: 2018-04-27 | End: 2021-10-11 | Stop reason: SDUPTHER

## 2019-03-21 RX ORDER — FENOFIBRATE 145 MG/1
1 TABLET, COATED ORAL DAILY
COMMUNITY
Start: 2015-07-31 | End: 2021-10-11 | Stop reason: SDUPTHER

## 2019-04-18 ENCOUNTER — LAB ENCOUNTER (OUTPATIENT)
Dept: LAB | Facility: HOSPITAL | Age: 54
End: 2019-04-18
Attending: INTERNAL MEDICINE
Payer: COMMERCIAL

## 2019-04-18 DIAGNOSIS — Z79.899 ENCOUNTER FOR LONG-TERM (CURRENT) USE OF OTHER MEDICATIONS: Primary | ICD-10-CM

## 2019-04-18 DIAGNOSIS — E11.9 DIABETES MELLITUS (HCC): ICD-10-CM

## 2019-04-18 DIAGNOSIS — I25.10 CAD (CORONARY ARTERY DISEASE): ICD-10-CM

## 2019-04-18 DIAGNOSIS — N28.1 ACQUIRED CYST OF KIDNEY: ICD-10-CM

## 2019-04-18 DIAGNOSIS — K86.1 CHRONIC PANCREATITIS (HCC): ICD-10-CM

## 2019-04-18 DIAGNOSIS — I25.10 CORONARY ATHEROSCLEROSIS OF NATIVE CORONARY ARTERY: ICD-10-CM

## 2019-04-18 DIAGNOSIS — E11.9 DIABETES MELLITUS, TYPE II (HCC): ICD-10-CM

## 2019-04-18 DIAGNOSIS — I10 HTN (HYPERTENSION): ICD-10-CM

## 2019-04-18 DIAGNOSIS — E78.5 HYPERLIPIDEMIA: ICD-10-CM

## 2019-04-18 DIAGNOSIS — R80.9 PROTEINURIA: ICD-10-CM

## 2019-04-18 LAB
ABSOLUTE IMMATURE GRANULOCYTES (OFFPRE24): NORMAL
ALBUMIN SERPL-MCNC: 3.9 G/DL
ALBUMIN/GLOB SERPL: NORMAL {RATIO}
ALP SERPL-CCNC: 0.4 U/L
ALT SERPL-CCNC: 25 U/L
ANION GAP SERPL CALC-SCNC: NORMAL MMOL/L
AST SERPL-CCNC: 17 U/L
BASO+EOS+MONOS # BLD: NORMAL 10*3/UL
BASO+EOS+MONOS NFR BLD: NORMAL %
BASOPHILS # BLD: NORMAL 10*3/UL
BASOPHILS NFR BLD: NORMAL %
BILIRUB SERPL-MCNC: 0.4 MG/DL
BUN SERPL-MCNC: 16 MG/DL
BUN/CREAT SERPL: NORMAL
CALCIUM SERPL-MCNC: 9.6 MG/DL
CHLORIDE SERPL-SCNC: 104 MMOL/L
CHOLEST SERPL-MCNC: 212 MG/DL
CHOLEST/HDLC SERPL: NORMAL {RATIO}
CO2 SERPL-SCNC: NORMAL MMOL/L
CREAT SERPL-MCNC: 0.94 MG/DL
CREATININE URINE RANDOM: 216
DIFFERENTIAL METHOD BLD: NORMAL
EOSINOPHIL # BLD: NORMAL 10*3/UL
EOSINOPHIL NFR BLD: NORMAL %
ERYTHROCYTE [DISTWIDTH] IN BLOOD: NORMAL %
GLOBULIN SER-MCNC: 3.9 G/DL
GLUCOSE SERPL-MCNC: 190 MG/DL
HCT VFR BLD CALC: 45 %
HDLC SERPL-MCNC: 28 MG/DL
HGB BLD-MCNC: 15.6 G/DL
IMMATURE GRANULOCYTES (OFFPRE25): NORMAL
LDLC SERPL CALC-MCNC: NORMAL MG/DL
LENGTH OF FAST TIME PATIENT: NORMAL H
LENGTH OF FAST TIME PATIENT: NORMAL H
LYMPHOCYTES # BLD: NORMAL 10*3/UL
LYMPHOCYTES NFR BLD: NORMAL %
MCH RBC QN AUTO: NORMAL PG
MCHC RBC AUTO-ENTMCNC: NORMAL G/DL
MCV RBC AUTO: NORMAL FL
MICROALBUMIN URINE: 7.54
MONOCYTES # BLD: NORMAL 10*3/UL
MONOCYTES NFR BLD: NORMAL %
MPV (OFFPRE2): NORMAL
NEUTROPHILS # BLD: NORMAL 10*3/UL
NEUTROPHILS NFR BLD: NORMAL %
NONHDLC SERPL-MCNC: 184 MG/DL
NRBC BLD MANUAL-RTO: NORMAL %
PLAT MORPH BLD: NORMAL
PLATELET # BLD: 220 10*3/UL
POTASSIUM SERPL-SCNC: 4.1 MMOL/L
PROT ?TM UR-MCNC: NORMAL MG/DL
PROT SERPL-MCNC: 7.8 G/DL
PROT UR-MCNC: 22.6 G/DL
RBC # BLD: 4.92 10*6/UL
RBC MORPH BLD: NORMAL
SODIUM SERPL-SCNC: 139 MMOL/L
TRIGL SERPL-MCNC: 585 MG/DL
TSH SERPL-ACNC: 2.48 M[IU]/L
VLDLC SERPL CALC-MCNC: NORMAL MG/DL
WBC # BLD: 5.8 10*3/UL
WBC MORPH BLD: NORMAL

## 2019-04-18 PROCEDURE — 80061 LIPID PANEL: CPT

## 2019-04-18 PROCEDURE — 84156 ASSAY OF PROTEIN URINE: CPT

## 2019-04-18 PROCEDURE — 82570 ASSAY OF URINE CREATININE: CPT

## 2019-04-18 PROCEDURE — 83721 ASSAY OF BLOOD LIPOPROTEIN: CPT

## 2019-04-18 PROCEDURE — 84443 ASSAY THYROID STIM HORMONE: CPT

## 2019-04-18 PROCEDURE — 36415 COLL VENOUS BLD VENIPUNCTURE: CPT

## 2019-04-18 PROCEDURE — 83695 ASSAY OF LIPOPROTEIN(A): CPT

## 2019-04-18 PROCEDURE — 82043 UR ALBUMIN QUANTITATIVE: CPT

## 2019-04-18 PROCEDURE — 85025 COMPLETE CBC W/AUTO DIFF WBC: CPT

## 2019-04-18 PROCEDURE — 80053 COMPREHEN METABOLIC PANEL: CPT

## 2019-04-18 PROCEDURE — 81001 URINALYSIS AUTO W/SCOPE: CPT

## 2019-04-19 ENCOUNTER — CLINICAL ABSTRACT (OUTPATIENT)
Dept: CARDIOLOGY | Age: 54
End: 2019-04-19

## 2019-04-26 ENCOUNTER — OFFICE VISIT (OUTPATIENT)
Dept: CARDIOLOGY | Age: 54
End: 2019-04-26

## 2019-04-26 VITALS
BODY MASS INDEX: 32.07 KG/M2 | OXYGEN SATURATION: 98 % | WEIGHT: 181 LBS | HEART RATE: 72 BPM | DIASTOLIC BLOOD PRESSURE: 70 MMHG | SYSTOLIC BLOOD PRESSURE: 160 MMHG | HEIGHT: 63 IN

## 2019-04-26 DIAGNOSIS — I25.10 CORONARY ARTERY DISEASE INVOLVING NATIVE CORONARY ARTERY OF NATIVE HEART WITHOUT ANGINA PECTORIS: ICD-10-CM

## 2019-04-26 DIAGNOSIS — Z95.1 HX OF CABG: ICD-10-CM

## 2019-04-26 DIAGNOSIS — I10 ESSENTIAL HYPERTENSION: Primary | ICD-10-CM

## 2019-04-26 DIAGNOSIS — E78.01 FAMILIAL HYPERCHOLESTEROLEMIA: ICD-10-CM

## 2019-04-26 PROCEDURE — 3077F SYST BP >= 140 MM HG: CPT | Performed by: INTERNAL MEDICINE

## 2019-04-26 PROCEDURE — 99214 OFFICE O/P EST MOD 30 MIN: CPT | Performed by: INTERNAL MEDICINE

## 2019-04-26 PROCEDURE — 3078F DIAST BP <80 MM HG: CPT | Performed by: INTERNAL MEDICINE

## 2019-04-26 RX ORDER — BLOOD-GLUCOSE METER
EACH MISCELLANEOUS
COMMUNITY
Start: 2015-08-21 | End: 2021-10-11 | Stop reason: SDUPTHER

## 2019-04-26 SDOH — HEALTH STABILITY: PHYSICAL HEALTH: ON AVERAGE, HOW MANY MINUTES DO YOU ENGAGE IN EXERCISE AT THIS LEVEL?: 20 MIN

## 2019-04-26 SDOH — HEALTH STABILITY: PHYSICAL HEALTH: ON AVERAGE, HOW MANY DAYS PER WEEK DO YOU ENGAGE IN MODERATE TO STRENUOUS EXERCISE (LIKE A BRISK WALK)?: 7 DAYS

## 2019-04-26 SDOH — HEALTH STABILITY: MENTAL HEALTH: HOW OFTEN DO YOU HAVE A DRINK CONTAINING ALCOHOL?: NEVER

## 2019-04-26 SDOH — HEALTH STABILITY: MENTAL HEALTH
STRESS IS WHEN SOMEONE FEELS TENSE, NERVOUS, ANXIOUS, OR CAN'T SLEEP AT NIGHT BECAUSE THEIR MIND IS TROUBLED. HOW STRESSED ARE YOU?: NOT AT ALL

## 2019-11-05 ENCOUNTER — CLINICAL ABSTRACT (OUTPATIENT)
Dept: CARDIOLOGY | Age: 54
End: 2019-11-05

## 2019-11-21 ENCOUNTER — APPOINTMENT (OUTPATIENT)
Dept: GENERAL RADIOLOGY | Facility: HOSPITAL | Age: 54
DRG: 247 | End: 2019-11-21
Attending: EMERGENCY MEDICINE
Payer: COMMERCIAL

## 2019-11-21 ENCOUNTER — APPOINTMENT (OUTPATIENT)
Dept: CV DIAGNOSTICS | Facility: HOSPITAL | Age: 54
DRG: 247 | End: 2019-11-21
Attending: INTERNAL MEDICINE
Payer: COMMERCIAL

## 2019-11-21 ENCOUNTER — HOSPITAL ENCOUNTER (INPATIENT)
Facility: HOSPITAL | Age: 54
LOS: 2 days | Discharge: HOME | DRG: 247 | End: 2019-11-23
Attending: EMERGENCY MEDICINE | Admitting: HOSPITALIST
Payer: COMMERCIAL

## 2019-11-21 ENCOUNTER — APPOINTMENT (OUTPATIENT)
Dept: INTERVENTIONAL RADIOLOGY/VASCULAR | Facility: HOSPITAL | Age: 54
DRG: 247 | End: 2019-11-21
Attending: INTERNAL MEDICINE
Payer: COMMERCIAL

## 2019-11-21 DIAGNOSIS — I24.9 ACUTE CORONARY SYNDROME (HCC): Primary | ICD-10-CM

## 2019-11-21 PROBLEM — I21.19 ACUTE MI, INFERIOR WALL (HCC): Status: ACTIVE | Noted: 2019-11-21

## 2019-11-21 PROBLEM — R73.9 HYPERGLYCEMIA: Status: ACTIVE | Noted: 2019-11-21

## 2019-11-21 PROCEDURE — 92941 PRQ TRLML REVSC TOT OCCL AMI: CPT | Performed by: INTERNAL MEDICINE

## 2019-11-21 PROCEDURE — 4A023N7 MEASUREMENT OF CARDIAC SAMPLING AND PRESSURE, LEFT HEART, PERCUTANEOUS APPROACH: ICD-10-PCS | Performed by: INTERNAL MEDICINE

## 2019-11-21 PROCEDURE — B2101ZZ FLUOROSCOPY OF SINGLE CORONARY ARTERY USING LOW OSMOLAR CONTRAST: ICD-10-PCS | Performed by: INTERNAL MEDICINE

## 2019-11-21 PROCEDURE — 93458 L HRT ARTERY/VENTRICLE ANGIO: CPT | Performed by: INTERNAL MEDICINE

## 2019-11-21 PROCEDURE — 02C03ZZ EXTIRPATION OF MATTER FROM CORONARY ARTERY, ONE ARTERY, PERCUTANEOUS APPROACH: ICD-10-PCS | Performed by: INTERNAL MEDICINE

## 2019-11-21 PROCEDURE — 027036Z DILATION OF CORONARY ARTERY, ONE ARTERY WITH THREE DRUG-ELUTING INTRALUMINAL DEVICES, PERCUTANEOUS APPROACH: ICD-10-PCS | Performed by: INTERNAL MEDICINE

## 2019-11-21 PROCEDURE — 99152 MOD SED SAME PHYS/QHP 5/>YRS: CPT | Performed by: INTERNAL MEDICINE

## 2019-11-21 PROCEDURE — 99223 1ST HOSP IP/OBS HIGH 75: CPT | Performed by: INTERNAL MEDICINE

## 2019-11-21 PROCEDURE — B2181ZZ FLUOROSCOPY OF LEFT INTERNAL MAMMARY BYPASS GRAFT USING LOW OSMOLAR CONTRAST: ICD-10-PCS | Performed by: INTERNAL MEDICINE

## 2019-11-21 PROCEDURE — 93306 TTE W/DOPPLER COMPLETE: CPT | Performed by: INTERNAL MEDICINE

## 2019-11-21 PROCEDURE — 5A1223Z PERFORMANCE OF CARDIAC PACING, CONTINUOUS: ICD-10-PCS | Performed by: INTERNAL MEDICINE

## 2019-11-21 PROCEDURE — 99223 1ST HOSP IP/OBS HIGH 75: CPT | Performed by: HOSPITALIST

## 2019-11-21 PROCEDURE — 71046 X-RAY EXAM CHEST 2 VIEWS: CPT | Performed by: EMERGENCY MEDICINE

## 2019-11-21 PROCEDURE — B2121ZZ FLUOROSCOPY OF SINGLE CORONARY ARTERY BYPASS GRAFT USING LOW OSMOLAR CONTRAST: ICD-10-PCS | Performed by: INTERNAL MEDICINE

## 2019-11-21 RX ORDER — HEPARIN SODIUM 1000 [USP'U]/ML
5000 INJECTION, SOLUTION INTRAVENOUS; SUBCUTANEOUS ONCE
Status: COMPLETED | OUTPATIENT
Start: 2019-11-21 | End: 2019-11-21

## 2019-11-21 RX ORDER — METOPROLOL TARTRATE 5 MG/5ML
5 INJECTION INTRAVENOUS ONCE
Status: COMPLETED | OUTPATIENT
Start: 2019-11-21 | End: 2019-11-21

## 2019-11-21 RX ORDER — LISINOPRIL 10 MG/1
10 TABLET ORAL
Status: DISCONTINUED | OUTPATIENT
Start: 2019-11-21 | End: 2019-11-23

## 2019-11-21 RX ORDER — ADENOSINE 3 MG/ML
INJECTION, SOLUTION INTRAVENOUS
Status: COMPLETED
Start: 2019-11-21 | End: 2019-11-21

## 2019-11-21 RX ORDER — NITROGLYCERIN 20 MG/100ML
INJECTION INTRAVENOUS
Status: DISCONTINUED | OUTPATIENT
Start: 2019-11-21 | End: 2019-11-21

## 2019-11-21 RX ORDER — ASPIRIN 81 MG/1
324 TABLET, CHEWABLE ORAL ONCE
Status: COMPLETED | OUTPATIENT
Start: 2019-11-21 | End: 2019-11-21

## 2019-11-21 RX ORDER — HEPARIN SODIUM 1000 [USP'U]/ML
60 INJECTION, SOLUTION INTRAVENOUS; SUBCUTANEOUS ONCE
Status: DISCONTINUED | OUTPATIENT
Start: 2019-11-21 | End: 2019-11-21

## 2019-11-21 RX ORDER — NITROGLYCERIN 20 MG/100ML
INJECTION INTRAVENOUS
Status: COMPLETED
Start: 2019-11-21 | End: 2019-11-21

## 2019-11-21 RX ORDER — METOPROLOL SUCCINATE 50 MG/1
50 TABLET, EXTENDED RELEASE ORAL DAILY
Status: DISCONTINUED | OUTPATIENT
Start: 2019-11-22 | End: 2019-11-23

## 2019-11-21 RX ORDER — DOPAMINE HYDROCHLORIDE 320 MG/100ML
INJECTION, SOLUTION INTRAVENOUS
Status: COMPLETED
Start: 2019-11-21 | End: 2019-11-21

## 2019-11-21 RX ORDER — ATORVASTATIN CALCIUM 40 MG/1
80 TABLET, FILM COATED ORAL NIGHTLY
Status: DISCONTINUED | OUTPATIENT
Start: 2019-11-21 | End: 2019-11-23

## 2019-11-21 RX ORDER — LIDOCAINE HYDROCHLORIDE 20 MG/ML
INJECTION, SOLUTION EPIDURAL; INFILTRATION; INTRACAUDAL; PERINEURAL
Status: COMPLETED
Start: 2019-11-21 | End: 2019-11-21

## 2019-11-21 RX ORDER — HEPARIN SODIUM AND DEXTROSE 10000; 5 [USP'U]/100ML; G/100ML
INJECTION INTRAVENOUS CONTINUOUS
Status: DISCONTINUED | OUTPATIENT
Start: 2019-11-21 | End: 2019-11-21

## 2019-11-21 RX ORDER — SODIUM CHLORIDE 9 MG/ML
INJECTION, SOLUTION INTRAVENOUS CONTINUOUS
Status: ACTIVE | OUTPATIENT
Start: 2019-11-21 | End: 2019-11-21

## 2019-11-21 RX ORDER — NITROGLYCERIN 0.4 MG/1
0.4 TABLET SUBLINGUAL ONCE
Status: COMPLETED | OUTPATIENT
Start: 2019-11-21 | End: 2019-11-21

## 2019-11-21 RX ORDER — ASPIRIN 81 MG/1
81 TABLET ORAL DAILY
Status: DISCONTINUED | OUTPATIENT
Start: 2019-11-22 | End: 2019-11-23

## 2019-11-21 RX ORDER — FENOFIBRATE 134 MG/1
134 CAPSULE ORAL
Status: DISCONTINUED | OUTPATIENT
Start: 2019-11-21 | End: 2019-11-23

## 2019-11-21 RX ORDER — HEPARIN SODIUM AND DEXTROSE 10000; 5 [USP'U]/100ML; G/100ML
12 INJECTION INTRAVENOUS ONCE
Status: DISCONTINUED | OUTPATIENT
Start: 2019-11-21 | End: 2019-11-21

## 2019-11-21 RX ORDER — DEXTROSE MONOHYDRATE 25 G/50ML
50 INJECTION, SOLUTION INTRAVENOUS AS NEEDED
Status: DISCONTINUED | OUTPATIENT
Start: 2019-11-21 | End: 2019-11-23

## 2019-11-21 RX ORDER — MIDAZOLAM HYDROCHLORIDE 1 MG/ML
INJECTION INTRAMUSCULAR; INTRAVENOUS
Status: COMPLETED
Start: 2019-11-21 | End: 2019-11-21

## 2019-11-21 RX ORDER — HEPARIN SODIUM AND DEXTROSE 10000; 5 [USP'U]/100ML; G/100ML
12 INJECTION INTRAVENOUS ONCE
Status: COMPLETED | OUTPATIENT
Start: 2019-11-21 | End: 2019-11-21

## 2019-11-21 RX ORDER — NICOTINE 21 MG/24HR
1 PATCH, TRANSDERMAL 24 HOURS TRANSDERMAL DAILY
Status: DISCONTINUED | OUTPATIENT
Start: 2019-11-21 | End: 2019-11-23

## 2019-11-21 NOTE — H&P
[address] [facility] Patient Status:  Inpatient    1965 MRN [id number]   Location [facility] 2W/SW Attending Mesfin Gardiner MD   Hosp Day # 0 PCP Marily Huang, APRN     Date: death)   • Diabetes Mother    • Heart Disease Sister         CVA   • Hypertension Sister    • Lipids Sister    • Heart Disease Brother    • Diabetes Brother    • Hypertension Brother    • Lipids Brother      Social History:  Social History    Tobacco Use distress. Lying in bed, comfortably. SKIN:  Warm and hydrated  PSYCHIATRIC: Calm and cooperative but sleepy  HEENT:  Head was atraumatic and normocephalic. Eyes:  Extraocular muscles were intact. Sclera was anicteric.   Pupils were equally reactive to l Interpretation  --------------------------       Assessment/Plan:     Acute MI, inferior wall ([address]  H/O CAD s/p CABG 2015  -taken to cath lab and had complex RCA stent placement / thrombectomy 11/21  -stop plavix  -start Brilenta   -asa  -BB  -statin  -he

## 2019-11-21 NOTE — PROGRESS NOTES
[place] [facility]    Brief Cardiac Cath Note  Tristen Boast Patient Status:  Inpatient    1965 MRN [id number]   Location [facility] 2W/SW Attending Sarah Rodrigues MD;Irvin*   Hosp Day # 0 PCP DINA Park

## 2019-11-21 NOTE — CONSULTS
[facility]    Cardiology Consultation  [facility]    Ryan Moore Patient Status:  Inpatient    1965 MRN [id number]   Location [facility] 2W/SW Attending Petra Song MD   [facility] Day Pancreatitis        Past Surgical History  Past Surgical History:   Procedure Laterality Date   • CABG     • EGD      [facility]  x2   • EGD      AT [address] [place] [address]   • ESOPHAGOGASTRODUODENOSCOPY (EGD) N/A 1/9/2018    Performed by LUBA Cuba 1000 MG Oral Tab, Take 1,000 mg by mouth daily with breakfast.    Blood Glucose Monitoring Suppl (ONETOUCH ULTRA 2) W/DEVICE Does not apply Kit, Use as directed  Fenofibrate (TRICOR) 145 MG Oral Tab, Take 145 mg by mouth daily with lunch.     [place] Mater distress. HEENT: Normocephalic, anicteric sclera, neck supple, no thyromegaly or adenopathy. Neck: No JVD, carotids 2+, no bruits. Cardiac: Regular rate and rhythm. S1, S2 normal. No murmur, pericardial rub, S3, or extra cardiac sounds.   Lungs: Clear wi EKG reveals inferior wall MI with reciprocal ST depression  Impression:     Acute MI, inferior wall [facility])  Patient will be taken emergently to the Cath Lab for cardiac cath intervention as needed.   Discussed indications risks and benefits with patien

## 2019-11-21 NOTE — PROGRESS NOTES
Therapeutic Interchange from:   Insulin NPH & Regular 70/30 (NovoLIN 70/30) 100 UNIT/ML injection 30 Units    To:      Insulin Aspart Pen (NOVOLOG) 100 UNIT/ML flexpen 11 Units subcutaneously TID-CC    Insulin Aspart Pen (NOVOLOG) 100 UNIT/ML flexpen 1-7 Un

## 2019-11-21 NOTE — ED PROVIDER NOTES
Patient Seen in: [facility] AND [facility] Emergency Department      History   Patient presents with:  Chest Pain Angina (cardiovascular)    Stated Complaint: chest and back pain for a week    HPI    The patient is a 12-year-old male with a history of coronary a week  Other systems are as noted in HPI. Constitutional and vital signs reviewed. All other systems reviewed and negative except as noted above.     Physical Exam     ED Triage Vitals   BP 11/21/19 0751 (!) 172/96   Pulse 11/21/19 0751 72   Resp 11/21 is not limited to acute coronary syndrome, GERD, dissection          ED Course     Labs Reviewed   BASIC METABOLIC PANEL (8) - Abnormal; Notable for the following components:       Result Value    Glucose 225 (*)     Calculated Osmolality 296 (*)     All o Please view results for these tests on the individual orders. RAINBOW DRAW BLUE   RAINBOW DRAW LAVENDER   RAINBOW DRAW LIGHT GREEN   RAINBOW DRAW GOLD   CBC W/ DIFFERENTIAL     EKG    Rate, intervals and axes as noted on EKG Report.   Rate: 75  Rhy Disposition and Plan     Clinical Impression:  Acute coronary syndrome ([facility]  (primary encounter diagnosis)    Disposition:  Admit  11/21/2019  9:20 am    Follow-up:  No follow-up provider specified.       Medications Prescribed:  Current Disc

## 2019-11-22 ENCOUNTER — APPOINTMENT (OUTPATIENT)
Dept: GENERAL RADIOLOGY | Facility: HOSPITAL | Age: 54
DRG: 247 | End: 2019-11-22
Attending: HOSPITALIST
Payer: COMMERCIAL

## 2019-11-22 LAB
CHOLEST SERPL-MCNC: 189 MG/DL
CHOLEST/HDLC SERPL: NORMAL {RATIO}
HDLC SERPL-MCNC: 30 MG/DL
LDLC SERPL CALC-MCNC: 82 MG/DL
LENGTH OF FAST TIME PATIENT: NORMAL H
NONHDLC SERPL-MCNC: 159 MG/DL
TRIGL SERPL-MCNC: 385 MG/DL
VLDLC SERPL CALC-MCNC: 77 MG/DL

## 2019-11-22 PROCEDURE — 99232 SBSQ HOSP IP/OBS MODERATE 35: CPT | Performed by: INTERNAL MEDICINE

## 2019-11-22 PROCEDURE — 71045 X-RAY EXAM CHEST 1 VIEW: CPT | Performed by: HOSPITALIST

## 2019-11-22 PROCEDURE — 99233 SBSQ HOSP IP/OBS HIGH 50: CPT | Performed by: HOSPITALIST

## 2019-11-22 RX ORDER — SIMETHICONE 80 MG
80 TABLET,CHEWABLE ORAL
Status: DISCONTINUED | OUTPATIENT
Start: 2019-11-22 | End: 2019-11-23

## 2019-11-22 RX ORDER — ACETAMINOPHEN 500 MG
500 TABLET ORAL EVERY 6 HOURS PRN
Status: DISCONTINUED | OUTPATIENT
Start: 2019-11-22 | End: 2019-11-23

## 2019-11-22 RX ORDER — POTASSIUM CHLORIDE 20 MEQ/1
40 TABLET, EXTENDED RELEASE ORAL ONCE
Status: COMPLETED | OUTPATIENT
Start: 2019-11-22 | End: 2019-11-22

## 2019-11-22 RX ORDER — HEPARIN SODIUM 5000 [USP'U]/ML
5000 INJECTION, SOLUTION INTRAVENOUS; SUBCUTANEOUS EVERY 12 HOURS SCHEDULED
Status: DISCONTINUED | OUTPATIENT
Start: 2019-11-22 | End: 2019-11-23

## 2019-11-22 NOTE — CARDIAC REHAB
Cardiac Rehab Phase I    Activity:  Distance 300 feet   Assistance needed No  Patient tolerated activity Well. VSS. Education:  Handouts provided and reviewed: [address] Diet: Healthy Cardiac diet reviewed.     Disease Process:

## 2019-11-22 NOTE — DIABETES ED
[facility]    Diabetes Education  Note    Bob Duran Patient Status:  Inpatient   1965 MRN [id number]  Location [facility] 2W/SW Attending Jerica Mcknight MD  Hosp Day # 1 PCP DINA Fay

## 2019-11-22 NOTE — PROGRESS NOTES
Transferred pt to telemetry room 326 by w/c, pt's wife had left but he texted her w/new room#. Report called earlier to TAYLOR Montesinos, no change status since.  Skin check verified by Richelle Douglas RN, pt has bilat drsgs over 11/21 cardiac cath puncture sites, very

## 2019-11-22 NOTE — PROGRESS NOTES
[facility]    Progress Note    Ayde Handley Patient Status:  Inpatient    1965 MRN [id number]   Location [facility] 2W/SW Attending Gricelda Turner MD   Hosp Day # 1 PCP DINA Hahn       Subjective detemir  34 Units Subcutaneous Nightly   • Insulin Aspart Pen  11 Units Subcutaneous TID CC   • Insulin Aspart Pen  1-7 Units Subcutaneous TID CC       Current PRN Inpatient Meds:      acetaminophen, dextrose, Glucose-Vitamin C, glucose    Results:     Rec Interpretation  -------------------------- Sinus Rhythm -Left atrial enlargement.  inferoposterior ST elevation MI, posterior changes are new ABNORMAL When compared with ECG of 09/17/2018 11:28:17 New posterior ischemic changes Electronically signed on 11/2

## 2019-11-22 NOTE — DIETARY NOTE
DIABETES NUTRITION ASSESSMENT:    Hx of DM several years with previous ed. A1C generally = 8.5%. Monitors glu ~ 3 x/wk. Patient educated regarding diabetes diet basics in past. Discussed carbohydrate foods, portion sizes, and food label reading.   Handout gi

## 2019-11-22 NOTE — PROCEDURES
[facility]    PATIENT'S NAME: Juan Russell ISSA   ATTENDING PHYSICIAN: Sinai Ybarra MD   OPERATING PHYSICIAN: Meagan Bean.  Scar Barahona MD   PATIENT ACCOUNT#:   [id number]    LOCATION:  [address] [place] [address] [place] #:   [id number]        procedure, the vessel was wide open, the ALEX grade flow in the distal vessel was normal ALEX grade 3 other than in the posterior descending. We then did internal mammary angiography. The internal mammary and the LAD were widely patent.   The vein to the

## 2019-11-22 NOTE — PROGRESS NOTES
Shift summary: Received pt from cath lab on bed, very sleepy but responsive, R groin site w/pressure dressing, L venous sheath intact, to IVF by gravity, placed on infusion pump. L groin sheath removed per order, hemostasis after 10 min, dressing applied.

## 2019-11-22 NOTE — DIETARY NOTE
NUTRITION EDUCATION NOTE    Received consult for nutrition education per cardiac rehab order set. Appropriate education and handout(s) provided. See education section of Epic for specifics.     Chapo Epperson RD,LDN  [phone]

## 2019-11-22 NOTE — PLAN OF CARE
Problem: Patient/Family Goals  Goal: Patient/Family Short Term Goal  Description  Patient's Short Term Goal: To be able to go home    Interventions:   - Monitor for tolerance of activity progression, reinforce education re:risk factor modification.   - S ordered  Outcome: Progressing  SR, rare PVC     Problem: Diabetes/Glucose Control  Goal: Glucose maintained within prescribed range  Description  INTERVENTIONS:  - Monitor Blood Glucose as ordered  - Assess for signs and symptoms of hyperglycemia and hypog

## 2019-11-22 NOTE — PROGRESS NOTES
[facility]  Cardiology Progress Note    Anthonyricha Mejianeela Patient Status:  Inpatient    1965 MRN [id number]   Location [facility] 3W/SW Attending David Padgett MD   Hosp Day # 1 PCP DINA Maddox       Subjective Lab 11/21/19  0758   ALT 26   AST 21   ALB 3.8       Recent Labs   Lab 11/21/19 0758 11/21/19  0951   TROP 0.554* 0.542*       Allergies:   No Known Allergies    Medications:  acetaminophen (TYLENOL EXTRA STRENGTH) tab 500 mg, 500 mg, Oral, Q6H PRN  Hep

## 2019-11-23 VITALS
TEMPERATURE: 99 F | HEART RATE: 76 BPM | BODY MASS INDEX: 31.52 KG/M2 | HEIGHT: 63 IN | RESPIRATION RATE: 16 BRPM | SYSTOLIC BLOOD PRESSURE: 120 MMHG | WEIGHT: 177.88 LBS | OXYGEN SATURATION: 100 % | DIASTOLIC BLOOD PRESSURE: 91 MMHG

## 2019-11-23 LAB
ABSOLUTE IMMATURE GRANULOCYTES (OFFPRE24): NORMAL
ANION GAP SERPL CALC-SCNC: 4 MMOL/L
BASO+EOS+MONOS # BLD: NORMAL 10*3/UL
BASO+EOS+MONOS NFR BLD: NORMAL %
BASOPHILS # BLD: NORMAL 10*3/UL
BASOPHILS NFR BLD: NORMAL %
BUN SERPL-MCNC: 18 MG/DL
BUN/CREAT SERPL: 17.1
CALCIUM SERPL-MCNC: 9 MG/DL
CHLORIDE SERPL-SCNC: 110 MMOL/L
CO2 SERPL-SCNC: 29 MMOL/L
CREAT SERPL-MCNC: 1.05 MG/DL
DIFFERENTIAL METHOD BLD: NORMAL
EOSINOPHIL # BLD: NORMAL 10*3/UL
EOSINOPHIL NFR BLD: NORMAL %
ERYTHROCYTE [DISTWIDTH] IN BLOOD: NORMAL %
GLUCOSE SERPL-MCNC: 136 MG/DL
HCT VFR BLD CALC: 38.5 %
HGB BLD-MCNC: 13.2 G/DL
IMMATURE GRANULOCYTES (OFFPRE25): NORMAL
LENGTH OF FAST TIME PATIENT: NORMAL H
LYMPHOCYTES # BLD: NORMAL 10*3/UL
LYMPHOCYTES NFR BLD: NORMAL %
MAGNESIUM SERPL-MCNC: 2.1 MG/DL
MCH RBC QN AUTO: NORMAL PG
MCHC RBC AUTO-ENTMCNC: NORMAL G/DL
MCV RBC AUTO: NORMAL FL
MONOCYTES # BLD: NORMAL 10*3/UL
MONOCYTES NFR BLD: NORMAL %
MPV (OFFPRE2): NORMAL
NEUTROPHILS # BLD: NORMAL 10*3/UL
NEUTROPHILS NFR BLD: NORMAL %
NRBC BLD MANUAL-RTO: NORMAL %
PLAT MORPH BLD: NORMAL
PLATELET # BLD: NORMAL 10*3/UL
POTASSIUM SERPL-SCNC: 4 MMOL/L
RBC # BLD: 4.27 10*6/UL
RBC MORPH BLD: NORMAL
SODIUM SERPL-SCNC: 143 MMOL/L
WBC # BLD: 8.1 10*3/UL
WBC MORPH BLD: NORMAL

## 2019-11-23 PROCEDURE — 99232 SBSQ HOSP IP/OBS MODERATE 35: CPT | Performed by: INTERNAL MEDICINE

## 2019-11-23 PROCEDURE — 99239 HOSP IP/OBS DSCHRG MGMT >30: CPT | Performed by: HOSPITALIST

## 2019-11-23 RX ORDER — CLOPIDOGREL BISULFATE 75 MG/1
75 TABLET ORAL DAILY
Qty: 30 TABLET | Refills: 0 | Status: ON HOLD | OUTPATIENT
Start: 2019-11-23 | End: 2020-11-11

## 2019-11-23 RX ORDER — NICOTINE 21 MG/24HR
1 PATCH, TRANSDERMAL 24 HOURS TRANSDERMAL DAILY
Qty: 30 PATCH | Refills: 0 | Status: ON HOLD | OUTPATIENT
Start: 2019-11-24 | End: 2020-11-11

## 2019-11-23 RX ORDER — CLOPIDOGREL BISULFATE 75 MG/1
75 TABLET ORAL DAILY
Status: DISCONTINUED | OUTPATIENT
Start: 2019-11-23 | End: 2019-11-23

## 2019-11-23 RX ORDER — FENOFIBRATE 145 MG/1
145 TABLET, COATED ORAL
Qty: 30 TABLET | Refills: 3 | Status: ON HOLD | OUTPATIENT
Start: 2019-11-23 | End: 2022-01-21

## 2019-11-23 NOTE — CM/SW NOTE
Received MDO for Brilinta coverage. Patient has [employer] for insurance, which has minimal Rx coverage. Spoke with patient's pharmacy team at [address] [place] [address] ([phone]) who states patient's insurance is requesting a prior authorization.  Discussed Prescription

## 2019-11-23 NOTE — PLAN OF CARE
Coupon for john not providing 30 day supply. See social work note. Unable to call for pre auth since closed on weekend. Dr Kearney updated. Dc on plavix. Will set up for outpatient appt with [facility]. See if they can arrange for outpatient access.

## 2019-11-23 NOTE — PLAN OF CARE
Patient is alert and oriented x 4. VSS. RA. Ambulates independently. Denies chest pain or shortness of breath. Patient seen ambulating in halls and room. Will continue to monitor.      Problem: Diabetes/Glucose Control  Goal: Glucose maintained within presc trends  - Monitor for bleeding, hypotension and signs of decreased cardiac output  - Evaluate effectiveness of vasoactive medications to optimize hemodynamic stability  - Monitor arterial and/or venous puncture sites for bleeding and/or hematoma  - Assess

## 2019-11-23 NOTE — TRANSITION NOTE
[facility]    Cardiology Discharge Summary    [facility]:  [facility]    1965 MRN [id number]   Location [facility] 3W/SW Attending Roosvelt Habermann, MD   Hosp Day # 2 PCP Crystal Sanders, APRN 29.0      Recent Labs   Lab 11/21/19  0758 11/22/19  0421 11/23/19  0542   RBC 5.28 4.47 4.27*   HGB 16.6 14.0 13.2   HCT 47.0 39.8 38.5*   MCV 89.0 89.0 90.2   MCH 31.4 31.3 30.9   MCHC 35.3 35.2 34.3   RDW 12.0 12.2 12.3   NEPRELIM 3.31  --  4.44   WBC 6 Bisulfate 75 MG Tabs  Commonly known as:  PLAVIX              Where to Get Your Medications      These medications were sent to [facility] [address] AT [address] [place] [address], [phone], [address] [place] [address]

## 2019-11-23 NOTE — DISCHARGE SUMMARY
[facility]    Discharge Summary    Camden Asher Patient Status:  Inpatient    1965 MRN [id number]   Location [facility] 3W/SW Attending Stephanie Taveras MD   Hosp Day # 2 PCP Angel Caldwell, APRN     Date o PCP/APN     VTE P: SCDs       CULTURE:   No results found for this visit on 11/21/19. IMAGING STUDIES: SOME MAY NEED FOLLOW UP WITH PCP   Xr Chest Pa + Lat Chest (cpt=71046)    Result Date: 11/21/2019  CONCLUSION:  1. No acute airspace disease.   Minimal Recent Labs   Lab 11/21/19  0758 11/22/19  0421 11/23/19  0542   RBC 5.28 4.47 4.27*   HGB 16.6 14.0 13.2   HCT 47.0 39.8 38.5*   MCV 89.0 89.0 90.2   MCH 31.4 31.3 30.9   MCHC 35.3 35.2 34.3   RDW 12.0 12.2 12.3   NEPRELIM 3.31  --  4.44   WBC 6.7 1 Tabs  Commonly known as:  GLUCOPHAGE      Take 1,000 mg by mouth daily with breakfast.   Refills:  2     NOVOLIN 70/30 (70-30) 100 UNIT/ML Susp  Generic drug:  Insulin NPH & Regular 70/30      Inject 30 Units into the skin 2 (two) times daily.    Refills:

## 2019-11-23 NOTE — PROGRESS NOTES
[facility]OS[facility] - [facility]    Cardiology Progress Note    Ibrahima Harper Patient Status:  Inpatient    1965 MRN [id number]   Location [facility] 3W/SW Attending Paulette Edwards MD   Hosp Day # 2 PCP Cyrus Miller, APRN Succinate ER  50 mg Oral Daily   • Insulin Aspart Pen  1-7 Units Subcutaneous TID CC         Results:     Lab Results   Component Value Date    WBC 8.1 11/23/2019    HGB 13.2 11/23/2019    HCT 38.5 (L) 11/23/2019    .0 11/23/2019    CREATSERUM 1.05

## 2019-11-24 ENCOUNTER — TELEPHONE (OUTPATIENT)
Dept: MEDSURG UNIT | Facility: HOSPITAL | Age: 54
End: 2019-11-24

## 2019-11-25 ENCOUNTER — TELEPHONE (OUTPATIENT)
Dept: CASE MANAGEMENT | Age: 54
End: 2019-11-25

## 2019-11-27 PROBLEM — I21.11 ST ELEVATION MYOCARDIAL INFARCTION INVOLVING RIGHT CORONARY ARTERY (CMD): Status: ACTIVE | Noted: 2019-11-27

## 2019-11-27 PROBLEM — I25.10 CORONARY ARTERY DISEASE INVOLVING NATIVE CORONARY ARTERY OF NATIVE HEART WITHOUT ANGINA PECTORIS: Status: ACTIVE | Noted: 2019-11-27

## 2019-11-27 PROBLEM — Z95.5 PRESENCE OF CORONARY ANGIOPLASTY IMPLANT AND GRAFT: Status: ACTIVE | Noted: 2019-11-27

## 2019-11-27 PROBLEM — E11.59 TYPE 2 DIABETES MELLITUS WITH CIRCULATORY DISORDER (CMD): Status: ACTIVE | Noted: 2019-11-27

## 2019-11-27 PROBLEM — Z95.1 S/P CABG (CORONARY ARTERY BYPASS GRAFT): Status: ACTIVE | Noted: 2019-11-27

## 2019-11-27 PROBLEM — F17.210 DEPENDENCE ON NICOTINE FROM CIGARETTES: Status: ACTIVE | Noted: 2019-11-27

## 2019-11-27 PROBLEM — I10 ESSENTIAL HYPERTENSION: Status: ACTIVE | Noted: 2019-11-27

## 2019-11-27 PROBLEM — E78.2 MIXED HYPERLIPIDEMIA: Status: ACTIVE | Noted: 2019-11-27

## 2019-12-02 ENCOUNTER — OFFICE VISIT (OUTPATIENT)
Dept: CARDIOLOGY | Age: 54
End: 2019-12-02

## 2019-12-02 VITALS
OXYGEN SATURATION: 99 % | SYSTOLIC BLOOD PRESSURE: 96 MMHG | BODY MASS INDEX: 30.05 KG/M2 | WEIGHT: 176 LBS | HEART RATE: 68 BPM | HEIGHT: 64 IN | DIASTOLIC BLOOD PRESSURE: 62 MMHG

## 2019-12-02 DIAGNOSIS — I21.11 ST ELEVATION MYOCARDIAL INFARCTION INVOLVING RIGHT CORONARY ARTERY (CMD): ICD-10-CM

## 2019-12-02 DIAGNOSIS — I10 ESSENTIAL HYPERTENSION: ICD-10-CM

## 2019-12-02 DIAGNOSIS — Z95.5 PRESENCE OF CORONARY ANGIOPLASTY IMPLANT AND GRAFT: ICD-10-CM

## 2019-12-02 DIAGNOSIS — I25.10 CORONARY ARTERY DISEASE INVOLVING NATIVE CORONARY ARTERY OF NATIVE HEART WITHOUT ANGINA PECTORIS: Primary | ICD-10-CM

## 2019-12-02 PROCEDURE — 3074F SYST BP LT 130 MM HG: CPT | Performed by: NURSE PRACTITIONER

## 2019-12-02 PROCEDURE — 3078F DIAST BP <80 MM HG: CPT | Performed by: NURSE PRACTITIONER

## 2019-12-02 PROCEDURE — 99214 OFFICE O/P EST MOD 30 MIN: CPT | Performed by: NURSE PRACTITIONER

## 2019-12-02 RX ORDER — BLOOD-GLUCOSE METER
EACH MISCELLANEOUS
COMMUNITY
End: 2021-10-11 | Stop reason: SDUPTHER

## 2019-12-02 RX ORDER — METOPROLOL SUCCINATE 50 MG/1
50 TABLET, EXTENDED RELEASE ORAL DAILY
COMMUNITY
End: 2021-10-11 | Stop reason: SDUPTHER

## 2019-12-02 RX ORDER — CLOPIDOGREL BISULFATE 75 MG/1
75 TABLET ORAL DAILY
COMMUNITY
End: 2021-10-11 | Stop reason: SDUPTHER

## 2019-12-02 RX ORDER — LISINOPRIL 10 MG/1
10 TABLET ORAL DAILY
COMMUNITY

## 2019-12-02 RX ORDER — FENOFIBRATE 145 MG/1
145 TABLET, COATED ORAL DAILY
COMMUNITY

## 2019-12-02 RX ORDER — ATORVASTATIN CALCIUM 80 MG/1
80 TABLET, FILM COATED ORAL DAILY
COMMUNITY

## 2019-12-02 SDOH — HEALTH STABILITY: MENTAL HEALTH: HOW OFTEN DO YOU HAVE A DRINK CONTAINING ALCOHOL?: NEVER

## 2019-12-02 ASSESSMENT — PATIENT HEALTH QUESTIONNAIRE - PHQ9
SUM OF ALL RESPONSES TO PHQ9 QUESTIONS 1 AND 2: 0
SUM OF ALL RESPONSES TO PHQ9 QUESTIONS 1 AND 2: 0
2. FEELING DOWN, DEPRESSED OR HOPELESS: NOT AT ALL
1. LITTLE INTEREST OR PLEASURE IN DOING THINGS: NOT AT ALL

## 2020-03-03 RX ORDER — NICOTINE 21 MG/24HR
1 PATCH, TRANSDERMAL 24 HOURS TRANSDERMAL
COMMUNITY
Start: 2019-11-24 | End: 2021-10-11 | Stop reason: ALTCHOICE

## 2020-03-04 ENCOUNTER — APPOINTMENT (OUTPATIENT)
Dept: CARDIOLOGY | Age: 55
End: 2020-03-04

## 2020-05-01 ENCOUNTER — APPOINTMENT (OUTPATIENT)
Dept: CARDIOLOGY | Age: 55
End: 2020-05-01

## 2020-08-06 ENCOUNTER — TELEPHONE (OUTPATIENT)
Dept: ENDOCRINOLOGY CLINIC | Facility: CLINIC | Age: 55
End: 2020-08-06

## 2020-08-06 NOTE — TELEPHONE ENCOUNTER
[facility] sent fax requesting quincy ramos for patient and dr Ghanshyam Noble agreed. First available ok per provider  rn faxed reply to [facility] since called and state they are working remotely  [place] sent for scanning.

## 2020-11-09 ENCOUNTER — HOSPITAL ENCOUNTER (INPATIENT)
Facility: HOSPITAL | Age: 55
LOS: 1 days | Discharge: HOME | DRG: 247 | End: 2020-11-11
Attending: EMERGENCY MEDICINE | Admitting: HOSPITALIST
Payer: COMMERCIAL

## 2020-11-09 DIAGNOSIS — I20.0 UNSTABLE ANGINA (HCC): Primary | ICD-10-CM

## 2020-11-09 RX ORDER — METOPROLOL TARTRATE 5 MG/5ML
5 INJECTION INTRAVENOUS EVERY 6 HOURS
Status: DISCONTINUED | OUTPATIENT
Start: 2020-11-09 | End: 2020-11-11

## 2020-11-10 ENCOUNTER — APPOINTMENT (OUTPATIENT)
Dept: GENERAL RADIOLOGY | Facility: HOSPITAL | Age: 55
DRG: 247 | End: 2020-11-10
Attending: EMERGENCY MEDICINE
Payer: COMMERCIAL

## 2020-11-10 ENCOUNTER — APPOINTMENT (OUTPATIENT)
Dept: INTERVENTIONAL RADIOLOGY/VASCULAR | Facility: HOSPITAL | Age: 55
End: 2020-11-10
Attending: NURSE PRACTITIONER
Payer: COMMERCIAL

## 2020-11-10 PROBLEM — I20.0 UNSTABLE ANGINA (HCC): Status: ACTIVE | Noted: 2020-11-10

## 2020-11-10 PROCEDURE — 93459 L HRT ART/GRFT ANGIO: CPT | Performed by: INTERNAL MEDICINE

## 2020-11-10 PROCEDURE — 99152 MOD SED SAME PHYS/QHP 5/>YRS: CPT | Performed by: INTERNAL MEDICINE

## 2020-11-10 PROCEDURE — 4A023N7 MEASUREMENT OF CARDIAC SAMPLING AND PRESSURE, LEFT HEART, PERCUTANEOUS APPROACH: ICD-10-PCS | Performed by: INTERNAL MEDICINE

## 2020-11-10 PROCEDURE — B2111ZZ FLUOROSCOPY OF MULTIPLE CORONARY ARTERIES USING LOW OSMOLAR CONTRAST: ICD-10-PCS | Performed by: INTERNAL MEDICINE

## 2020-11-10 PROCEDURE — 027034Z DILATION OF CORONARY ARTERY, ONE ARTERY WITH DRUG-ELUTING INTRALUMINAL DEVICE, PERCUTANEOUS APPROACH: ICD-10-PCS | Performed by: INTERNAL MEDICINE

## 2020-11-10 PROCEDURE — B2181ZZ FLUOROSCOPY OF LEFT INTERNAL MAMMARY BYPASS GRAFT USING LOW OSMOLAR CONTRAST: ICD-10-PCS | Performed by: INTERNAL MEDICINE

## 2020-11-10 PROCEDURE — 92937 PRQ TRLUML REVSC CAB GRF 1: CPT | Performed by: INTERNAL MEDICINE

## 2020-11-10 PROCEDURE — 99223 1ST HOSP IP/OBS HIGH 75: CPT | Performed by: INTERNAL MEDICINE

## 2020-11-10 PROCEDURE — 99223 1ST HOSP IP/OBS HIGH 75: CPT | Performed by: HOSPITALIST

## 2020-11-10 PROCEDURE — B2121ZZ FLUOROSCOPY OF SINGLE CORONARY ARTERY BYPASS GRAFT USING LOW OSMOLAR CONTRAST: ICD-10-PCS | Performed by: INTERNAL MEDICINE

## 2020-11-10 PROCEDURE — 71045 X-RAY EXAM CHEST 1 VIEW: CPT | Performed by: EMERGENCY MEDICINE

## 2020-11-10 RX ORDER — LISINOPRIL 10 MG/1
10 TABLET ORAL DAILY
Status: DISCONTINUED | OUTPATIENT
Start: 2020-11-11 | End: 2020-11-11

## 2020-11-10 RX ORDER — HEPARIN SODIUM AND DEXTROSE 10000; 5 [USP'U]/100ML; G/100ML
INJECTION INTRAVENOUS CONTINUOUS
Status: DISCONTINUED | OUTPATIENT
Start: 2020-11-10 | End: 2020-11-11

## 2020-11-10 RX ORDER — NITROGLYCERIN 0.4 MG/1
0.4 TABLET SUBLINGUAL ONCE
Status: COMPLETED | OUTPATIENT
Start: 2020-11-10 | End: 2020-11-10

## 2020-11-10 RX ORDER — HEPARIN SODIUM AND DEXTROSE 10000; 5 [USP'U]/100ML; G/100ML
12 INJECTION INTRAVENOUS ONCE
Status: DISCONTINUED | OUTPATIENT
Start: 2020-11-10 | End: 2020-11-10

## 2020-11-10 RX ORDER — LIDOCAINE HYDROCHLORIDE 20 MG/ML
INJECTION, SOLUTION EPIDURAL; INFILTRATION; INTRACAUDAL; PERINEURAL
Status: COMPLETED
Start: 2020-11-10 | End: 2020-11-10

## 2020-11-10 RX ORDER — HEPARIN SODIUM 1000 [USP'U]/ML
INJECTION, SOLUTION INTRAVENOUS; SUBCUTANEOUS
Status: COMPLETED
Start: 2020-11-10 | End: 2020-11-10

## 2020-11-10 RX ORDER — ASPIRIN 81 MG/1
81 TABLET ORAL DAILY
Status: DISCONTINUED | OUTPATIENT
Start: 2020-11-10 | End: 2020-11-10

## 2020-11-10 RX ORDER — NICOTINE 21 MG/24HR
1 PATCH, TRANSDERMAL 24 HOURS TRANSDERMAL DAILY
Status: DISCONTINUED | OUTPATIENT
Start: 2020-11-10 | End: 2020-11-11

## 2020-11-10 RX ORDER — SODIUM CHLORIDE 0.9 % (FLUSH) 0.9 %
3 SYRINGE (ML) INJECTION AS NEEDED
Status: DISCONTINUED | OUTPATIENT
Start: 2020-11-10 | End: 2020-11-11

## 2020-11-10 RX ORDER — DEXTROSE MONOHYDRATE 25 G/50ML
50 INJECTION, SOLUTION INTRAVENOUS
Status: DISCONTINUED | OUTPATIENT
Start: 2020-11-10 | End: 2020-11-11

## 2020-11-10 RX ORDER — CLOPIDOGREL BISULFATE 75 MG/1
75 TABLET ORAL DAILY
Status: DISCONTINUED | OUTPATIENT
Start: 2020-11-10 | End: 2020-11-11

## 2020-11-10 RX ORDER — NITROGLYCERIN 20 MG/100ML
INJECTION INTRAVENOUS CONTINUOUS
Status: DISCONTINUED | OUTPATIENT
Start: 2020-11-10 | End: 2020-11-10

## 2020-11-10 RX ORDER — ASPIRIN 325 MG
325 TABLET ORAL DAILY
Status: DISCONTINUED | OUTPATIENT
Start: 2020-11-10 | End: 2020-11-11

## 2020-11-10 RX ORDER — HEPARIN SODIUM 1000 [USP'U]/ML
60 INJECTION, SOLUTION INTRAVENOUS; SUBCUTANEOUS ONCE
Status: COMPLETED | OUTPATIENT
Start: 2020-11-10 | End: 2020-11-10

## 2020-11-10 RX ORDER — METOPROLOL TARTRATE 5 MG/5ML
5 INJECTION INTRAVENOUS ONCE
Status: DISCONTINUED | OUTPATIENT
Start: 2020-11-10 | End: 2020-11-11

## 2020-11-10 RX ORDER — SODIUM CHLORIDE 9 MG/ML
INJECTION, SOLUTION INTRAVENOUS
Status: ACTIVE | OUTPATIENT
Start: 2020-11-11 | End: 2020-11-11

## 2020-11-10 RX ORDER — ONDANSETRON 2 MG/ML
4 INJECTION INTRAMUSCULAR; INTRAVENOUS EVERY 6 HOURS PRN
Status: DISCONTINUED | OUTPATIENT
Start: 2020-11-10 | End: 2020-11-11

## 2020-11-10 RX ORDER — CLOPIDOGREL BISULFATE 75 MG/1
TABLET ORAL
Status: COMPLETED
Start: 2020-11-10 | End: 2020-11-10

## 2020-11-10 RX ORDER — ATORVASTATIN CALCIUM 80 MG/1
80 TABLET, FILM COATED ORAL NIGHTLY
Status: DISCONTINUED | OUTPATIENT
Start: 2020-11-10 | End: 2020-11-11

## 2020-11-10 RX ORDER — MIDAZOLAM HYDROCHLORIDE 1 MG/ML
INJECTION INTRAMUSCULAR; INTRAVENOUS
Status: COMPLETED
Start: 2020-11-10 | End: 2020-11-10

## 2020-11-10 RX ORDER — HEPARIN SODIUM 1000 [USP'U]/ML
60 INJECTION, SOLUTION INTRAVENOUS; SUBCUTANEOUS ONCE
Status: DISCONTINUED | OUTPATIENT
Start: 2020-11-10 | End: 2020-11-10

## 2020-11-10 RX ORDER — CHLORHEXIDINE GLUCONATE 4 G/100ML
30 SOLUTION TOPICAL
Status: ACTIVE | OUTPATIENT
Start: 2020-11-11 | End: 2020-11-11

## 2020-11-10 RX ORDER — FENOFIBRATE 134 MG/1
134 CAPSULE ORAL
Status: DISCONTINUED | OUTPATIENT
Start: 2020-11-10 | End: 2020-11-11

## 2020-11-10 NOTE — PROCEDURES
[facility]    [facility] Cardiac Cath Procedure Note  [facility] Patient Status:  Inpatient    1965 MRN [id number]   Location [facility] 2W/SW Attending Roosvelt Habermann, MD   Hosp Day # 0 PCP OSEI Pugh 6 Kiswahili introducer sheath was inserted into the right femoral artery. Selective coronary angiography performed with JR4 catheter for RCA and JL4 catheter for LCA. Angiography performed in standard projections.       Selective graft angiography perfor

## 2020-11-10 NOTE — ED INITIAL ASSESSMENT (HPI)
Pt states he was sleeping when he woke up with midsternal 'burning' that radiated down to the left arm. Pt notes diaphoresis. Pt notes extensive cardiac history. MD at the bedside.      Cardiologist Dr. Andre Osborn and Dr. Juanito Yuan

## 2020-11-10 NOTE — PROGRESS NOTES
Dr. Alexandra Peer paged to notify    Dr. Rashawn Andrea, Cardiology paged to notify through . Results for Sindhu Harrell (MRN [id number]) as of 11/10/2020 08:31   Ref.  Range 11/10/2020 01:58 11/10/2020 06:54   TROPONIN Latest Ref Range: <0.045 ng/mL 1.74

## 2020-11-10 NOTE — ED PROVIDER NOTES
Patient Seen in: [facility] AND [facility] Emergency Department      History   Patient presents with:  Chest Pain Angina    Stated Complaint: Chest Pain    HPI    47year old male with a past medical history of asthma, diabetes, hypertension, high cholesterol, s 2358]   BP (!) 198/107   Pulse 86   Resp 20   Temp    Temp src    SpO2 100 %   O2 Device None (Room air)       Current:/78   Pulse 58   Resp 15   Ht 160 cm (5' 3\")   Wt 80 kg   SpO2 100%   BMI 31.24 kg/m²         Physical Exam  Vitals signs and nurs Reviewed   BASIC METABOLIC PANEL (8) - Abnormal; Notable for the following components:       Result Value    Glucose 225 (*)     Calculated Osmolality 297 (*)     All other components within normal limits   PROTHROMBIN TIME (PT) - Abnormal; Notable for the report has been electronically signed and verified by the Radiologist whose name is printed above.     DD:  11/10/2020/DT:  11/10/2020      Radiology exams  Viewed and reviewed by myself and findings discussed with patient including need for follow up    Cr Unknown

## 2020-11-10 NOTE — PROGRESS NOTES
Dr. Sharyle Epp at bedside, ok to keep Hep drip at current rate as patient is to go to Cath lab. Awaiting cath lab to take patient.       5372 patient off unit to Cath lab

## 2020-11-10 NOTE — H&P
[facility] Patient Status:  Inpatient    1965 MRN [id number]   Location [facility] 2W/SW Attending Roosvelt Habermann, MD   Hosp Day # 0 PCP Crystal Sanders,      Date: TIAs (cause of death)   • Lipids Mother         hyperlipidemia   • Hypertension Mother    • Stroke Mother         TIAs (cause of death)   • Diabetes Mother    • Heart Disease Sister         CVA   • Hypertension Sister    • Lipids Sister    • Heart Disease systems was completed. Pertinent positives and negatives noted in the the HPI. Physical Exam:   Vital Signs:  Blood pressure 139/76, pulse 85, temperature 98.1 °F (36.7 °C), temperature source Temporal, resp.  rate 25, height 5' 3\" (1.6 m), weight 17 by (CST): Abigail Samuels MD on 11/10/2020 at 6:56 AM     Finalized by (CST):  Abigail Samuels MD on 11/10/2020 at 7:02 AM          Ekg 12-lead    Result Date: 11/10/2020  ECG Report  Interpretation  --------------------------     Ekg 12-lead    Result

## 2020-11-10 NOTE — CONSULTS
CARDIOLOGY CONSULT - [facility]      NAME: Mily Alonso - ROOM:  - MRN: [id number] - Age: 47year old - :  1965    Date of Admission: 2020 11:48 PM  Admission Diagnosis: No admission diagnoses in a hospital admission)  insulin  Metformin  Fenofibrate  Metoprolol 50   plavix 75   Atorvastatin 80       Scheduled Medication:  • metoprolol Tartrate  5 mg Intravenous Once   • metoprolol Tartrate  5 mg Intravenous Q6H     Continuous Infusing Medicatio

## 2020-11-11 ENCOUNTER — APPOINTMENT (OUTPATIENT)
Dept: CV DIAGNOSTICS | Facility: HOSPITAL | Age: 55
DRG: 247 | End: 2020-11-11
Attending: NURSE PRACTITIONER
Payer: COMMERCIAL

## 2020-11-11 VITALS
DIASTOLIC BLOOD PRESSURE: 70 MMHG | HEIGHT: 63 IN | BODY MASS INDEX: 31.39 KG/M2 | RESPIRATION RATE: 27 BRPM | SYSTOLIC BLOOD PRESSURE: 142 MMHG | HEART RATE: 78 BPM | TEMPERATURE: 98 F | OXYGEN SATURATION: 98 % | WEIGHT: 177.19 LBS

## 2020-11-11 PROCEDURE — 93308 TTE F-UP OR LMTD: CPT | Performed by: NURSE PRACTITIONER

## 2020-11-11 PROCEDURE — 99239 HOSP IP/OBS DSCHRG MGMT >30: CPT | Performed by: HOSPITALIST

## 2020-11-11 PROCEDURE — 99232 SBSQ HOSP IP/OBS MODERATE 35: CPT | Performed by: INTERNAL MEDICINE

## 2020-11-11 RX ORDER — POTASSIUM CHLORIDE 20 MEQ/1
40 TABLET, EXTENDED RELEASE ORAL EVERY 4 HOURS
Status: COMPLETED | OUTPATIENT
Start: 2020-11-11 | End: 2020-11-11

## 2020-11-11 RX ORDER — CLOPIDOGREL BISULFATE 75 MG/1
75 TABLET ORAL DAILY
Qty: 30 TABLET | Refills: 0 | Status: SHIPPED | OUTPATIENT
Start: 2020-11-11

## 2020-11-11 RX ORDER — NICOTINE 21 MG/24HR
1 PATCH, TRANSDERMAL 24 HOURS TRANSDERMAL DAILY
Qty: 30 PATCH | Refills: 0 | Status: ON HOLD | OUTPATIENT
Start: 2020-11-11 | End: 2021-09-15

## 2020-11-11 NOTE — PROGRESS NOTES
[facility]  Cardiology Progress Note    Sterling Low Patient Status:  Inpatient    1965 MRN [id number]   Location [facility] 2W/SW Attending Vangie Bhandari MD   [facility] Day # 1 PCP DINA Martinez       Subjective 17 13 12   CREATSERUM 1.06 0.84 0.99   CA 9.9 10.4* 9.7   * 169* 148*       Recent Labs   Lab 11/11/20  0445   ALT 29   *   ALB 3.6       Recent Labs   Lab 11/10/20  0005 11/10/20  0158 11/10/20  0654   TROP <0.045 1.740* 8.090*       Allergi Q6H          Assessment:  Unstable angina,  CAD with history of CABG  Hypertension  Familial hyperlipidemia      Plan:  Continue aspirin and Plavix for minimum of 12 months ideally lifelong  Continue rest of the cardiac medications  Multiple attempts have

## 2020-11-11 NOTE — PLAN OF CARE
Problem: Patient Centered Care  Goal: Patient preferences are identified and integrated in the patient's plan of care  Description: Interventions:  - What would you like us to know as we care for you?  Have wife and kid  - Provide timely, complete, and ac

## 2020-11-11 NOTE — PROGRESS NOTES
[facility]    Progress Note    Leander Pan Patient Status:  Inpatient    1965 MRN [id number]   Location [facility] 2W/SW Attending Jolyn Libman, MD   [address] Day # 1 PCP DINA Ackerman        Subject recommendations from last year     Tobacco abuse  -counseled    VTE ppx: will defer to cards     Greater than 35 min spent with >50% time spent counseling patient re: treatment and plan        Results:     Lab Results   Component Value Date    WBC 8.8 11/1 MD Reuben Haynes MD  11/11/2020

## 2020-11-11 NOTE — PLAN OF CARE
VSS. On RA; denies SOB. Mild pain on R groin incision site; soft no hematoma seen. Pulse strong/palpable. Uses urina wo issues. Call light within reach. Will cont to monitor.        Problem: Patient Centered Care  Goal: Patient preferences are identified

## 2020-11-11 NOTE — PLAN OF CARE
Patient discharged. Avs reviewed. Scripts provided. All questions answered. Smoking cessation education handout provided. Encouraged to schedule follow up appointment with cardiology and PCP in 1 week.  Per patient cannot see [facility] due to insurance cove

## 2020-11-12 ENCOUNTER — TELEPHONE (OUTPATIENT)
Dept: MEDSURG UNIT | Facility: HOSPITAL | Age: 55
End: 2020-11-12

## 2020-11-12 NOTE — DISCHARGE SUMMARY
[facility]    PATIENT'S NAME: Sindhu Harrell   ATTENDING PHYSICIAN: Marly Valdes MD   PATIENT ACCOUNT#:   [id number]    LOCATION:  [address] [place] [address] #:   [id number]       YOB: 1965  ADMISSION DATE: Temperature 98.6, pulse 76, respirations 15, blood pressure 142/70, saturating 97% on room air. GENERAL:  The patient lying in bed, appears to be in no acute distress at this time. He is A and O x3. HEENT:  Extraocular movements are intact.   Pupils [place]

## 2021-01-18 ENCOUNTER — HOSPITAL ENCOUNTER (OUTPATIENT)
Age: 56
Discharge: STILL ADMITTED OR ADMINISTRATIVE | End: 2021-01-18
Payer: COMMERCIAL

## 2021-07-17 ENCOUNTER — LAB ENCOUNTER (OUTPATIENT)
Dept: LAB | Facility: HOSPITAL | Age: 56
End: 2021-07-17
Attending: NURSE PRACTITIONER
Payer: COMMERCIAL

## 2021-07-17 DIAGNOSIS — E11.65 UNCONTROLLED TYPE 2 DIABETES MELLITUS WITH HYPERGLYCEMIA (HCC): Primary | ICD-10-CM

## 2021-07-17 DIAGNOSIS — Z12.11 SCREENING FOR MALIGNANT NEOPLASM OF COLON: ICD-10-CM

## 2021-07-17 LAB
ALBUMIN SERPL-MCNC: 3.8 G/DL (ref 3.4–5)
ALBUMIN/GLOB SERPL: 1 {RATIO} (ref 1–2)
ALP LIVER SERPL-CCNC: 76 U/L
ALT SERPL-CCNC: 26 U/L
ANION GAP SERPL CALC-SCNC: 5 MMOL/L (ref 0–18)
AST SERPL-CCNC: 24 U/L (ref 15–37)
BASOPHILS # BLD AUTO: 0.05 X10(3) UL (ref 0–0.2)
BASOPHILS NFR BLD AUTO: 0.8 %
BILIRUB SERPL-MCNC: 0.5 MG/DL (ref 0.1–2)
BILIRUB UR QL: NEGATIVE
BUN BLD-MCNC: 16 MG/DL (ref 7–18)
BUN/CREAT SERPL: 16.8 (ref 10–20)
CALCIUM BLD-MCNC: 10.1 MG/DL (ref 8.5–10.1)
CHLORIDE SERPL-SCNC: 105 MMOL/L (ref 98–112)
CHOLEST SMN-MCNC: 252 MG/DL (ref ?–200)
CLARITY UR: CLEAR
CO2 SERPL-SCNC: 28 MMOL/L (ref 21–32)
COLOR UR: YELLOW
CREAT BLD-MCNC: 0.95 MG/DL
CREAT UR-SCNC: 172 MG/DL
DEPRECATED RDW RBC AUTO: 40.6 FL (ref 35.1–46.3)
EOSINOPHIL # BLD AUTO: 0.25 X10(3) UL (ref 0–0.7)
EOSINOPHIL NFR BLD AUTO: 3.9 %
ERYTHROCYTE [DISTWIDTH] IN BLOOD BY AUTOMATED COUNT: 12.2 % (ref 11–15)
GLOBULIN PLAS-MCNC: 3.9 G/DL (ref 2.8–4.4)
GLUCOSE BLD-MCNC: 228 MG/DL (ref 70–99)
GLUCOSE UR-MCNC: >=500 MG/DL
HCT VFR BLD AUTO: 45.9 %
HDLC SERPL-MCNC: 26 MG/DL (ref 40–59)
HGB BLD-MCNC: 15.9 G/DL
HGB UR QL STRIP.AUTO: NEGATIVE
IMM GRANULOCYTES # BLD AUTO: 0.01 X10(3) UL (ref 0–1)
IMM GRANULOCYTES NFR BLD: 0.2 %
KETONES UR-MCNC: NEGATIVE MG/DL
LDLC SERPL DIRECT ASSAY-MCNC: 114 MG/DL (ref ?–100)
LEUKOCYTE ESTERASE UR QL STRIP.AUTO: NEGATIVE
LYMPHOCYTES # BLD AUTO: 2.69 X10(3) UL (ref 1–4)
LYMPHOCYTES NFR BLD AUTO: 41.6 %
M PROTEIN MFR SERPL ELPH: 7.7 G/DL (ref 6.4–8.2)
MCH RBC QN AUTO: 31.6 PG (ref 26–34)
MCHC RBC AUTO-ENTMCNC: 34.6 G/DL (ref 31–37)
MCV RBC AUTO: 91.3 FL
MICROALBUMIN UR-MCNC: 7.52 MG/DL
MICROALBUMIN/CREAT 24H UR-RTO: 43.7 UG/MG (ref ?–30)
MONOCYTES # BLD AUTO: 0.44 X10(3) UL (ref 0.1–1)
MONOCYTES NFR BLD AUTO: 6.8 %
NEUTROPHILS # BLD AUTO: 3.02 X10 (3) UL (ref 1.5–7.7)
NEUTROPHILS # BLD AUTO: 3.02 X10(3) UL (ref 1.5–7.7)
NEUTROPHILS NFR BLD AUTO: 46.7 %
NITRITE UR QL STRIP.AUTO: NEGATIVE
NONHDLC SERPL-MCNC: 226 MG/DL (ref ?–130)
OSMOLALITY SERPL CALC.SUM OF ELEC: 294 MOSM/KG (ref 275–295)
PATIENT FASTING Y/N/NP: YES
PATIENT FASTING Y/N/NP: YES
PH UR: 5 [PH] (ref 5–8)
PLATELET # BLD AUTO: 200 10(3)UL (ref 150–450)
POTASSIUM SERPL-SCNC: 4.4 MMOL/L (ref 3.5–5.1)
PROT UR-MCNC: NEGATIVE MG/DL
RBC # BLD AUTO: 5.03 X10(6)UL
SODIUM SERPL-SCNC: 138 MMOL/L (ref 136–145)
SP GR UR STRIP: 1.02 (ref 1–1.03)
TRIGL SERPL-MCNC: 1055 MG/DL (ref 30–149)
TSI SER-ACNC: 2.15 MIU/ML (ref 0.36–3.74)
UROBILINOGEN UR STRIP-ACNC: <2
WBC # BLD AUTO: 6.5 X10(3) UL (ref 4–11)

## 2021-07-17 PROCEDURE — 84443 ASSAY THYROID STIM HORMONE: CPT

## 2021-07-17 PROCEDURE — 85025 COMPLETE CBC W/AUTO DIFF WBC: CPT

## 2021-07-17 PROCEDURE — 36415 COLL VENOUS BLD VENIPUNCTURE: CPT

## 2021-07-17 PROCEDURE — 80053 COMPREHEN METABOLIC PANEL: CPT

## 2021-07-17 PROCEDURE — 81003 URINALYSIS AUTO W/O SCOPE: CPT

## 2021-07-17 PROCEDURE — 82043 UR ALBUMIN QUANTITATIVE: CPT

## 2021-07-17 PROCEDURE — 80061 LIPID PANEL: CPT

## 2021-07-17 PROCEDURE — 83721 ASSAY OF BLOOD LIPOPROTEIN: CPT

## 2021-07-17 PROCEDURE — 82570 ASSAY OF URINE CREATININE: CPT

## 2021-09-14 ENCOUNTER — APPOINTMENT (OUTPATIENT)
Dept: GENERAL RADIOLOGY | Facility: HOSPITAL | Age: 56
DRG: 391 | End: 2021-09-14
Attending: EMERGENCY MEDICINE
Payer: COMMERCIAL

## 2021-09-14 ENCOUNTER — APPOINTMENT (OUTPATIENT)
Dept: ULTRASOUND IMAGING | Facility: HOSPITAL | Age: 56
DRG: 391 | End: 2021-09-14
Attending: EMERGENCY MEDICINE
Payer: COMMERCIAL

## 2021-09-14 ENCOUNTER — APPOINTMENT (OUTPATIENT)
Dept: CT IMAGING | Facility: HOSPITAL | Age: 56
DRG: 391 | End: 2021-09-14
Attending: EMERGENCY MEDICINE
Payer: COMMERCIAL

## 2021-09-14 ENCOUNTER — HOSPITAL ENCOUNTER (INPATIENT)
Facility: HOSPITAL | Age: 56
LOS: 1 days | Discharge: HOME | DRG: 391 | End: 2021-09-16
Attending: EMERGENCY MEDICINE | Admitting: HOSPITALIST
Payer: COMMERCIAL

## 2021-09-14 DIAGNOSIS — R77.8 ELEVATED TROPONIN: Primary | ICD-10-CM

## 2021-09-14 DIAGNOSIS — R07.9 ACUTE CHEST PAIN: ICD-10-CM

## 2021-09-14 PROBLEM — R79.89 ELEVATED TROPONIN: Status: ACTIVE | Noted: 2021-01-01

## 2021-09-14 PROCEDURE — 84484 ASSAY OF TROPONIN QUANT: CPT | Performed by: EMERGENCY MEDICINE

## 2021-09-14 PROCEDURE — 80048 BASIC METABOLIC PNL TOTAL CA: CPT | Performed by: EMERGENCY MEDICINE

## 2021-09-14 PROCEDURE — 80061 LIPID PANEL: CPT | Performed by: EMERGENCY MEDICINE

## 2021-09-14 PROCEDURE — 84484 ASSAY OF TROPONIN QUANT: CPT

## 2021-09-14 PROCEDURE — 82550 ASSAY OF CK (CPK): CPT | Performed by: EMERGENCY MEDICINE

## 2021-09-14 PROCEDURE — 85025 COMPLETE CBC W/AUTO DIFF WBC: CPT

## 2021-09-14 PROCEDURE — 74174 CTA ABD&PLVS W/CONTRAST: CPT | Performed by: EMERGENCY MEDICINE

## 2021-09-14 PROCEDURE — 71275 CT ANGIOGRAPHY CHEST: CPT | Performed by: EMERGENCY MEDICINE

## 2021-09-14 PROCEDURE — 96375 TX/PRO/DX INJ NEW DRUG ADDON: CPT

## 2021-09-14 PROCEDURE — 80048 BASIC METABOLIC PNL TOTAL CA: CPT

## 2021-09-14 PROCEDURE — 93010 ELECTROCARDIOGRAM REPORT: CPT | Performed by: EMERGENCY MEDICINE

## 2021-09-14 PROCEDURE — 93005 ELECTROCARDIOGRAM TRACING: CPT

## 2021-09-14 PROCEDURE — 85025 COMPLETE CBC W/AUTO DIFF WBC: CPT | Performed by: EMERGENCY MEDICINE

## 2021-09-14 PROCEDURE — 85730 THROMBOPLASTIN TIME PARTIAL: CPT | Performed by: EMERGENCY MEDICINE

## 2021-09-14 PROCEDURE — 99285 EMERGENCY DEPT VISIT HI MDM: CPT

## 2021-09-14 PROCEDURE — 80061 LIPID PANEL: CPT

## 2021-09-14 PROCEDURE — 71045 X-RAY EXAM CHEST 1 VIEW: CPT | Performed by: EMERGENCY MEDICINE

## 2021-09-14 PROCEDURE — 96374 THER/PROPH/DIAG INJ IV PUSH: CPT

## 2021-09-14 PROCEDURE — 83036 HEMOGLOBIN GLYCOSYLATED A1C: CPT | Performed by: HOSPITALIST

## 2021-09-14 PROCEDURE — 93971 EXTREMITY STUDY: CPT | Performed by: EMERGENCY MEDICINE

## 2021-09-14 RX ORDER — ASPIRIN 81 MG/1
324 TABLET, CHEWABLE ORAL ONCE
Status: COMPLETED | OUTPATIENT
Start: 2021-09-14 | End: 2021-09-14

## 2021-09-14 RX ORDER — HEPARIN SODIUM AND DEXTROSE 10000; 5 [USP'U]/100ML; G/100ML
12 INJECTION INTRAVENOUS ONCE
Status: COMPLETED | OUTPATIENT
Start: 2021-09-14 | End: 2021-09-15

## 2021-09-14 RX ORDER — ORPHENADRINE CITRATE 30 MG/ML
30 INJECTION INTRAMUSCULAR; INTRAVENOUS ONCE
Status: COMPLETED | OUTPATIENT
Start: 2021-09-14 | End: 2021-09-15

## 2021-09-14 RX ORDER — MORPHINE SULFATE 4 MG/ML
4 INJECTION, SOLUTION INTRAMUSCULAR; INTRAVENOUS ONCE
Status: COMPLETED | OUTPATIENT
Start: 2021-09-14 | End: 2021-09-14

## 2021-09-14 RX ORDER — HEPARIN SODIUM 1000 [USP'U]/ML
60 INJECTION, SOLUTION INTRAVENOUS; SUBCUTANEOUS ONCE
Status: COMPLETED | OUTPATIENT
Start: 2021-09-14 | End: 2021-09-15

## 2021-09-15 ENCOUNTER — APPOINTMENT (OUTPATIENT)
Dept: ULTRASOUND IMAGING | Facility: HOSPITAL | Age: 56
DRG: 391 | End: 2021-09-15
Attending: NURSE PRACTITIONER
Payer: COMMERCIAL

## 2021-09-15 PROCEDURE — 85730 THROMBOPLASTIN TIME PARTIAL: CPT | Performed by: EMERGENCY MEDICINE

## 2021-09-15 PROCEDURE — 82962 GLUCOSE BLOOD TEST: CPT

## 2021-09-15 PROCEDURE — 83735 ASSAY OF MAGNESIUM: CPT | Performed by: HOSPITALIST

## 2021-09-15 PROCEDURE — 80048 BASIC METABOLIC PNL TOTAL CA: CPT | Performed by: HOSPITALIST

## 2021-09-15 PROCEDURE — 85027 COMPLETE CBC AUTOMATED: CPT | Performed by: INTERNAL MEDICINE

## 2021-09-15 PROCEDURE — 93926 LOWER EXTREMITY STUDY: CPT | Performed by: NURSE PRACTITIONER

## 2021-09-15 RX ORDER — ACETAMINOPHEN 325 MG/1
650 TABLET ORAL EVERY 4 HOURS PRN
Status: DISCONTINUED | OUTPATIENT
Start: 2021-09-15 | End: 2021-09-16

## 2021-09-15 RX ORDER — BISACODYL 10 MG
10 SUPPOSITORY, RECTAL RECTAL
Status: DISCONTINUED | OUTPATIENT
Start: 2021-09-15 | End: 2021-09-16

## 2021-09-15 RX ORDER — POLYETHYLENE GLYCOL 3350 17 G/17G
17 POWDER, FOR SOLUTION ORAL DAILY PRN
Status: DISCONTINUED | OUTPATIENT
Start: 2021-09-15 | End: 2021-09-16

## 2021-09-15 RX ORDER — SODIUM PHOSPHATE, DIBASIC AND SODIUM PHOSPHATE, MONOBASIC 7; 19 G/133ML; G/133ML
1 ENEMA RECTAL ONCE AS NEEDED
Status: DISCONTINUED | OUTPATIENT
Start: 2021-09-15 | End: 2021-09-16

## 2021-09-15 RX ORDER — FENOFIBRATE 134 MG/1
134 CAPSULE ORAL
Refills: 3 | Status: DISCONTINUED | OUTPATIENT
Start: 2021-09-15 | End: 2021-09-16

## 2021-09-15 RX ORDER — ASPIRIN 81 MG/1
81 TABLET, CHEWABLE ORAL DAILY
Status: DISCONTINUED | OUTPATIENT
Start: 2021-09-15 | End: 2021-09-16

## 2021-09-15 RX ORDER — DEXTROSE MONOHYDRATE 25 G/50ML
50 INJECTION, SOLUTION INTRAVENOUS
Status: DISCONTINUED | OUTPATIENT
Start: 2021-09-15 | End: 2021-09-16

## 2021-09-15 RX ORDER — PROCHLORPERAZINE EDISYLATE 5 MG/ML
5 INJECTION INTRAMUSCULAR; INTRAVENOUS EVERY 8 HOURS PRN
Status: DISCONTINUED | OUTPATIENT
Start: 2021-09-15 | End: 2021-09-16

## 2021-09-15 RX ORDER — ATORVASTATIN CALCIUM 80 MG/1
80 TABLET, FILM COATED ORAL NIGHTLY
Status: DISCONTINUED | OUTPATIENT
Start: 2021-09-16 | End: 2021-09-16

## 2021-09-15 RX ORDER — MORPHINE SULFATE 2 MG/ML
2 INJECTION, SOLUTION INTRAMUSCULAR; INTRAVENOUS EVERY 2 HOUR PRN
Status: DISCONTINUED | OUTPATIENT
Start: 2021-09-15 | End: 2021-09-16

## 2021-09-15 RX ORDER — CLOPIDOGREL BISULFATE 75 MG/1
75 TABLET ORAL DAILY
Status: DISCONTINUED | OUTPATIENT
Start: 2021-09-15 | End: 2021-09-16

## 2021-09-15 RX ORDER — MORPHINE SULFATE 2 MG/ML
1 INJECTION, SOLUTION INTRAMUSCULAR; INTRAVENOUS EVERY 2 HOUR PRN
Status: DISCONTINUED | OUTPATIENT
Start: 2021-09-15 | End: 2021-09-16

## 2021-09-15 RX ORDER — ONDANSETRON 2 MG/ML
4 INJECTION INTRAMUSCULAR; INTRAVENOUS EVERY 6 HOURS PRN
Status: DISCONTINUED | OUTPATIENT
Start: 2021-09-15 | End: 2021-09-16

## 2021-09-15 RX ORDER — HYDROCODONE BITARTRATE AND ACETAMINOPHEN 5; 325 MG/1; MG/1
2 TABLET ORAL EVERY 4 HOURS PRN
Status: DISCONTINUED | OUTPATIENT
Start: 2021-09-15 | End: 2021-09-16

## 2021-09-15 RX ORDER — MORPHINE SULFATE 4 MG/ML
4 INJECTION, SOLUTION INTRAMUSCULAR; INTRAVENOUS EVERY 2 HOUR PRN
Status: DISCONTINUED | OUTPATIENT
Start: 2021-09-15 | End: 2021-09-16

## 2021-09-15 RX ORDER — HYDROCODONE BITARTRATE AND ACETAMINOPHEN 5; 325 MG/1; MG/1
1 TABLET ORAL EVERY 4 HOURS PRN
Status: DISCONTINUED | OUTPATIENT
Start: 2021-09-15 | End: 2021-09-16

## 2021-09-15 RX ORDER — METOPROLOL SUCCINATE 50 MG/1
50 TABLET, EXTENDED RELEASE ORAL
Status: DISCONTINUED | OUTPATIENT
Start: 2021-09-15 | End: 2021-09-16

## 2021-09-15 RX ORDER — HEPARIN SODIUM AND DEXTROSE 10000; 5 [USP'U]/100ML; G/100ML
INJECTION INTRAVENOUS CONTINUOUS
Status: DISCONTINUED | OUTPATIENT
Start: 2021-09-15 | End: 2021-09-15

## 2021-09-15 NOTE — PLAN OF CARE
Cardiology consult today, heparin drip discontinued, arterial ultrasound of left leg ordered, normal, continuing insulin coverage, pain medication as needed, possibility for cath tomorrow    Problem: SAFETY ADULT - FALL  Goal: Free from fall injury  Carol to patient/family  - Incorporate patient and family knowledge, values, beliefs, and cultural backgrounds into the planning and delivery of care  - Encourage patient/family to participate in care and decision-making at the level they choose  - Honor patient RN  Outcome: Not Progressing     Problem: METABOLIC/FLUID AND ELECTROLYTES - ADULT  Goal: Glucose maintained within prescribed range  Description: INTERVENTIONS:  - Monitor Blood Glucose as ordered  - Assess for signs and symptoms of hyperglycemia and hypo

## 2021-09-15 NOTE — CONSULTS
[facility]  Cardiology Consultation    [facility] Emergency Department 97 Patient Status:  Inpatient    1965 MRN [id number]   Location [facility]5W Attending Ubaldo Bridges MD   Hosp Day # 0 PCP Dairedgardo Louiss, APRN     Reason for Consultation:  Pa Lipids Brother       reports that he has been smoking cigarettes. He has a 15.00 pack-year smoking history. He has never used smokeless tobacco. He reports that he does not drink alcohol and does not use drugs.     Allergies:  No Known Allergies    Medicati mg, 134 mg, Oral, Daily with breakfast  •  metoprolol succinate (Toprol XL) 24 hr tab 50 mg, 50 mg, Oral, Daily Beta Blocker    Review of Systems:  A comprehensive review of systems was negative if not otherwise mention in above HPI.     /66 (BP Locat having any symptoms  Left foot pain and groin pain  Hypertension  Hyperlipidemia  Diabetes      Recommendations:  Ultrasound of left groin and arterial duplex of left lower extremity  If the above findings are unremarkable okay to discharge and follow-up w

## 2021-09-15 NOTE — H&P
ELNNIE Hospitalist H&P       CC: Patient presents with:  Chest Pain Angina  Leg or Foot Injury       PCP: DINA Ritter    History of Present Illness: Patient is a 54year old male with PMH sig for CAD, s/p CABG and stents, DM, GERD, HTN, HL who pres Suspension, Inject 30 Units into the skin 2 (two) times daily.   , Disp: , Rfl:   MetFORMIN HCl (GLUCOPHAGE) 1000 MG Oral Tab, Take 850 mg by mouth daily with breakfast., Disp: , Rfl: 2          Soc Hx  Social History    Tobacco Use      Smoking status: Cur No rashes or lesions.     Neurologic: Normal strength, no focal deficit appreciated     Diagnostic Data:    CBC/Chem  Recent Labs   Lab 09/14/21  1955 09/15/21  0830   WBC 10.1 9.7   HGB 14.8 14.9   MCV 89.7 91.8   .0 199.0       Recent Labs   Lab 09 narrowing of the left common femoral vein at this site. Recommend correlation with Doppler ultrasound. 4. Reflux of contrast in the hepatic veins may suggest some degree of right cardiac failure. 5. Cyst in the midpole the right kidney.  6. The examinatio agreeing with therapeutic plan as outlined    Thank Hillary Harris MD    [facility] Hospitalist  Answering Service number: [phone]

## 2021-09-15 NOTE — ED INITIAL ASSESSMENT (HPI)
Patient to ER from home with c/o left leg pain after cardiac cath through left groin 3 days ago. Patient report difficulty walking. Patient also reports intermittent chest pain since cath. Patient has 6 cardiac stents.

## 2021-09-15 NOTE — ED PROVIDER NOTES
Patient Seen in: [facility] AND [facility] Emergency Department    History   Patient presents with:  Chest Pain Angina  Leg or Foot Injury    Stated Complaint: cath 3 days ago numbness     HPI    55-year-old male with past medical history of diabetes, hypertension Blocker. Patient taking differently: Take 50 mg by mouth daily. atorvastatin 80 MG Oral Tab,  Take 1 tablet (80 mg total) by mouth nightly. lisinopril 10 MG Oral Tab,  Take 10 mg by mouth once daily.    Blood Glucose Monitoring Suppl (ONETOUCH ULTRA 09/14/21 2230 98 °F (36.7 °C)   Temp src 09/14/21 2230 Oral   SpO2 09/14/21 1946 100 %   O2 Device 09/14/21 1946 None (Room air)       Current:/88   Pulse 79   Temp 98 °F (36.7 °C) (Oral)   Resp 16   Ht 160 cm (5' 3\")   Wt 79.4 kg   SpO2 100%   BMI ------                     CBC W/ DIFFERENTIAL[[id number]]                              Final result                 Please view results for these tests on the individual orders.    RAINBOW DRAW LAVENDER   RAINBOW DRAW LIGHT GREEN   RAINBOW DRAW GOLD   CBC and pelvis with IV contrast    Comparison: Abdomen and pelvis CT of 9/24/17 and chest and abdomen CT of 9/15/15    IMPRESSION:    CTA chest:    No evidence of pulmonary artery embolus or thoracic aortic dissection.     Prominent coronary artery calcificatio thrombosis in the left lower extremity veins. Results finalized at 1:25 AM DERRICK Dixon M.D. This report has been electronically signed and verified by the Radiologist whose name is printed above.     DD:  09/14/2021/DT:  09/15/2021     This pathology ruled out and discussion with DANIELLE Pringle; patient admitted to on call medicine/[facility] hospitalist Dr Maria Elena Sampson.     A total of 32 minutes of critical care time (exclusive of billable procedures) was administered to manage the patient's critical

## 2021-09-15 NOTE — ED QUICK NOTES
Orders for admission, patient is aware of plan and ready to go upstairs. Any questions please call ED TALYOR Estrella at extension [phone].      Type of COVID test sent: negative  COVID Suspicion level: LOW    Titratable drugs infusing:  Rate: n/a    LOC at time of

## 2021-09-15 NOTE — DIETARY NOTE
Pt was seen today for elevated A1c of 8.9%. Pt reports he doesn't follow a diabetic diet and also doesn't check his blood sugars at home. He reports that he can tell when his blood sugars are low \"when I become sweaty and dizzy. \"  He also reports \"it

## 2021-09-16 VITALS
OXYGEN SATURATION: 97 % | DIASTOLIC BLOOD PRESSURE: 63 MMHG | HEIGHT: 63 IN | WEIGHT: 164.88 LBS | HEART RATE: 85 BPM | TEMPERATURE: 98 F | BODY MASS INDEX: 29.21 KG/M2 | RESPIRATION RATE: 20 BRPM | SYSTOLIC BLOOD PRESSURE: 131 MMHG

## 2021-09-16 PROCEDURE — 80048 BASIC METABOLIC PNL TOTAL CA: CPT | Performed by: INTERNAL MEDICINE

## 2021-09-16 PROCEDURE — 85025 COMPLETE CBC W/AUTO DIFF WBC: CPT | Performed by: INTERNAL MEDICINE

## 2021-09-16 PROCEDURE — 83735 ASSAY OF MAGNESIUM: CPT | Performed by: INTERNAL MEDICINE

## 2021-09-16 NOTE — DISCHARGE SUMMARY
General Medicine Discharge Summary     Patient ID:  Patricia Chaudhary  54year old  12/22/1965    Admit date: 9/14/2021    Discharge date and time: No discharge date for patient encounter.  9/16/21    Attending Physician: Santino Soto MD     Consults: IP recent cath on 9/10/21 at outside hospital   - CT findings as above  - ultrasound with no DVT  - heparin drip started and stopped by cards  - cards on consult   - ABF with no pseudoaneurysm, AV fistula or other vascular abnormality.   - no further imaging p fat in the left inguinal region more likely from recent cardiac catheterization and intervention. Questionable narrowing of the left common femoral vein at this site. Recommend correlation with Doppler ultrasound.  4. Reflux of contrast in the hepatic vei DINA Hightower In 1 week. Specialty: Nurse Practitioner  Contact information:  [address] [facility] [address] [facility]             Emeka Malin MD In 2 weeks.     Specialties: Interventional, Cardiology, CARDIOLOGY

## 2021-09-16 NOTE — PROGRESS NOTES
[facility]      Chief complaint:  Left leg pain    Subjective:  No chest pain or dyspnea but still bothered by left groin pain, left thigh numb/tingling and tenderness dorsum of left foot    Objective:  /63 (BP Location: Right a Subcutaneous TID CC   • clopidogrel  75 mg Oral Daily   • aspirin  81 mg Oral Daily   • atorvastatin  80 mg Oral Nightly   • fenofibrate micronized  134 mg Oral Daily with breakfast   • metoprolol succinate  50 mg Oral Daily Beta Blocker     Assessment:  ·

## 2021-09-16 NOTE — PLAN OF CARE
Vitals stable. Alert, on room air, ambulates independently. Groin pain managed with norco and morphine. Call light and belongings within reach.     Problem: Patient Centered Care  Goal: Patient preferences are identified and integrated in the patient's plan as appropriate  Outcome: Progressing     Problem: SAFETY ADULT - FALL  Goal: Free from fall injury  Description: INTERVENTIONS:  - Assess pt frequently for physical needs  - Identify cognitive and physical deficits and behaviors that affect risk of falls.

## 2021-09-16 NOTE — PLAN OF CARE
No further testing to be done, plan to discharge home today    Problem: Patient Centered Care  Goal: Patient preferences are identified and integrated in the patient's plan of care  Description: Interventions:  - What would you like us to know as we care f Problem: SAFETY ADULT - FALL  Goal: Free from fall injury  Description: INTERVENTIONS:  - Assess pt frequently for physical needs  - Identify cognitive and physical deficits and behaviors that affect risk of falls.   - Rice fall precautions as indica

## 2021-09-20 ENCOUNTER — PATIENT OUTREACH (OUTPATIENT)
Dept: CASE MANAGEMENT | Age: 56
End: 2021-09-20

## 2021-09-20 NOTE — PROGRESS NOTES
1st attempt at scheduling         Meron Disla [place]   [phone]   Unable to contact patient   Left voice mail    Will try again

## 2021-09-21 NOTE — PROGRESS NOTES
2nd attempt at scheduling         Kiki Carmen Hoffman   [phone]        Patient advised he will call at a later time to schedule

## 2021-09-22 ENCOUNTER — PATIENT OUTREACH (OUTPATIENT)
Dept: CASE MANAGEMENT | Age: 56
End: 2021-09-22

## 2021-10-06 ENCOUNTER — LAB ENCOUNTER (OUTPATIENT)
Dept: LAB | Facility: HOSPITAL | Age: 56
End: 2021-10-06
Attending: INTERNAL MEDICINE
Payer: COMMERCIAL

## 2021-10-06 DIAGNOSIS — E78.5 HYPERLIPIDEMIA: ICD-10-CM

## 2021-10-06 DIAGNOSIS — I25.83 CORONARY ATHEROSCLEROSIS DUE TO LIPID RICH PLAQUE (CODE): ICD-10-CM

## 2021-10-06 DIAGNOSIS — I21.9: Primary | ICD-10-CM

## 2021-10-06 PROCEDURE — 80061 LIPID PANEL: CPT

## 2021-10-06 PROCEDURE — 36415 COLL VENOUS BLD VENIPUNCTURE: CPT

## 2021-10-06 NOTE — H&P
The above referenced H&P was reviewed by Alesia Finch MD on 10/18/2021, the patient was examined and no significant changes have occurred in the patient's condition since the H&P was performed.   Risks and benefits were discussed, proceed with procedure a

## 2021-10-08 ENCOUNTER — TELEPHONE (OUTPATIENT)
Dept: CARDIOLOGY | Age: 56
End: 2021-10-08

## 2021-10-11 ENCOUNTER — OFFICE VISIT (OUTPATIENT)
Dept: CARDIOLOGY | Age: 56
End: 2021-10-11

## 2021-10-11 VITALS
SYSTOLIC BLOOD PRESSURE: 132 MMHG | HEART RATE: 67 BPM | DIASTOLIC BLOOD PRESSURE: 72 MMHG | BODY MASS INDEX: 30.12 KG/M2 | HEIGHT: 63 IN | WEIGHT: 170 LBS

## 2021-10-11 DIAGNOSIS — I25.10 CORONARY ARTERY DISEASE INVOLVING NATIVE CORONARY ARTERY OF NATIVE HEART WITHOUT ANGINA PECTORIS: ICD-10-CM

## 2021-10-11 DIAGNOSIS — E78.5 DYSLIPIDEMIA: ICD-10-CM

## 2021-10-11 DIAGNOSIS — I21.11 ST ELEVATION MYOCARDIAL INFARCTION INVOLVING RIGHT CORONARY ARTERY (CMD): Primary | ICD-10-CM

## 2021-10-11 DIAGNOSIS — Z79.4 TYPE 2 DIABETES MELLITUS WITH OTHER CIRCULATORY COMPLICATION, WITH LONG-TERM CURRENT USE OF INSULIN (CMD): ICD-10-CM

## 2021-10-11 DIAGNOSIS — I10 ESSENTIAL HYPERTENSION: ICD-10-CM

## 2021-10-11 DIAGNOSIS — Z95.5 PRESENCE OF CORONARY ANGIOPLASTY IMPLANT AND GRAFT: ICD-10-CM

## 2021-10-11 DIAGNOSIS — E11.59 TYPE 2 DIABETES MELLITUS WITH OTHER CIRCULATORY COMPLICATION, WITH LONG-TERM CURRENT USE OF INSULIN (CMD): ICD-10-CM

## 2021-10-11 DIAGNOSIS — I24.9 ACUTE CORONARY SYNDROME (CMD): ICD-10-CM

## 2021-10-11 DIAGNOSIS — Z95.1 HX OF CABG: ICD-10-CM

## 2021-10-11 DIAGNOSIS — E78.1 HYPERTRIGLYCERIDEMIA: ICD-10-CM

## 2021-10-11 PROCEDURE — 99202 OFFICE O/P NEW SF 15 MIN: CPT | Performed by: INTERNAL MEDICINE

## 2021-10-11 PROCEDURE — 3078F DIAST BP <80 MM HG: CPT | Performed by: INTERNAL MEDICINE

## 2021-10-11 PROCEDURE — 3075F SYST BP GE 130 - 139MM HG: CPT | Performed by: INTERNAL MEDICINE

## 2021-10-11 RX ORDER — ICOSAPENT ETHYL 1000 MG/1
2 CAPSULE ORAL 2 TIMES DAILY
Qty: 360 CAPSULE | Refills: 3 | Status: SHIPPED | OUTPATIENT
Start: 2021-10-11

## 2021-10-11 RX ORDER — PANTOPRAZOLE SODIUM 40 MG/1
40 TABLET, DELAYED RELEASE ORAL DAILY
COMMUNITY
Start: 2021-09-27

## 2021-10-11 SDOH — HEALTH STABILITY: PHYSICAL HEALTH: ON AVERAGE, HOW MANY MINUTES DO YOU ENGAGE IN EXERCISE AT THIS LEVEL?: 20 MIN

## 2021-10-11 SDOH — HEALTH STABILITY: PHYSICAL HEALTH: ON AVERAGE, HOW MANY DAYS PER WEEK DO YOU ENGAGE IN MODERATE TO STRENUOUS EXERCISE (LIKE A BRISK WALK)?: 7 DAYS

## 2021-10-11 ASSESSMENT — PATIENT HEALTH QUESTIONNAIRE - PHQ9
SUM OF ALL RESPONSES TO PHQ9 QUESTIONS 1 AND 2: 0
1. LITTLE INTEREST OR PLEASURE IN DOING THINGS: NOT AT ALL
2. FEELING DOWN, DEPRESSED OR HOPELESS: NOT AT ALL
SUM OF ALL RESPONSES TO PHQ9 QUESTIONS 1 AND 2: 0
CLINICAL INTERPRETATION OF PHQ9 SCORE: NO FURTHER SCREENING NEEDED
CLINICAL INTERPRETATION OF PHQ2 SCORE: NO FURTHER SCREENING NEEDED

## 2021-10-11 ASSESSMENT — ENCOUNTER SYMPTOMS
HEMATOCHEZIA: 0
WEIGHT GAIN: 0
COUGH: 0
ALLERGIC/IMMUNOLOGIC COMMENTS: NO NEW FOOD ALLERGIES
SUSPICIOUS LESIONS: 0
HEMOPTYSIS: 0
FEVER: 0
CHILLS: 0
BRUISES/BLEEDS EASILY: 0
WEIGHT LOSS: 0

## 2021-10-15 ENCOUNTER — LAB ENCOUNTER (OUTPATIENT)
Dept: LAB | Age: 56
End: 2021-10-15
Attending: INTERNAL MEDICINE
Payer: COMMERCIAL

## 2021-10-15 ENCOUNTER — HOSPITAL ENCOUNTER (OUTPATIENT)
Dept: GENERAL RADIOLOGY | Age: 56
Discharge: HOME | End: 2021-10-15
Attending: INTERNAL MEDICINE
Payer: COMMERCIAL

## 2021-10-15 ENCOUNTER — NURSE ONLY (OUTPATIENT)
Dept: LAB | Age: 56
End: 2021-10-15
Attending: INTERNAL MEDICINE
Payer: COMMERCIAL

## 2021-10-15 DIAGNOSIS — Z01.818 PRE-OP TESTING: ICD-10-CM

## 2021-10-15 DIAGNOSIS — Z01.818 PRE-OPERATIVE EXAMINATION: Primary | ICD-10-CM

## 2021-10-15 PROCEDURE — 80048 BASIC METABOLIC PNL TOTAL CA: CPT

## 2021-10-15 PROCEDURE — 93010 ELECTROCARDIOGRAM REPORT: CPT | Performed by: INTERNAL MEDICINE

## 2021-10-15 PROCEDURE — 93005 ELECTROCARDIOGRAM TRACING: CPT

## 2021-10-15 PROCEDURE — 71046 X-RAY EXAM CHEST 2 VIEWS: CPT | Performed by: INTERNAL MEDICINE

## 2021-10-15 PROCEDURE — 36415 COLL VENOUS BLD VENIPUNCTURE: CPT

## 2021-10-15 PROCEDURE — 85027 COMPLETE CBC AUTOMATED: CPT

## 2021-10-18 ENCOUNTER — HOSPITAL ENCOUNTER (OUTPATIENT)
Dept: INTERVENTIONAL RADIOLOGY/VASCULAR | Facility: HOSPITAL | Age: 56
Discharge: HOME | End: 2021-10-18
Attending: INTERNAL MEDICINE | Admitting: INTERNAL MEDICINE
Payer: COMMERCIAL

## 2021-10-18 VITALS
OXYGEN SATURATION: 99 % | WEIGHT: 169 LBS | SYSTOLIC BLOOD PRESSURE: 143 MMHG | TEMPERATURE: 98 F | RESPIRATION RATE: 17 BRPM | DIASTOLIC BLOOD PRESSURE: 73 MMHG | HEART RATE: 68 BPM | HEIGHT: 63 IN | BODY MASS INDEX: 29.95 KG/M2

## 2021-10-18 DIAGNOSIS — T82.857A: ICD-10-CM

## 2021-10-18 DIAGNOSIS — I25.10 CAD (CORONARY ARTERY DISEASE): ICD-10-CM

## 2021-10-18 DIAGNOSIS — Z01.818 PRE-OP TESTING: Primary | ICD-10-CM

## 2021-10-18 DIAGNOSIS — E78.5 HLD (HYPERLIPIDEMIA): ICD-10-CM

## 2021-10-18 DIAGNOSIS — E11.9 DM TYPE 2 (DIABETES MELLITUS, TYPE 2) (HCC): ICD-10-CM

## 2021-10-18 PROCEDURE — 02703ZZ DILATION OF CORONARY ARTERY, ONE ARTERY, PERCUTANEOUS APPROACH: ICD-10-PCS | Performed by: INTERNAL MEDICINE

## 2021-10-18 PROCEDURE — 99152 MOD SED SAME PHYS/QHP 5/>YRS: CPT

## 2021-10-18 PROCEDURE — 36415 COLL VENOUS BLD VENIPUNCTURE: CPT

## 2021-10-18 PROCEDURE — 92920 PRQ TRLUML C ANGIOP 1ART&/BR: CPT

## 2021-10-18 PROCEDURE — B2131ZZ FLUOROSCOPY OF MULTIPLE CORONARY ARTERY BYPASS GRAFTS USING LOW OSMOLAR CONTRAST: ICD-10-PCS | Performed by: INTERNAL MEDICINE

## 2021-10-18 PROCEDURE — 93455 CORONARY ART/GRFT ANGIO S&I: CPT

## 2021-10-18 PROCEDURE — B2181ZZ FLUOROSCOPY OF LEFT INTERNAL MAMMARY BYPASS GRAFT USING LOW OSMOLAR CONTRAST: ICD-10-PCS | Performed by: INTERNAL MEDICINE

## 2021-10-18 PROCEDURE — 85347 COAGULATION TIME ACTIVATED: CPT

## 2021-10-18 PROCEDURE — 99153 MOD SED SAME PHYS/QHP EA: CPT

## 2021-10-18 PROCEDURE — 82962 GLUCOSE BLOOD TEST: CPT

## 2021-10-18 RX ORDER — ASPIRIN 81 MG/1
81 TABLET ORAL DAILY
Status: DISCONTINUED | OUTPATIENT
Start: 2021-10-19 | End: 2021-10-18

## 2021-10-18 RX ORDER — LIDOCAINE HYDROCHLORIDE 20 MG/ML
INJECTION, SOLUTION EPIDURAL; INFILTRATION; INTRACAUDAL; PERINEURAL
Status: COMPLETED
Start: 2021-10-18 | End: 2021-10-18

## 2021-10-18 RX ORDER — CLOPIDOGREL BISULFATE 75 MG/1
75 TABLET ORAL DAILY
Status: DISCONTINUED | OUTPATIENT
Start: 2021-10-19 | End: 2021-10-18

## 2021-10-18 RX ORDER — SODIUM CHLORIDE 9 MG/ML
INJECTION, SOLUTION INTRAVENOUS CONTINUOUS
Status: ACTIVE | OUTPATIENT
Start: 2021-10-18 | End: 2021-10-18

## 2021-10-18 RX ORDER — SODIUM CHLORIDE 9 MG/ML
INJECTION, SOLUTION INTRAVENOUS
Status: DISCONTINUED | OUTPATIENT
Start: 2021-10-18 | End: 2021-10-18

## 2021-10-18 RX ORDER — MIDAZOLAM HYDROCHLORIDE 1 MG/ML
INJECTION INTRAMUSCULAR; INTRAVENOUS
Status: COMPLETED
Start: 2021-10-18 | End: 2021-10-18

## 2021-10-18 RX ORDER — HEPARIN SODIUM 1000 [USP'U]/ML
INJECTION, SOLUTION INTRAVENOUS; SUBCUTANEOUS
Status: COMPLETED
Start: 2021-10-18 | End: 2021-10-18

## 2021-10-18 NOTE — DIETARY NOTE
NUTRITION EDUCATION NOTE    Received consult for nutrition education per cardiac rehab order set. Appropriate education and handout(s) provided. See education section of Epic for specifics.     Maribel Branham RDN, LDN  Clinical Nutrition  Ext [phone]

## 2021-10-18 NOTE — IVS NOTE
- Arterial sheath pulled at 1122 - Manual pressure applied for 20 minutes. Patient tolerated well, vital signs stable, pedal and posterior tibial pulses present. Incision site soft with no signs of hematoma or bleeding.   Mild tenderness to touch

## 2021-10-18 NOTE — IVS NOTE
Patient awake and alert x 4  VSS, SIMIN, adam procedure p,n,v  Right groin soft, non tender, no hematoma  Dressing dry, intact  Instructions given to patient and his spouse, they stated understanding  Patient able to eat, drink, urinate and ambulate before

## 2021-10-18 NOTE — PROCEDURES
[facility]    [facility] Cardiac Cath Procedure Note  Shae Adams Patient Status:  Outpatient in a Bed    1965 MRN [id number]   Location [facility] Attending Humberto Marlow, [address] [facility] Day # 0 PCP Thor Jackson collected  Estimated blood loss: 20 cc  Closure: Manual  Recommendations: Balloon angioplasty of left main with residual 70 to 80% stenosis    Lesion #1 left main  Lesion Characteristics-moderate torturous, minimal calcified. Type non-C lesion.   Pre-inter

## 2022-01-01 ENCOUNTER — APPOINTMENT (OUTPATIENT)
Dept: ULTRASOUND IMAGING | Facility: HOSPITAL | Age: 57
DRG: 266 | End: 2022-01-01
Attending: INTERNAL MEDICINE
Payer: MEDICAID

## 2022-01-01 ENCOUNTER — APPOINTMENT (OUTPATIENT)
Dept: GENERAL RADIOLOGY | Facility: HOSPITAL | Age: 57
DRG: 266 | End: 2022-01-01
Attending: INTERNAL MEDICINE
Payer: MEDICAID

## 2022-01-01 ENCOUNTER — APPOINTMENT (OUTPATIENT)
Dept: CV DIAGNOSTICS | Facility: HOSPITAL | Age: 57
DRG: 266 | End: 2022-01-01
Attending: NURSE PRACTITIONER
Payer: MEDICAID

## 2022-01-01 ENCOUNTER — APPOINTMENT (OUTPATIENT)
Dept: CV DIAGNOSTICS | Facility: HOSPITAL | Age: 57
DRG: 266 | End: 2022-01-01
Attending: INTERNAL MEDICINE
Payer: MEDICAID

## 2022-01-01 ENCOUNTER — ANESTHESIA EVENT (OUTPATIENT)
Dept: INTERVENTIONAL RADIOLOGY/VASCULAR | Facility: HOSPITAL | Age: 57
DRG: 266 | End: 2022-01-01
Payer: MEDICAID

## 2022-01-01 ENCOUNTER — LAB ENCOUNTER (OUTPATIENT)
Dept: LAB | Facility: HOSPITAL | Age: 57
End: 2022-01-01
Attending: INTERNAL MEDICINE
Payer: COMMERCIAL

## 2022-01-01 ENCOUNTER — HOSPITAL ENCOUNTER (INPATIENT)
Dept: INTERVENTIONAL RADIOLOGY/VASCULAR | Facility: HOSPITAL | Age: 57
LOS: 15 days | DRG: 266 | End: 2022-01-01
Attending: INTERNAL MEDICINE | Admitting: INTERNAL MEDICINE
Payer: MEDICAID

## 2022-01-01 ENCOUNTER — APPOINTMENT (OUTPATIENT)
Dept: INTERVENTIONAL RADIOLOGY/VASCULAR | Facility: HOSPITAL | Age: 57
DRG: 266 | End: 2022-01-01
Attending: NURSE PRACTITIONER
Payer: MEDICAID

## 2022-01-01 ENCOUNTER — ANESTHESIA (OUTPATIENT)
Dept: MEDSURG UNIT | Facility: HOSPITAL | Age: 57
DRG: 266 | End: 2022-01-01
Payer: MEDICAID

## 2022-01-01 ENCOUNTER — APPOINTMENT (OUTPATIENT)
Dept: INTERVENTIONAL RADIOLOGY/VASCULAR | Facility: HOSPITAL | Age: 57
DRG: 266 | End: 2022-01-01
Attending: ANESTHESIOLOGY
Payer: MEDICAID

## 2022-01-01 ENCOUNTER — ANESTHESIA EVENT (OUTPATIENT)
Dept: MEDSURG UNIT | Facility: HOSPITAL | Age: 57
DRG: 266 | End: 2022-01-01
Payer: MEDICAID

## 2022-01-01 VITALS
RESPIRATION RATE: 25 BRPM | DIASTOLIC BLOOD PRESSURE: 63 MMHG | SYSTOLIC BLOOD PRESSURE: 105 MMHG | OXYGEN SATURATION: 94 % | HEIGHT: 63 IN | HEART RATE: 52 BPM | BODY MASS INDEX: 32.15 KG/M2 | WEIGHT: 181.44 LBS | TEMPERATURE: 97 F

## 2022-01-01 DIAGNOSIS — I25.10 CAD (CORONARY ARTERY DISEASE): ICD-10-CM

## 2022-01-01 DIAGNOSIS — Z11.59 ENCOUNTER FOR SCREENING FOR OTHER VIRAL DISEASES: ICD-10-CM

## 2022-01-01 DIAGNOSIS — I47.2 VENTRICULAR TACHYCARDIA (HCC): Primary | ICD-10-CM

## 2022-01-01 DIAGNOSIS — I46.9 CARDIAC ARREST (HCC): ICD-10-CM

## 2022-01-01 DIAGNOSIS — I50.21 ACUTE SYSTOLIC HEART FAILURE (HCC): ICD-10-CM

## 2022-01-01 DIAGNOSIS — I20.9 ANGINA PECTORIS (HCC): ICD-10-CM

## 2022-01-01 DIAGNOSIS — Z01.818 PREPROCEDURAL EXAMINATION: ICD-10-CM

## 2022-01-01 DIAGNOSIS — I25.5 ISCHEMIC CARDIOMYOPATHY: ICD-10-CM

## 2022-01-01 LAB
ADENOVIRUS PCR:: NOT DETECTED
ALBUMIN SERPL-MCNC: 1.6 G/DL (ref 3.4–5)
ALBUMIN SERPL-MCNC: 1.8 G/DL (ref 3.4–5)
ALBUMIN SERPL-MCNC: 1.8 G/DL (ref 3.4–5)
ALBUMIN SERPL-MCNC: 2 G/DL (ref 3.4–5)
ALBUMIN SERPL-MCNC: 2.3 G/DL (ref 3.4–5)
ALBUMIN/GLOB SERPL: 0.3 {RATIO} (ref 1–2)
ALBUMIN/GLOB SERPL: 0.3 {RATIO} (ref 1–2)
ALBUMIN/GLOB SERPL: 0.4 {RATIO} (ref 1–2)
ALBUMIN/GLOB SERPL: 0.4 {RATIO} (ref 1–2)
ALBUMIN/GLOB SERPL: 0.5 {RATIO} (ref 1–2)
ALP LIVER SERPL-CCNC: 103 U/L
ALP LIVER SERPL-CCNC: 72 U/L
ALP LIVER SERPL-CCNC: 85 U/L
ALP LIVER SERPL-CCNC: 87 U/L
ALP LIVER SERPL-CCNC: 91 U/L
ALP LIVER SERPL-CCNC: 92 U/L
ALP LIVER SERPL-CCNC: 99 U/L
ALT SERPL-CCNC: 1430 U/L
ALT SERPL-CCNC: 1748 U/L
ALT SERPL-CCNC: 2072 U/L
ALT SERPL-CCNC: 3100 U/L
ALT SERPL-CCNC: 444 U/L
ALT SERPL-CCNC: 639 U/L
ALT SERPL-CCNC: 931 U/L
ANION GAP SERPL CALC-SCNC: 10 MMOL/L (ref 0–18)
ANION GAP SERPL CALC-SCNC: 11 MMOL/L (ref 0–18)
ANION GAP SERPL CALC-SCNC: 11 MMOL/L (ref 0–18)
ANION GAP SERPL CALC-SCNC: 12 MMOL/L (ref 0–18)
ANION GAP SERPL CALC-SCNC: 13 MMOL/L (ref 0–18)
ANION GAP SERPL CALC-SCNC: 14 MMOL/L (ref 0–18)
ANION GAP SERPL CALC-SCNC: 17 MMOL/L (ref 0–18)
ANION GAP SERPL CALC-SCNC: 18 MMOL/L (ref 0–18)
ANION GAP SERPL CALC-SCNC: 28 MMOL/L (ref 0–18)
ANION GAP SERPL CALC-SCNC: 6 MMOL/L (ref 0–18)
ANION GAP SERPL CALC-SCNC: 8 MMOL/L (ref 0–18)
ANION GAP SERPL CALC-SCNC: 8 MMOL/L (ref 0–18)
ANION GAP SERPL CALC-SCNC: 9 MMOL/L (ref 0–18)
ANTIBODY SCREEN: NEGATIVE
APTT PPP: 104.6 SECONDS (ref 23.3–35.6)
APTT PPP: 114.6 SECONDS (ref 23.3–35.6)
APTT PPP: 201.9 SECONDS (ref 23.3–35.6)
APTT PPP: 237.4 SECONDS (ref 23.3–35.6)
APTT PPP: 35.4 SECONDS (ref 23.3–35.6)
APTT PPP: 41.3 SECONDS (ref 23.3–35.6)
APTT PPP: 47 SECONDS (ref 23.3–35.6)
APTT PPP: 53.5 SECONDS (ref 23.3–35.6)
APTT PPP: 53.9 SECONDS (ref 23.3–35.6)
APTT PPP: 54.3 SECONDS (ref 23.3–35.6)
APTT PPP: 55.1 SECONDS (ref 23.3–35.6)
APTT PPP: 55.8 SECONDS (ref 23.3–35.6)
APTT PPP: 55.8 SECONDS (ref 23.3–35.6)
APTT PPP: 59.1 SECONDS (ref 23.3–35.6)
APTT PPP: 61.5 SECONDS (ref 23.3–35.6)
APTT PPP: 64.7 SECONDS (ref 23.3–35.6)
APTT PPP: 73.8 SECONDS (ref 23.3–35.6)
APTT PPP: 88.5 SECONDS (ref 23.3–35.6)
APTT PPP: 90.6 SECONDS (ref 23.3–35.6)
ARTERIAL PATENCY WRIST A: POSITIVE
AST SERPL-CCNC: 131 U/L (ref 15–37)
AST SERPL-CCNC: 1442 U/L (ref 15–37)
AST SERPL-CCNC: 1604 U/L (ref 15–37)
AST SERPL-CCNC: 273 U/L (ref 15–37)
AST SERPL-CCNC: 4161 U/L (ref 15–37)
AST SERPL-CCNC: 676 U/L (ref 15–37)
AST SERPL-CCNC: 81 U/L (ref 15–37)
ATRIAL RATE: 126 BPM
ATRIAL RATE: 43 BPM
ATRIAL RATE: 58 BPM
ATRIAL RATE: 84 BPM
ATRIAL RATE: 87 BPM
ATRIAL RATE: 99 BPM
B PARAPERT DNA SPEC QL NAA+PROBE: NOT DETECTED
B PERT DNA SPEC QL NAA+PROBE: NOT DETECTED
BASE EXCESS BLD CALC-SCNC: 1 MMOL/L
BASE EXCESS BLD CALC-SCNC: 3 MMOL/L
BASE EXCESS BLD CALC-SCNC: 4 MMOL/L
BASE EXCESS BLDA CALC-SCNC: -14.2 MMOL/L (ref ?–2)
BASE EXCESS BLDA CALC-SCNC: -14.7 MMOL/L (ref ?–2)
BASE EXCESS BLDA CALC-SCNC: -19.9 MMOL/L (ref ?–2)
BASE EXCESS BLDA CALC-SCNC: -4.2 MMOL/L (ref ?–2)
BASE EXCESS BLDA CALC-SCNC: -8.4 MMOL/L (ref ?–2)
BASE EXCESS BLDA CALC-SCNC: 0.7 MMOL/L (ref ?–2)
BASE EXCESS BLDA CALC-SCNC: 0.9 MMOL/L (ref ?–2)
BASE EXCESS BLDA CALC-SCNC: 1 MMOL/L (ref ?–2)
BASE EXCESS BLDA CALC-SCNC: 3.4 MMOL/L (ref ?–2)
BASE EXCESS BLDA CALC-SCNC: 3.4 MMOL/L (ref ?–2)
BASE EXCESS BLDA CALC-SCNC: 3.8 MMOL/L (ref ?–2)
BASE EXCESS BLDA CALC-SCNC: 4.2 MMOL/L (ref ?–2)
BASE EXCESS BLDA CALC-SCNC: 5.6 MMOL/L (ref ?–2)
BASOPHILS # BLD AUTO: 0.02 X10(3) UL (ref 0–0.2)
BASOPHILS # BLD AUTO: 0.03 X10(3) UL (ref 0–0.2)
BASOPHILS # BLD AUTO: 0.04 X10(3) UL (ref 0–0.2)
BASOPHILS # BLD AUTO: 0.04 X10(3) UL (ref 0–0.2)
BASOPHILS # BLD AUTO: 0.05 X10(3) UL (ref 0–0.2)
BASOPHILS # BLD AUTO: 0.07 X10(3) UL (ref 0–0.2)
BASOPHILS # BLD AUTO: 0.07 X10(3) UL (ref 0–0.2)
BASOPHILS # BLD AUTO: 0.09 X10(3) UL (ref 0–0.2)
BASOPHILS # BLD: 0 X10(3) UL (ref 0–0.2)
BASOPHILS # BLD: 0 X10(3) UL (ref 0–0.2)
BASOPHILS NFR BLD AUTO: 0.2 %
BASOPHILS NFR BLD AUTO: 0.3 %
BASOPHILS NFR BLD AUTO: 0.3 %
BASOPHILS NFR BLD AUTO: 0.4 %
BASOPHILS NFR BLD AUTO: 0.4 %
BASOPHILS NFR BLD AUTO: 0.5 %
BASOPHILS NFR BLD AUTO: 0.6 %
BASOPHILS NFR BLD AUTO: 0.7 %
BASOPHILS NFR BLD: 0 %
BASOPHILS NFR BLD: 0 %
BILIRUB SERPL-MCNC: 1 MG/DL (ref 0.1–2)
BILIRUB SERPL-MCNC: 1.1 MG/DL (ref 0.1–2)
BILIRUB SERPL-MCNC: 1.4 MG/DL (ref 0.1–2)
BILIRUB SERPL-MCNC: 1.9 MG/DL (ref 0.1–2)
BILIRUB SERPL-MCNC: 2 MG/DL (ref 0.1–2)
BILIRUB SERPL-MCNC: 2 MG/DL (ref 0.1–2)
BILIRUB SERPL-MCNC: 2.5 MG/DL (ref 0.1–2)
BILIRUB UR QL STRIP.AUTO: NEGATIVE
BLOOD TYPE BARCODE: 6200
BODY TEMPERATURE: 98 F
BODY TEMPERATURE: 98.6 F
BUN BLD-MCNC: 102 MG/DL (ref 7–18)
BUN BLD-MCNC: 114 MG/DL (ref 7–18)
BUN BLD-MCNC: 121 MG/DL (ref 7–18)
BUN BLD-MCNC: 122 MG/DL (ref 7–18)
BUN BLD-MCNC: 125 MG/DL (ref 7–18)
BUN BLD-MCNC: 13 MG/DL (ref 7–18)
BUN BLD-MCNC: 130 MG/DL (ref 7–18)
BUN BLD-MCNC: 16 MG/DL (ref 7–18)
BUN BLD-MCNC: 24 MG/DL (ref 7–18)
BUN BLD-MCNC: 43 MG/DL (ref 7–18)
BUN BLD-MCNC: 49 MG/DL (ref 7–18)
BUN BLD-MCNC: 50 MG/DL (ref 7–18)
BUN BLD-MCNC: 61 MG/DL (ref 7–18)
BUN BLD-MCNC: 65 MG/DL (ref 7–18)
BUN BLD-MCNC: 81 MG/DL (ref 7–18)
BUN BLD-MCNC: 88 MG/DL (ref 7–18)
BUN BLD-MCNC: 91 MG/DL (ref 7–18)
BUN BLD-MCNC: 94 MG/DL (ref 7–18)
C PNEUM DNA SPEC QL NAA+PROBE: NOT DETECTED
CA-I BLD-SCNC: 0.41 MMOL/L (ref 0.95–1.32)
CA-I BLD-SCNC: 0.93 MMOL/L (ref 1.12–1.32)
CA-I BLD-SCNC: 0.99 MMOL/L (ref 0.95–1.32)
CA-I BLD-SCNC: 1 MMOL/L (ref 0.95–1.32)
CA-I BLD-SCNC: 1.01 MMOL/L (ref 1.12–1.32)
CA-I BLD-SCNC: 1.02 MMOL/L (ref 0.95–1.32)
CA-I BLD-SCNC: 1.03 MMOL/L (ref 0.95–1.32)
CA-I BLD-SCNC: 1.04 MMOL/L (ref 0.95–1.32)
CA-I BLD-SCNC: 1.08 MMOL/L (ref 1.12–1.32)
CA-I BLD-SCNC: 1.13 MMOL/L (ref 0.95–1.32)
CA-I BLD-SCNC: 1.14 MMOL/L (ref 0.95–1.32)
CA-I BLD-SCNC: 1.16 MMOL/L (ref 0.95–1.32)
CALCIUM BLD-MCNC: 6.6 MG/DL (ref 8.5–10.1)
CALCIUM BLD-MCNC: 7.7 MG/DL (ref 8.5–10.1)
CALCIUM BLD-MCNC: 7.8 MG/DL (ref 8.5–10.1)
CALCIUM BLD-MCNC: 8 MG/DL (ref 8.5–10.1)
CALCIUM BLD-MCNC: 8.3 MG/DL (ref 8.5–10.1)
CALCIUM BLD-MCNC: 8.6 MG/DL (ref 8.5–10.1)
CALCIUM BLD-MCNC: 8.8 MG/DL (ref 8.5–10.1)
CALCIUM BLD-MCNC: 8.8 MG/DL (ref 8.5–10.1)
CALCIUM BLD-MCNC: 8.9 MG/DL (ref 8.5–10.1)
CALCIUM BLD-MCNC: 8.9 MG/DL (ref 8.5–10.1)
CALCIUM BLD-MCNC: 9 MG/DL (ref 8.5–10.1)
CALCIUM BLD-MCNC: 9.2 MG/DL (ref 8.5–10.1)
CALCIUM BLD-MCNC: 9.2 MG/DL (ref 8.5–10.1)
CALCIUM BLD-MCNC: 9.3 MG/DL (ref 8.5–10.1)
CHLORIDE SERPL-SCNC: 101 MMOL/L (ref 98–112)
CHLORIDE SERPL-SCNC: 102 MMOL/L (ref 98–112)
CHLORIDE SERPL-SCNC: 102 MMOL/L (ref 98–112)
CHLORIDE SERPL-SCNC: 103 MMOL/L (ref 98–112)
CHLORIDE SERPL-SCNC: 104 MMOL/L (ref 98–112)
CHLORIDE SERPL-SCNC: 104 MMOL/L (ref 98–112)
CHLORIDE SERPL-SCNC: 105 MMOL/L (ref 98–112)
CHLORIDE SERPL-SCNC: 105 MMOL/L (ref 98–112)
CHLORIDE SERPL-SCNC: 106 MMOL/L (ref 98–112)
CHLORIDE SERPL-SCNC: 107 MMOL/L (ref 98–112)
CHLORIDE SERPL-SCNC: 109 MMOL/L (ref 98–112)
CHLORIDE SERPL-SCNC: 110 MMOL/L (ref 98–112)
CHLORIDE SERPL-SCNC: 113 MMOL/L (ref 98–112)
CHLORIDE SERPL-SCNC: 99 MMOL/L (ref 98–112)
CLARITY UR REFRACT.AUTO: CLEAR
CO2 BLD-SCNC: 28 MMOL/L (ref 22–32)
CO2 BLD-SCNC: 30 MMOL/L (ref 22–32)
CO2 BLD-SCNC: 30 MMOL/L (ref 22–32)
CO2 SERPL-SCNC: 11 MMOL/L (ref 21–32)
CO2 SERPL-SCNC: 15 MMOL/L (ref 21–32)
CO2 SERPL-SCNC: 16 MMOL/L (ref 21–32)
CO2 SERPL-SCNC: 19 MMOL/L (ref 21–32)
CO2 SERPL-SCNC: 22 MMOL/L (ref 21–32)
CO2 SERPL-SCNC: 22 MMOL/L (ref 21–32)
CO2 SERPL-SCNC: 23 MMOL/L (ref 21–32)
CO2 SERPL-SCNC: 23 MMOL/L (ref 21–32)
CO2 SERPL-SCNC: 24 MMOL/L (ref 21–32)
CO2 SERPL-SCNC: 24 MMOL/L (ref 21–32)
CO2 SERPL-SCNC: 25 MMOL/L (ref 21–32)
CO2 SERPL-SCNC: 26 MMOL/L (ref 21–32)
CO2 SERPL-SCNC: 27 MMOL/L (ref 21–32)
CO2 SERPL-SCNC: 28 MMOL/L (ref 21–32)
COHGB MFR BLD: 0.9 % SAT (ref 0–3)
COHGB MFR BLD: 1 % SAT (ref 0–3)
COHGB MFR BLD: 1.2 % SAT (ref 0–3)
COHGB MFR BLD: 1.2 % SAT (ref 0–3)
COHGB MFR BLD: 1.3 % SAT (ref 0–3)
COHGB MFR BLD: 1.4 % SAT (ref 0–3)
COHGB MFR BLD: 1.5 % SAT (ref 0–3)
COHGB MFR BLD: 1.5 % SAT (ref 0–3)
COHGB MFR BLD: 1.8 % SAT (ref 0–3)
COHGB MFR BLD: 2.2 % SAT (ref 0–3)
COHGB MFR BLD: 2.8 % SAT (ref 0–3)
COLOR UR AUTO: YELLOW
CORONAVIRUS 229E PCR:: NOT DETECTED
CORONAVIRUS HKU1 PCR:: NOT DETECTED
CORONAVIRUS NL63 PCR:: NOT DETECTED
CORONAVIRUS OC43 PCR:: NOT DETECTED
CREAT BLD-MCNC: 0.9 MG/DL
CREAT BLD-MCNC: 1.06 MG/DL
CREAT BLD-MCNC: 1.13 MG/DL
CREAT BLD-MCNC: 1.49 MG/DL
CREAT BLD-MCNC: 1.57 MG/DL
CREAT BLD-MCNC: 1.57 MG/DL
CREAT BLD-MCNC: 1.84 MG/DL
CREAT BLD-MCNC: 2.05 MG/DL
CREAT BLD-MCNC: 2.74 MG/DL
CREAT BLD-MCNC: 3.07 MG/DL
CREAT BLD-MCNC: 3.38 MG/DL
CREAT BLD-MCNC: 3.5 MG/DL
CREAT BLD-MCNC: 3.69 MG/DL
CREAT BLD-MCNC: 3.98 MG/DL
CREAT BLD-MCNC: 4.28 MG/DL
CREAT BLD-MCNC: 4.54 MG/DL
CREAT BLD-MCNC: 4.62 MG/DL
CREAT BLD-MCNC: 4.93 MG/DL
EOSINOPHIL # BLD AUTO: 0.01 X10(3) UL (ref 0–0.7)
EOSINOPHIL # BLD AUTO: 0.03 X10(3) UL (ref 0–0.7)
EOSINOPHIL # BLD AUTO: 0.06 X10(3) UL (ref 0–0.7)
EOSINOPHIL # BLD AUTO: 0.09 X10(3) UL (ref 0–0.7)
EOSINOPHIL # BLD AUTO: 0.26 X10(3) UL (ref 0–0.7)
EOSINOPHIL # BLD AUTO: 0.32 X10(3) UL (ref 0–0.7)
EOSINOPHIL # BLD: 0.13 X10(3) UL (ref 0–0.7)
EOSINOPHIL # BLD: 0.96 X10(3) UL (ref 0–0.7)
EOSINOPHIL NFR BLD AUTO: 0.1 %
EOSINOPHIL NFR BLD AUTO: 0.3 %
EOSINOPHIL NFR BLD AUTO: 0.6 %
EOSINOPHIL NFR BLD AUTO: 0.8 %
EOSINOPHIL NFR BLD AUTO: 1.9 %
EOSINOPHIL NFR BLD AUTO: 2.3 %
EOSINOPHIL NFR BLD: 1 %
EOSINOPHIL NFR BLD: 6 %
ERYTHROCYTE [DISTWIDTH] IN BLOOD BY AUTOMATED COUNT: 12.2 %
ERYTHROCYTE [DISTWIDTH] IN BLOOD BY AUTOMATED COUNT: 12.3 %
ERYTHROCYTE [DISTWIDTH] IN BLOOD BY AUTOMATED COUNT: 12.6 %
ERYTHROCYTE [DISTWIDTH] IN BLOOD BY AUTOMATED COUNT: 12.6 %
ERYTHROCYTE [DISTWIDTH] IN BLOOD BY AUTOMATED COUNT: 12.7 %
ERYTHROCYTE [DISTWIDTH] IN BLOOD BY AUTOMATED COUNT: 12.9 %
ERYTHROCYTE [DISTWIDTH] IN BLOOD BY AUTOMATED COUNT: 13 %
ERYTHROCYTE [DISTWIDTH] IN BLOOD BY AUTOMATED COUNT: 13.1 %
ERYTHROCYTE [DISTWIDTH] IN BLOOD BY AUTOMATED COUNT: 13.2 %
ERYTHROCYTE [DISTWIDTH] IN BLOOD BY AUTOMATED COUNT: 13.3 %
ERYTHROCYTE [DISTWIDTH] IN BLOOD BY AUTOMATED COUNT: 13.4 %
ERYTHROCYTE [DISTWIDTH] IN BLOOD BY AUTOMATED COUNT: 14.1 %
ERYTHROCYTE [DISTWIDTH] IN BLOOD BY AUTOMATED COUNT: 15.1 %
ERYTHROCYTE [DISTWIDTH] IN BLOOD BY AUTOMATED COUNT: 17.7 %
ERYTHROCYTE [DISTWIDTH] IN BLOOD BY AUTOMATED COUNT: 18.3 %
FIO2: 100 %
FIO2: 30 %
FIO2: 40 %
FIO2: 50 %
FIO2: 60 %
FLUAV RNA SPEC QL NAA+PROBE: NOT DETECTED
FLUBV RNA SPEC QL NAA+PROBE: NOT DETECTED
GFR SERPLBLD BASED ON 1.73 SQ M-ARVRAT: 13 ML/MIN/1.73M2 (ref 60–?)
GFR SERPLBLD BASED ON 1.73 SQ M-ARVRAT: 14 ML/MIN/1.73M2 (ref 60–?)
GFR SERPLBLD BASED ON 1.73 SQ M-ARVRAT: 14 ML/MIN/1.73M2 (ref 60–?)
GFR SERPLBLD BASED ON 1.73 SQ M-ARVRAT: 15 ML/MIN/1.73M2 (ref 60–?)
GFR SERPLBLD BASED ON 1.73 SQ M-ARVRAT: 17 ML/MIN/1.73M2 (ref 60–?)
GFR SERPLBLD BASED ON 1.73 SQ M-ARVRAT: 18 ML/MIN/1.73M2 (ref 60–?)
GFR SERPLBLD BASED ON 1.73 SQ M-ARVRAT: 20 ML/MIN/1.73M2 (ref 60–?)
GFR SERPLBLD BASED ON 1.73 SQ M-ARVRAT: 20 ML/MIN/1.73M2 (ref 60–?)
GFR SERPLBLD BASED ON 1.73 SQ M-ARVRAT: 23 ML/MIN/1.73M2 (ref 60–?)
GFR SERPLBLD BASED ON 1.73 SQ M-ARVRAT: 26 ML/MIN/1.73M2 (ref 60–?)
GFR SERPLBLD BASED ON 1.73 SQ M-ARVRAT: 42 ML/MIN/1.73M2 (ref 60–?)
GLOBULIN PLAS-MCNC: 3.8 G/DL (ref 2.8–4.4)
GLOBULIN PLAS-MCNC: 3.9 G/DL (ref 2.8–4.4)
GLOBULIN PLAS-MCNC: 4.3 G/DL (ref 2.8–4.4)
GLOBULIN PLAS-MCNC: 4.5 G/DL (ref 2.8–4.4)
GLOBULIN PLAS-MCNC: 4.6 G/DL (ref 2.8–4.4)
GLOBULIN PLAS-MCNC: 4.7 G/DL (ref 2.8–4.4)
GLOBULIN PLAS-MCNC: 4.7 G/DL (ref 2.8–4.4)
GLUCOSE BLD-MCNC: 103 MG/DL (ref 70–99)
GLUCOSE BLD-MCNC: 104 MG/DL (ref 70–99)
GLUCOSE BLD-MCNC: 105 MG/DL (ref 70–99)
GLUCOSE BLD-MCNC: 106 MG/DL (ref 70–99)
GLUCOSE BLD-MCNC: 107 MG/DL (ref 70–99)
GLUCOSE BLD-MCNC: 109 MG/DL (ref 70–99)
GLUCOSE BLD-MCNC: 110 MG/DL (ref 70–99)
GLUCOSE BLD-MCNC: 110 MG/DL (ref 70–99)
GLUCOSE BLD-MCNC: 111 MG/DL (ref 70–99)
GLUCOSE BLD-MCNC: 112 MG/DL (ref 70–99)
GLUCOSE BLD-MCNC: 114 MG/DL (ref 70–99)
GLUCOSE BLD-MCNC: 116 MG/DL (ref 70–99)
GLUCOSE BLD-MCNC: 117 MG/DL (ref 70–99)
GLUCOSE BLD-MCNC: 118 MG/DL (ref 70–99)
GLUCOSE BLD-MCNC: 119 MG/DL (ref 70–99)
GLUCOSE BLD-MCNC: 120 MG/DL (ref 70–99)
GLUCOSE BLD-MCNC: 121 MG/DL (ref 70–99)
GLUCOSE BLD-MCNC: 121 MG/DL (ref 70–99)
GLUCOSE BLD-MCNC: 122 MG/DL (ref 70–99)
GLUCOSE BLD-MCNC: 123 MG/DL (ref 70–99)
GLUCOSE BLD-MCNC: 124 MG/DL (ref 70–99)
GLUCOSE BLD-MCNC: 124 MG/DL (ref 70–99)
GLUCOSE BLD-MCNC: 125 MG/DL (ref 70–99)
GLUCOSE BLD-MCNC: 128 MG/DL (ref 70–99)
GLUCOSE BLD-MCNC: 130 MG/DL (ref 70–99)
GLUCOSE BLD-MCNC: 131 MG/DL (ref 70–99)
GLUCOSE BLD-MCNC: 131 MG/DL (ref 70–99)
GLUCOSE BLD-MCNC: 132 MG/DL (ref 70–99)
GLUCOSE BLD-MCNC: 133 MG/DL (ref 70–99)
GLUCOSE BLD-MCNC: 135 MG/DL (ref 70–99)
GLUCOSE BLD-MCNC: 136 MG/DL (ref 70–99)
GLUCOSE BLD-MCNC: 136 MG/DL (ref 70–99)
GLUCOSE BLD-MCNC: 137 MG/DL (ref 70–99)
GLUCOSE BLD-MCNC: 137 MG/DL (ref 70–99)
GLUCOSE BLD-MCNC: 138 MG/DL (ref 70–99)
GLUCOSE BLD-MCNC: 139 MG/DL (ref 70–99)
GLUCOSE BLD-MCNC: 142 MG/DL (ref 70–99)
GLUCOSE BLD-MCNC: 145 MG/DL (ref 70–99)
GLUCOSE BLD-MCNC: 151 MG/DL (ref 70–99)
GLUCOSE BLD-MCNC: 153 MG/DL (ref 70–99)
GLUCOSE BLD-MCNC: 155 MG/DL (ref 70–99)
GLUCOSE BLD-MCNC: 157 MG/DL (ref 70–99)
GLUCOSE BLD-MCNC: 164 MG/DL (ref 70–99)
GLUCOSE BLD-MCNC: 168 MG/DL (ref 70–99)
GLUCOSE BLD-MCNC: 168 MG/DL (ref 70–99)
GLUCOSE BLD-MCNC: 192 MG/DL (ref 70–99)
GLUCOSE BLD-MCNC: 193 MG/DL (ref 70–99)
GLUCOSE BLD-MCNC: 196 MG/DL (ref 70–99)
GLUCOSE BLD-MCNC: 196 MG/DL (ref 70–99)
GLUCOSE BLD-MCNC: 197 MG/DL (ref 70–99)
GLUCOSE BLD-MCNC: 198 MG/DL (ref 70–99)
GLUCOSE BLD-MCNC: 199 MG/DL (ref 70–99)
GLUCOSE BLD-MCNC: 200 MG/DL (ref 70–99)
GLUCOSE BLD-MCNC: 210 MG/DL (ref 70–99)
GLUCOSE BLD-MCNC: 211 MG/DL (ref 70–99)
GLUCOSE BLD-MCNC: 212 MG/DL (ref 70–99)
GLUCOSE BLD-MCNC: 214 MG/DL (ref 70–99)
GLUCOSE BLD-MCNC: 216 MG/DL (ref 70–99)
GLUCOSE BLD-MCNC: 221 MG/DL (ref 70–99)
GLUCOSE BLD-MCNC: 225 MG/DL (ref 70–99)
GLUCOSE BLD-MCNC: 226 MG/DL (ref 70–99)
GLUCOSE BLD-MCNC: 230 MG/DL (ref 70–99)
GLUCOSE BLD-MCNC: 236 MG/DL (ref 70–99)
GLUCOSE BLD-MCNC: 238 MG/DL (ref 70–99)
GLUCOSE BLD-MCNC: 242 MG/DL (ref 70–99)
GLUCOSE BLD-MCNC: 242 MG/DL (ref 70–99)
GLUCOSE BLD-MCNC: 253 MG/DL (ref 70–99)
GLUCOSE BLD-MCNC: 254 MG/DL (ref 70–99)
GLUCOSE BLD-MCNC: 257 MG/DL (ref 70–99)
GLUCOSE BLD-MCNC: 259 MG/DL (ref 70–99)
GLUCOSE BLD-MCNC: 264 MG/DL (ref 70–99)
GLUCOSE BLD-MCNC: 266 MG/DL (ref 70–99)
GLUCOSE BLD-MCNC: 270 MG/DL (ref 70–99)
GLUCOSE BLD-MCNC: 275 MG/DL (ref 70–99)
GLUCOSE BLD-MCNC: 280 MG/DL (ref 70–99)
GLUCOSE BLD-MCNC: 285 MG/DL (ref 70–99)
GLUCOSE BLD-MCNC: 285 MG/DL (ref 70–99)
GLUCOSE BLD-MCNC: 289 MG/DL (ref 70–99)
GLUCOSE BLD-MCNC: 289 MG/DL (ref 70–99)
GLUCOSE BLD-MCNC: 292 MG/DL (ref 70–99)
GLUCOSE BLD-MCNC: 293 MG/DL (ref 70–99)
GLUCOSE BLD-MCNC: 293 MG/DL (ref 70–99)
GLUCOSE BLD-MCNC: 294 MG/DL (ref 70–99)
GLUCOSE BLD-MCNC: 296 MG/DL (ref 70–99)
GLUCOSE BLD-MCNC: 298 MG/DL (ref 70–99)
GLUCOSE BLD-MCNC: 298 MG/DL (ref 70–99)
GLUCOSE BLD-MCNC: 299 MG/DL (ref 70–99)
GLUCOSE BLD-MCNC: 303 MG/DL (ref 70–99)
GLUCOSE BLD-MCNC: 304 MG/DL (ref 70–99)
GLUCOSE BLD-MCNC: 305 MG/DL (ref 70–99)
GLUCOSE BLD-MCNC: 306 MG/DL (ref 70–99)
GLUCOSE BLD-MCNC: 308 MG/DL (ref 70–99)
GLUCOSE BLD-MCNC: 312 MG/DL (ref 70–99)
GLUCOSE BLD-MCNC: 314 MG/DL (ref 70–99)
GLUCOSE BLD-MCNC: 325 MG/DL (ref 70–99)
GLUCOSE BLD-MCNC: 326 MG/DL (ref 70–99)
GLUCOSE BLD-MCNC: 335 MG/DL (ref 70–99)
GLUCOSE BLD-MCNC: 338 MG/DL (ref 70–99)
GLUCOSE BLD-MCNC: 340 MG/DL (ref 70–99)
GLUCOSE BLD-MCNC: 347 MG/DL (ref 70–99)
GLUCOSE BLD-MCNC: 349 MG/DL (ref 70–99)
GLUCOSE BLD-MCNC: 373 MG/DL (ref 70–99)
GLUCOSE BLD-MCNC: 394 MG/DL (ref 70–99)
GLUCOSE BLD-MCNC: 438 MG/DL (ref 70–99)
GLUCOSE BLD-MCNC: 469 MG/DL (ref 70–99)
GLUCOSE BLD-MCNC: 475 MG/DL (ref 70–99)
GLUCOSE BLD-MCNC: 476 MG/DL (ref 70–99)
GLUCOSE BLD-MCNC: 75 MG/DL (ref 70–99)
GLUCOSE BLD-MCNC: 88 MG/DL (ref 70–99)
GLUCOSE BLD-MCNC: 99 MG/DL (ref 70–99)
GLUCOSE UR STRIP.AUTO-MCNC: 250 MG/DL
HCO3 BLD-SCNC: 26.6 MEQ/L
HCO3 BLD-SCNC: 28.5 MEQ/L
HCO3 BLD-SCNC: 28.9 MEQ/L
HCO3 BLDA-SCNC: 13.5 MEQ/L (ref 21–27)
HCO3 BLDA-SCNC: 13.8 MEQ/L (ref 21–27)
HCO3 BLDA-SCNC: 18.4 MEQ/L (ref 21–27)
HCO3 BLDA-SCNC: 21.6 MEQ/L (ref 21–27)
HCO3 BLDA-SCNC: 25.4 MEQ/L (ref 21–27)
HCO3 BLDA-SCNC: 25.7 MEQ/L (ref 21–27)
HCO3 BLDA-SCNC: 25.7 MEQ/L (ref 21–27)
HCO3 BLDA-SCNC: 27.6 MEQ/L (ref 21–27)
HCO3 BLDA-SCNC: 27.6 MEQ/L (ref 21–27)
HCO3 BLDA-SCNC: 27.9 MEQ/L (ref 21–27)
HCO3 BLDA-SCNC: 28.2 MEQ/L (ref 21–27)
HCO3 BLDA-SCNC: 29.3 MEQ/L (ref 21–27)
HCO3 BLDA-SCNC: 9.4 MEQ/L (ref 21–27)
HCT VFR BLD AUTO: 29.5 %
HCT VFR BLD AUTO: 29.7 %
HCT VFR BLD AUTO: 30.6 %
HCT VFR BLD AUTO: 30.7 %
HCT VFR BLD AUTO: 31 %
HCT VFR BLD AUTO: 31.1 %
HCT VFR BLD AUTO: 32.3 %
HCT VFR BLD AUTO: 33.5 %
HCT VFR BLD AUTO: 35.5 %
HCT VFR BLD AUTO: 38.4 %
HCT VFR BLD AUTO: 38.9 %
HCT VFR BLD AUTO: 39 %
HCT VFR BLD AUTO: 39.6 %
HCT VFR BLD AUTO: 41.1 %
HCT VFR BLD AUTO: 48.4 %
HCT VFR BLD CALC: 32 %
HCT VFR BLD CALC: 33 %
HCT VFR BLD CALC: 33 %
HGB BLD-MCNC: 10 G/DL
HGB BLD-MCNC: 10.1 G/DL
HGB BLD-MCNC: 10.2 G/DL
HGB BLD-MCNC: 10.2 G/DL
HGB BLD-MCNC: 10.3 G/DL
HGB BLD-MCNC: 10.3 G/DL
HGB BLD-MCNC: 10.4 G/DL
HGB BLD-MCNC: 10.5 G/DL
HGB BLD-MCNC: 10.7 G/DL
HGB BLD-MCNC: 10.8 G/DL
HGB BLD-MCNC: 10.8 G/DL
HGB BLD-MCNC: 11 G/DL
HGB BLD-MCNC: 11 G/DL
HGB BLD-MCNC: 11.1 G/DL
HGB BLD-MCNC: 11.1 G/DL
HGB BLD-MCNC: 11.2 G/DL
HGB BLD-MCNC: 11.8 G/DL
HGB BLD-MCNC: 12 G/DL
HGB BLD-MCNC: 12.6 G/DL
HGB BLD-MCNC: 12.9 G/DL
HGB BLD-MCNC: 13 G/DL
HGB BLD-MCNC: 13.2 G/DL
HGB BLD-MCNC: 13.3 G/DL
HGB BLD-MCNC: 13.7 G/DL
HGB BLD-MCNC: 14.9 G/DL
HGB BLD-MCNC: 16.2 G/DL
HGB BLD-MCNC: 16.8 G/DL
HGB BLD-MCNC: 17 G/DL
HYALINE CASTS #/AREA URNS AUTO: PRESENT /LPF
IMM GRANULOCYTES # BLD AUTO: 0.05 X10(3) UL (ref 0–1)
IMM GRANULOCYTES # BLD AUTO: 0.06 X10(3) UL (ref 0–1)
IMM GRANULOCYTES # BLD AUTO: 0.07 X10(3) UL (ref 0–1)
IMM GRANULOCYTES # BLD AUTO: 0.19 X10(3) UL (ref 0–1)
IMM GRANULOCYTES # BLD AUTO: 0.22 X10(3) UL (ref 0–1)
IMM GRANULOCYTES # BLD AUTO: 0.23 X10(3) UL (ref 0–1)
IMM GRANULOCYTES # BLD AUTO: 0.26 X10(3) UL (ref 0–1)
IMM GRANULOCYTES # BLD AUTO: 0.26 X10(3) UL (ref 0–1)
IMM GRANULOCYTES NFR BLD: 0.5 %
IMM GRANULOCYTES NFR BLD: 0.6 %
IMM GRANULOCYTES NFR BLD: 0.7 %
IMM GRANULOCYTES NFR BLD: 1.4 %
IMM GRANULOCYTES NFR BLD: 1.9 %
IMM GRANULOCYTES NFR BLD: 2 %
IMM GRANULOCYTES NFR BLD: 2.1 %
IMM GRANULOCYTES NFR BLD: 2.3 %
INR BLD: 1.59 (ref 0.85–1.16)
ISTAT ACTIVATED CLOTTING TIME: 265 SECONDS (ref 74–137)
ISTAT ACTIVATED CLOTTING TIME: 277 SECONDS (ref 74–137)
ISTAT ACTIVATED CLOTTING TIME: 295 SECONDS (ref 74–137)
ISTAT ACTIVATED CLOTTING TIME: 300 SECONDS (ref 74–137)
ISTAT ACTIVATED CLOTTING TIME: 561 SECONDS (ref 74–137)
KETONES UR STRIP.AUTO-MCNC: NEGATIVE MG/DL
L/M: 4 L/MIN
LACTATE BLD-SCNC: 1.4 MMOL/L (ref 0.5–2)
LACTATE BLD-SCNC: 13.5 MMOL/L (ref 0.5–2)
LACTATE BLD-SCNC: 2.2 MMOL/L (ref 0.5–2)
LACTATE BLD-SCNC: 3.1 MMOL/L (ref 0.5–2)
LACTATE BLD-SCNC: 5 MMOL/L (ref 0.5–2)
LACTATE BLD-SCNC: 6.4 MMOL/L (ref 0.5–2)
LACTATE BLD-SCNC: 7.5 MMOL/L (ref 0.5–2)
LACTATE BLD-SCNC: 8.8 MMOL/L (ref 0.5–2)
LACTATE BLD-SCNC: >16.5 MMOL/L (ref 0.5–2)
LACTATE BLD-SCNC: >16.5 MMOL/L (ref 0.5–2)
LEUKOCYTE ESTERASE UR QL STRIP.AUTO: NEGATIVE
LYMPHOCYTES # BLD AUTO: 0.83 X10(3) UL (ref 1–4)
LYMPHOCYTES # BLD AUTO: 0.93 X10(3) UL (ref 1–4)
LYMPHOCYTES # BLD AUTO: 1.27 X10(3) UL (ref 1–4)
LYMPHOCYTES # BLD AUTO: 1.35 X10(3) UL (ref 1–4)
LYMPHOCYTES # BLD AUTO: 1.4 X10(3) UL (ref 1–4)
LYMPHOCYTES # BLD AUTO: 1.52 X10(3) UL (ref 1–4)
LYMPHOCYTES # BLD AUTO: 1.53 X10(3) UL (ref 1–4)
LYMPHOCYTES # BLD AUTO: 1.59 X10(3) UL (ref 1–4)
LYMPHOCYTES NFR BLD AUTO: 10.6 %
LYMPHOCYTES NFR BLD AUTO: 10.9 %
LYMPHOCYTES NFR BLD AUTO: 11.1 %
LYMPHOCYTES NFR BLD AUTO: 11.7 %
LYMPHOCYTES NFR BLD AUTO: 11.9 %
LYMPHOCYTES NFR BLD AUTO: 13.3 %
LYMPHOCYTES NFR BLD AUTO: 13.4 %
LYMPHOCYTES NFR BLD AUTO: 8.8 %
LYMPHOCYTES NFR BLD: 1.44 X10(3) UL (ref 1–4)
LYMPHOCYTES NFR BLD: 1.76 X10(3) UL (ref 1–4)
LYMPHOCYTES NFR BLD: 14 %
LYMPHOCYTES NFR BLD: 9 %
MAGNESIUM SERPL-MCNC: 2.5 MG/DL (ref 1.6–2.6)
MAGNESIUM SERPL-MCNC: 2.9 MG/DL (ref 1.6–2.6)
MAGNESIUM SERPL-MCNC: 3 MG/DL (ref 1.6–2.6)
MAGNESIUM SERPL-MCNC: 3.4 MG/DL (ref 1.6–2.6)
MAGNESIUM SERPL-MCNC: 3.9 MG/DL (ref 1.6–2.6)
MCH RBC QN AUTO: 31.1 PG (ref 26–34)
MCH RBC QN AUTO: 31.3 PG (ref 26–34)
MCH RBC QN AUTO: 31.5 PG (ref 26–34)
MCH RBC QN AUTO: 31.6 PG (ref 26–34)
MCH RBC QN AUTO: 31.6 PG (ref 26–34)
MCH RBC QN AUTO: 31.7 PG (ref 26–34)
MCH RBC QN AUTO: 31.7 PG (ref 26–34)
MCH RBC QN AUTO: 31.8 PG (ref 26–34)
MCH RBC QN AUTO: 31.9 PG (ref 26–34)
MCH RBC QN AUTO: 31.9 PG (ref 26–34)
MCH RBC QN AUTO: 32 PG (ref 26–34)
MCH RBC QN AUTO: 32 PG (ref 26–34)
MCH RBC QN AUTO: 32.3 PG (ref 26–34)
MCHC RBC AUTO-ENTMCNC: 32.2 G/DL (ref 31–37)
MCHC RBC AUTO-ENTMCNC: 32.8 G/DL (ref 31–37)
MCHC RBC AUTO-ENTMCNC: 33 G/DL (ref 31–37)
MCHC RBC AUTO-ENTMCNC: 33.2 G/DL (ref 31–37)
MCHC RBC AUTO-ENTMCNC: 33.9 G/DL (ref 31–37)
MCHC RBC AUTO-ENTMCNC: 34.1 G/DL (ref 31–37)
MCHC RBC AUTO-ENTMCNC: 34.4 G/DL (ref 31–37)
MCHC RBC AUTO-ENTMCNC: 34.4 G/DL (ref 31–37)
MCHC RBC AUTO-ENTMCNC: 34.6 G/DL (ref 31–37)
MCHC RBC AUTO-ENTMCNC: 35 G/DL (ref 31–37)
MCHC RBC AUTO-ENTMCNC: 35.1 G/DL (ref 31–37)
MCHC RBC AUTO-ENTMCNC: 35.2 G/DL (ref 31–37)
MCHC RBC AUTO-ENTMCNC: 36.3 G/DL (ref 31–37)
MCV RBC AUTO: 89 FL
MCV RBC AUTO: 90.3 FL
MCV RBC AUTO: 91.1 FL
MCV RBC AUTO: 91.4 FL
MCV RBC AUTO: 91.9 FL
MCV RBC AUTO: 92.3 FL
MCV RBC AUTO: 92.3 FL
MCV RBC AUTO: 92.4 FL
MCV RBC AUTO: 93.1 FL
MCV RBC AUTO: 93.7 FL
MCV RBC AUTO: 94.7 FL
MCV RBC AUTO: 95.4 FL
MCV RBC AUTO: 95.6 FL
MCV RBC AUTO: 95.8 FL
MCV RBC AUTO: 98.8 FL
METAPNEUMOVIRUS PCR:: NOT DETECTED
METHGB MFR BLD: 0 % SAT (ref 0.4–1.5)
METHGB MFR BLD: 0.2 % SAT (ref 0.4–1.5)
METHGB MFR BLD: 0.4 % SAT (ref 0.4–1.5)
METHGB MFR BLD: 0.5 % SAT (ref 0.4–1.5)
METHGB MFR BLD: 0.6 % SAT (ref 0.4–1.5)
METHGB MFR BLD: 0.6 % SAT (ref 0.4–1.5)
METHGB MFR BLD: 0.7 % SAT (ref 0.4–1.5)
METHGB MFR BLD: 0.8 % SAT (ref 0.4–1.5)
MONOCYTES # BLD AUTO: 0.66 X10(3) UL (ref 0.1–1)
MONOCYTES # BLD AUTO: 0.68 X10(3) UL (ref 0.1–1)
MONOCYTES # BLD AUTO: 0.71 X10(3) UL (ref 0.1–1)
MONOCYTES # BLD AUTO: 0.73 X10(3) UL (ref 0.1–1)
MONOCYTES # BLD AUTO: 0.79 X10(3) UL (ref 0.1–1)
MONOCYTES # BLD AUTO: 0.79 X10(3) UL (ref 0.1–1)
MONOCYTES # BLD AUTO: 0.81 X10(3) UL (ref 0.1–1)
MONOCYTES # BLD AUTO: 0.82 X10(3) UL (ref 0.1–1)
MONOCYTES # BLD: 0.64 X10(3) UL (ref 0.1–1)
MONOCYTES # BLD: 0.76 X10(3) UL (ref 0.1–1)
MONOCYTES NFR BLD AUTO: 5.8 %
MONOCYTES NFR BLD AUTO: 5.9 %
MONOCYTES NFR BLD AUTO: 6.8 %
MONOCYTES NFR BLD AUTO: 7 %
MONOCYTES NFR BLD AUTO: 7.5 %
MONOCYTES NFR BLD AUTO: 9.2 %
MONOCYTES NFR BLD: 4 %
MONOCYTES NFR BLD: 6 %
MORPHOLOGY: NORMAL
MORPHOLOGY: NORMAL
MYCOPLASMA PNEUMONIA PCR:: NOT DETECTED
NEUTROPHILS # BLD AUTO: 10.43 X10 (3) UL (ref 1.5–7.7)
NEUTROPHILS # BLD AUTO: 10.43 X10(3) UL (ref 1.5–7.7)
NEUTROPHILS # BLD AUTO: 10.93 X10 (3) UL (ref 1.5–7.7)
NEUTROPHILS # BLD AUTO: 10.93 X10(3) UL (ref 1.5–7.7)
NEUTROPHILS # BLD AUTO: 12.17 X10 (3) UL (ref 1.5–7.7)
NEUTROPHILS # BLD AUTO: 6.95 X10 (3) UL (ref 1.5–7.7)
NEUTROPHILS # BLD AUTO: 6.95 X10(3) UL (ref 1.5–7.7)
NEUTROPHILS # BLD AUTO: 7.81 X10 (3) UL (ref 1.5–7.7)
NEUTROPHILS # BLD AUTO: 7.81 X10(3) UL (ref 1.5–7.7)
NEUTROPHILS # BLD AUTO: 8.04 X10 (3) UL (ref 1.5–7.7)
NEUTROPHILS # BLD AUTO: 8.04 X10(3) UL (ref 1.5–7.7)
NEUTROPHILS # BLD AUTO: 8.98 X10 (3) UL (ref 1.5–7.7)
NEUTROPHILS # BLD AUTO: 8.98 X10(3) UL (ref 1.5–7.7)
NEUTROPHILS # BLD AUTO: 9.1 X10 (3) UL (ref 1.5–7.7)
NEUTROPHILS # BLD AUTO: 9.1 X10(3) UL (ref 1.5–7.7)
NEUTROPHILS # BLD AUTO: 9.26 X10 (3) UL (ref 1.5–7.7)
NEUTROPHILS # BLD AUTO: 9.26 X10(3) UL (ref 1.5–7.7)
NEUTROPHILS # BLD AUTO: 9.4 X10 (3) UL (ref 1.5–7.7)
NEUTROPHILS NFR BLD AUTO: 76.6 %
NEUTROPHILS NFR BLD AUTO: 78.2 %
NEUTROPHILS NFR BLD AUTO: 78.4 %
NEUTROPHILS NFR BLD AUTO: 78.5 %
NEUTROPHILS NFR BLD AUTO: 79.1 %
NEUTROPHILS NFR BLD AUTO: 79.3 %
NEUTROPHILS NFR BLD AUTO: 80.2 %
NEUTROPHILS NFR BLD AUTO: 82.9 %
NEUTROPHILS NFR BLD: 78 %
NEUTROPHILS NFR BLD: 80 %
NEUTS BAND NFR BLD: 1 %
NEUTS BAND NFR BLD: 1 %
NEUTS HYPERSEG # BLD: 12.96 X10(3) UL (ref 1.5–7.7)
NEUTS HYPERSEG # BLD: 9.95 X10(3) UL (ref 1.5–7.7)
NITRITE UR QL STRIP.AUTO: NEGATIVE
NRBC BLD MANUAL-RTO: 2 %
NRBC BLD MANUAL-RTO: 5 %
OSMOLALITY SERPL CALC.SUM OF ELEC: 295 MOSM/KG (ref 275–295)
OSMOLALITY SERPL CALC.SUM OF ELEC: 299 MOSM/KG (ref 275–295)
OSMOLALITY SERPL CALC.SUM OF ELEC: 303 MOSM/KG (ref 275–295)
OSMOLALITY SERPL CALC.SUM OF ELEC: 307 MOSM/KG (ref 275–295)
OSMOLALITY SERPL CALC.SUM OF ELEC: 310 MOSM/KG (ref 275–295)
OSMOLALITY SERPL CALC.SUM OF ELEC: 314 MOSM/KG (ref 275–295)
OSMOLALITY SERPL CALC.SUM OF ELEC: 315 MOSM/KG (ref 275–295)
OSMOLALITY SERPL CALC.SUM OF ELEC: 319 MOSM/KG (ref 275–295)
OSMOLALITY SERPL CALC.SUM OF ELEC: 319 MOSM/KG (ref 275–295)
OSMOLALITY SERPL CALC.SUM OF ELEC: 324 MOSM/KG (ref 275–295)
OSMOLALITY SERPL CALC.SUM OF ELEC: 325 MOSM/KG (ref 275–295)
OSMOLALITY SERPL CALC.SUM OF ELEC: 330 MOSM/KG (ref 275–295)
OSMOLALITY SERPL CALC.SUM OF ELEC: 332 MOSM/KG (ref 275–295)
OSMOLALITY SERPL CALC.SUM OF ELEC: 333 MOSM/KG (ref 275–295)
OSMOLALITY SERPL CALC.SUM OF ELEC: 340 MOSM/KG (ref 275–295)
OSMOLALITY SERPL CALC.SUM OF ELEC: 340 MOSM/KG (ref 275–295)
OSMOLALITY SERPL CALC.SUM OF ELEC: 342 MOSM/KG (ref 275–295)
OSMOLALITY SERPL CALC.SUM OF ELEC: 346 MOSM/KG (ref 275–295)
OXYHGB MFR BLDA: 95.9 % (ref 92–100)
OXYHGB MFR BLDA: 96.3 % (ref 92–100)
OXYHGB MFR BLDA: 96.7 % (ref 92–100)
OXYHGB MFR BLDA: 97 % (ref 92–100)
OXYHGB MFR BLDA: 97.4 % (ref 92–100)
OXYHGB MFR BLDA: 98 % (ref 92–100)
OXYHGB MFR BLDA: 98 % (ref 92–100)
OXYHGB MFR BLDA: 98.1 % (ref 92–100)
OXYHGB MFR BLDA: 98.3 % (ref 92–100)
OXYHGB MFR BLDA: 98.5 % (ref 92–100)
OXYHGB MFR BLDA: 98.7 % (ref 92–100)
P AXIS: 55 DEGREES
P AXIS: 62 DEGREES
P AXIS: 63 DEGREES
P AXIS: 66 DEGREES
P AXIS: 70 DEGREES
P-R INTERVAL: 136 MS
P-R INTERVAL: 136 MS
P-R INTERVAL: 150 MS
P-R INTERVAL: 156 MS
P-R INTERVAL: 160 MS
P/F RATIO: 384 MMHG
P/F RATIO: 414 MMHG
PARAINFLUENZA 1 PCR:: NOT DETECTED
PARAINFLUENZA 2 PCR:: NOT DETECTED
PARAINFLUENZA 3 PCR:: NOT DETECTED
PARAINFLUENZA 4 PCR:: NOT DETECTED
PCO2 BLD: 45.2 MMHG
PCO2 BLD: 48.6 MMHG
PCO2 BLD: 50.8 MMHG
PCO2 BLDA: 17 MM HG (ref 35–45)
PCO2 BLDA: 18 MM HG (ref 35–45)
PCO2 BLDA: 19 MM HG (ref 35–45)
PCO2 BLDA: 28 MM HG (ref 35–45)
PCO2 BLDA: 29 MM HG (ref 35–45)
PCO2 BLDA: 30 MM HG (ref 35–45)
PCO2 BLDA: 33 MM HG (ref 35–45)
PCO2 BLDA: 33 MM HG (ref 35–45)
PCO2 BLDA: 35 MM HG (ref 35–45)
PCO2 BLDA: 38 MM HG (ref 35–45)
PCO2 BLDA: 43 MM HG (ref 35–45)
PCO2 BLDA: 51 MM HG (ref 35–45)
PCO2 BLDA: <17 MM HG (ref 35–45)
PEEP: 5 CM H2O
PEEP: 7 CM H2O
PEEP: 7 CM H2O
PH BLD: 7.36 [PH]
PH BLD: 7.38 [PH]
PH BLD: 7.38 [PH]
PH BLDA: 7.18 [PH] (ref 7.35–7.45)
PH BLDA: 7.27 [PH] (ref 7.35–7.45)
PH BLDA: 7.32 [PH] (ref 7.35–7.45)
PH BLDA: 7.35 [PH] (ref 7.35–7.45)
PH BLDA: 7.35 [PH] (ref 7.35–7.45)
PH BLDA: 7.39 [PH] (ref 7.35–7.45)
PH BLDA: 7.47 [PH] (ref 7.35–7.45)
PH BLDA: 7.5 [PH] (ref 7.35–7.45)
PH BLDA: 7.51 [PH] (ref 7.35–7.45)
PH BLDA: 7.52 [PH] (ref 7.35–7.45)
PH BLDA: 7.52 [PH] (ref 7.35–7.45)
PH BLDA: 7.56 [PH] (ref 7.35–7.45)
PH BLDA: 7.64 [PH] (ref 7.35–7.45)
PH UR STRIP.AUTO: 6 [PH] (ref 5–8)
PHOSPHATE SERPL-MCNC: 2.2 MG/DL (ref 2.5–4.9)
PHOSPHATE SERPL-MCNC: 5.2 MG/DL (ref 2.5–4.9)
PLATELET # BLD AUTO: 101 10(3)UL (ref 150–450)
PLATELET # BLD AUTO: 113 10(3)UL (ref 150–450)
PLATELET # BLD AUTO: 118 10(3)UL (ref 150–450)
PLATELET # BLD AUTO: 127 10(3)UL (ref 150–450)
PLATELET # BLD AUTO: 128 10(3)UL (ref 150–450)
PLATELET # BLD AUTO: 128 10(3)UL (ref 150–450)
PLATELET # BLD AUTO: 156 10(3)UL (ref 150–450)
PLATELET # BLD AUTO: 159 10(3)UL (ref 150–450)
PLATELET # BLD AUTO: 171 10(3)UL (ref 150–450)
PLATELET # BLD AUTO: 177 10(3)UL (ref 150–450)
PLATELET # BLD AUTO: 187 10(3)UL (ref 150–450)
PLATELET # BLD AUTO: 191 10(3)UL (ref 150–450)
PLATELET # BLD AUTO: 210 10(3)UL (ref 150–450)
PLATELET # BLD AUTO: 210 10(3)UL (ref 150–450)
PLATELET # BLD AUTO: 75 10(3)UL (ref 150–450)
PLATELET # BLD AUTO: 81 10(3)UL (ref 150–450)
PLATELET # BLD AUTO: 82 10(3)UL (ref 150–450)
PLATELET # BLD AUTO: 90 10(3)UL (ref 150–450)
PLATELET MORPHOLOGY: NORMAL
PLATELET MORPHOLOGY: NORMAL
PO2 BLD: 115 MMHG
PO2 BLD: 248 MMHG
PO2 BLD: 85 MMHG
PO2 BLDA: 124 MM HG (ref 80–100)
PO2 BLDA: 158 MM HG (ref 80–100)
PO2 BLDA: 192 MM HG (ref 80–100)
PO2 BLDA: 204 MM HG (ref 80–100)
PO2 BLDA: 207 MM HG (ref 80–100)
PO2 BLDA: 208 MM HG (ref 80–100)
PO2 BLDA: 237 MM HG (ref 80–100)
PO2 BLDA: 274 MM HG (ref 80–100)
PO2 BLDA: 352 MM HG (ref 80–100)
PO2 BLDA: 392 MM HG (ref 80–100)
PO2 BLDA: 400 MM HG (ref 80–100)
PO2 BLDA: 85 MM HG (ref 80–100)
PO2 BLDA: 95 MM HG (ref 80–100)
POTASSIUM BLD-SCNC: 3 MMOL/L (ref 3.6–5.1)
POTASSIUM BLD-SCNC: 3.1 MMOL/L (ref 3.6–5.1)
POTASSIUM BLD-SCNC: 3.5 MMOL/L (ref 3.6–5.1)
POTASSIUM BLD-SCNC: 3.7 MMOL/L (ref 3.6–5.1)
POTASSIUM BLD-SCNC: 3.8 MMOL/L (ref 3.6–5.1)
POTASSIUM BLD-SCNC: 3.8 MMOL/L (ref 3.6–5.1)
POTASSIUM BLD-SCNC: 3.9 MMOL/L (ref 3.6–5.1)
POTASSIUM BLD-SCNC: 4.3 MMOL/L (ref 3.6–5.1)
POTASSIUM BLD-SCNC: 4.5 MMOL/L (ref 3.6–5.1)
POTASSIUM BLD-SCNC: 4.6 MMOL/L (ref 3.6–5.1)
POTASSIUM BLD-SCNC: 5.1 MMOL/L (ref 3.6–5.1)
POTASSIUM SERPL-SCNC: 3.1 MMOL/L (ref 3.5–5.1)
POTASSIUM SERPL-SCNC: 3.1 MMOL/L (ref 3.5–5.1)
POTASSIUM SERPL-SCNC: 3.2 MMOL/L (ref 3.5–5.1)
POTASSIUM SERPL-SCNC: 3.3 MMOL/L (ref 3.5–5.1)
POTASSIUM SERPL-SCNC: 3.4 MMOL/L (ref 3.5–5.1)
POTASSIUM SERPL-SCNC: 3.5 MMOL/L (ref 3.5–5.1)
POTASSIUM SERPL-SCNC: 3.5 MMOL/L (ref 3.5–5.1)
POTASSIUM SERPL-SCNC: 3.7 MMOL/L (ref 3.5–5.1)
POTASSIUM SERPL-SCNC: 3.8 MMOL/L (ref 3.5–5.1)
POTASSIUM SERPL-SCNC: 3.9 MMOL/L (ref 3.5–5.1)
POTASSIUM SERPL-SCNC: 4 MMOL/L (ref 3.5–5.1)
POTASSIUM SERPL-SCNC: 4.1 MMOL/L (ref 3.5–5.1)
POTASSIUM SERPL-SCNC: 4.2 MMOL/L (ref 3.5–5.1)
PRESSURE SUPPORT: 5 CM H2O
PRESSURE SUPPORT: 8 CM H2O
PROT SERPL-MCNC: 5.7 G/DL (ref 6.4–8.2)
PROT SERPL-MCNC: 5.8 G/DL (ref 6.4–8.2)
PROT SERPL-MCNC: 6.1 G/DL (ref 6.4–8.2)
PROT SERPL-MCNC: 6.1 G/DL (ref 6.4–8.2)
PROT SERPL-MCNC: 6.2 G/DL (ref 6.4–8.2)
PROT SERPL-MCNC: 6.3 G/DL (ref 6.4–8.2)
PROT SERPL-MCNC: 7 G/DL (ref 6.4–8.2)
PROTHROMBIN TIME: 18.9 SECONDS (ref 11.6–14.8)
Q-T INTERVAL: 340 MS
Q-T INTERVAL: 354 MS
Q-T INTERVAL: 362 MS
Q-T INTERVAL: 402 MS
Q-T INTERVAL: 424 MS
Q-T INTERVAL: 478 MS
QRS DURATION: 138 MS
QRS DURATION: 140 MS
QRS DURATION: 142 MS
QRS DURATION: 144 MS
QRS DURATION: 144 MS
QRS DURATION: 152 MS
QTC CALCULATION (BEZET): 435 MS
QTC CALCULATION (BEZET): 454 MS
QTC CALCULATION (BEZET): 469 MS
QTC CALCULATION (BEZET): 472 MS
QTC CALCULATION (BEZET): 492 MS
QTC CALCULATION (BEZET): 501 MS
R AXIS: -89 DEGREES
R AXIS: 140 DEGREES
R AXIS: 141 DEGREES
R AXIS: 146 DEGREES
R AXIS: 148 DEGREES
R AXIS: 151 DEGREES
RBC # BLD AUTO: 3.17 X10(6)UL
RBC # BLD AUTO: 3.2 X10(6)UL
RBC # BLD AUTO: 3.25 X10(6)UL
RBC # BLD AUTO: 3.26 X10(6)UL
RBC # BLD AUTO: 3.31 X10(6)UL
RBC # BLD AUTO: 3.37 X10(6)UL
RBC # BLD AUTO: 3.39 X10(6)UL
RBC # BLD AUTO: 3.5 X10(6)UL
RBC # BLD AUTO: 3.75 X10(6)UL
RBC # BLD AUTO: 4.06 X10(6)UL
RBC # BLD AUTO: 4.18 X10(6)UL
RBC # BLD AUTO: 4.22 X10(6)UL
RBC # BLD AUTO: 4.29 X10(6)UL
RBC # BLD AUTO: 4.62 X10(6)UL
RBC # BLD AUTO: 5.36 X10(6)UL
RBC #/AREA URNS AUTO: >10 /HPF
RH BLOOD TYPE: POSITIVE
RHINOVIRUS/ENTERO PCR:: NOT DETECTED
RSV RNA SPEC QL NAA+PROBE: NOT DETECTED
SAO2 % BLD: 100 %
SAO2 % BLD: 96 %
SAO2 % BLD: 98 %
SARS-COV-2 RNA NPH QL NAA+NON-PROBE: NOT DETECTED
SARS-COV-2 RNA RESP QL NAA+PROBE: NOT DETECTED
SARS-COV-2 RNA RESP QL NAA+PROBE: NOT DETECTED
SODIUM BLD-SCNC: 134 MMOL/L (ref 135–145)
SODIUM BLD-SCNC: 135 MMOL/L (ref 135–145)
SODIUM BLD-SCNC: 135 MMOL/L (ref 135–145)
SODIUM BLD-SCNC: 137 MMOL/L (ref 135–145)
SODIUM BLD-SCNC: 137 MMOL/L (ref 135–145)
SODIUM BLD-SCNC: 138 MMOL/L (ref 135–145)
SODIUM BLD-SCNC: 139 MMOL/L (ref 136–145)
SODIUM BLD-SCNC: 139 MMOL/L (ref 136–145)
SODIUM BLD-SCNC: 140 MMOL/L (ref 136–145)
SODIUM BLD-SCNC: <107 MMOL/L (ref 135–145)
SODIUM SERPL-SCNC: 136 MMOL/L (ref 136–145)
SODIUM SERPL-SCNC: 136 MMOL/L (ref 136–145)
SODIUM SERPL-SCNC: 137 MMOL/L (ref 136–145)
SODIUM SERPL-SCNC: 138 MMOL/L (ref 136–145)
SODIUM SERPL-SCNC: 139 MMOL/L (ref 136–145)
SODIUM SERPL-SCNC: 139 MMOL/L (ref 136–145)
SODIUM SERPL-SCNC: 140 MMOL/L (ref 136–145)
SODIUM SERPL-SCNC: 141 MMOL/L (ref 136–145)
SODIUM SERPL-SCNC: 141 MMOL/L (ref 136–145)
SODIUM SERPL-SCNC: 142 MMOL/L (ref 136–145)
SODIUM SERPL-SCNC: 142 MMOL/L (ref 136–145)
SODIUM SERPL-SCNC: 143 MMOL/L (ref 136–145)
SODIUM SERPL-SCNC: 146 MMOL/L (ref 136–145)
SP GR UR STRIP.AUTO: 1.02 (ref 1–1.03)
T AXIS: 0 DEGREES
T AXIS: 123 DEGREES
T AXIS: 16 DEGREES
T AXIS: 24 DEGREES
T AXIS: 89 DEGREES
T AXIS: 89 DEGREES
TIDAL VOLUME: 400 ML
TIDAL VOLUME: 450 ML
TIDAL VOLUME: 5 ML
TIDAL VOLUME: 500 ML
TOTAL CELLS COUNTED BLD: 100
TOTAL CELLS COUNTED BLD: 100
TRIGL SERPL-MCNC: 229 MG/DL (ref 30–149)
TRIGL SERPL-MCNC: 242 MG/DL (ref 30–149)
TRIGL SERPL-MCNC: 272 MG/DL (ref 30–149)
TRIGL SERPL-MCNC: 314 MG/DL (ref 30–149)
TRIGL SERPL-MCNC: 390 MG/DL (ref 30–149)
TRIGL SERPL-MCNC: 419 MG/DL (ref 30–149)
TROPONIN I HIGH SENSITIVITY: ABNORMAL NG/L
TROPONIN I HIGH SENSITIVITY: ABNORMAL NG/L
UROBILINOGEN UR STRIP.AUTO-MCNC: 1 MG/DL
VENT RATE: 10 /MIN
VENT RATE: 10 /MIN
VENT RATE: 12 /MIN
VENT RATE: 14 /MIN
VENT RATE: 14 /MIN
VENT RATE: 18 /MIN
VENT RATE: 28 /MIN
VENTRICULAR RATE: 126 BPM
VENTRICULAR RATE: 58 BPM
VENTRICULAR RATE: 83 BPM
VENTRICULAR RATE: 84 BPM
VENTRICULAR RATE: 87 BPM
VENTRICULAR RATE: 99 BPM
WBC # BLD AUTO: 10.5 X10(3) UL (ref 4–11)
WBC # BLD AUTO: 11.5 X10(3) UL (ref 4–11)
WBC # BLD AUTO: 11.5 X10(3) UL (ref 4–11)
WBC # BLD AUTO: 11.6 X10(3) UL (ref 4–11)
WBC # BLD AUTO: 11.7 X10(3) UL (ref 4–11)
WBC # BLD AUTO: 12.6 X10(3) UL (ref 4–11)
WBC # BLD AUTO: 13.4 X10(3) UL (ref 4–11)
WBC # BLD AUTO: 13.9 X10(3) UL (ref 4–11)
WBC # BLD AUTO: 15.4 X10(3) UL (ref 4–11)
WBC # BLD AUTO: 16 X10(3) UL (ref 4–11)
WBC # BLD AUTO: 7.6 X10(3) UL (ref 4–11)
WBC # BLD AUTO: 7.7 X10(3) UL (ref 4–11)
WBC # BLD AUTO: 8.8 X10(3) UL (ref 4–11)
WBC # BLD AUTO: 9.4 X10(3) UL (ref 4–11)
WBC # BLD AUTO: 9.6 X10(3) UL (ref 4–11)

## 2022-01-01 PROCEDURE — 94003 VENT MGMT INPAT SUBQ DAY: CPT

## 2022-01-01 PROCEDURE — S0171 BUMETANIDE 0.5 MG: HCPCS | Performed by: INTERNAL MEDICINE

## 2022-01-01 PROCEDURE — 86920 COMPATIBILITY TEST SPIN: CPT

## 2022-01-01 PROCEDURE — 87040 BLOOD CULTURE FOR BACTERIA: CPT | Performed by: INTERNAL MEDICINE

## 2022-01-01 PROCEDURE — 93005 ELECTROCARDIOGRAM TRACING: CPT

## 2022-01-01 PROCEDURE — 82330 ASSAY OF CALCIUM: CPT | Performed by: INTERNAL MEDICINE

## 2022-01-01 PROCEDURE — 87070 CULTURE OTHR SPECIMN AEROBIC: CPT | Performed by: INTERNAL MEDICINE

## 2022-01-01 PROCEDURE — 31500 INSERT EMERGENCY AIRWAY: CPT

## 2022-01-01 PROCEDURE — C9113 INJ PANTOPRAZOLE SODIUM, VIA: HCPCS | Performed by: INTERNAL MEDICINE

## 2022-01-01 PROCEDURE — 85018 HEMOGLOBIN: CPT | Performed by: INTERNAL MEDICINE

## 2022-01-01 PROCEDURE — 83050 HGB METHEMOGLOBIN QUAN: CPT | Performed by: INTERNAL MEDICINE

## 2022-01-01 PROCEDURE — 93010 ELECTROCARDIOGRAM REPORT: CPT | Performed by: INTERNAL MEDICINE

## 2022-01-01 PROCEDURE — 82803 BLOOD GASES ANY COMBINATION: CPT | Performed by: INTERNAL MEDICINE

## 2022-01-01 PROCEDURE — 80053 COMPREHEN METABOLIC PANEL: CPT | Performed by: INTERNAL MEDICINE

## 2022-01-01 PROCEDURE — 4A1239Z MONITORING OF CARDIAC OUTPUT, PERCUTANEOUS APPROACH: ICD-10-PCS | Performed by: ANESTHESIOLOGY

## 2022-01-01 PROCEDURE — 84132 ASSAY OF SERUM POTASSIUM: CPT | Performed by: INTERNAL MEDICINE

## 2022-01-01 PROCEDURE — 84132 ASSAY OF SERUM POTASSIUM: CPT

## 2022-01-01 PROCEDURE — 82375 ASSAY CARBOXYHB QUANT: CPT | Performed by: INTERNAL MEDICINE

## 2022-01-01 PROCEDURE — 76700 US EXAM ABDOM COMPLETE: CPT | Performed by: INTERNAL MEDICINE

## 2022-01-01 PROCEDURE — 82962 GLUCOSE BLOOD TEST: CPT

## 2022-01-01 PROCEDURE — 82803 BLOOD GASES ANY COMBINATION: CPT | Performed by: ANESTHESIOLOGY

## 2022-01-01 PROCEDURE — 85730 THROMBOPLASTIN TIME PARTIAL: CPT | Performed by: INTERNAL MEDICINE

## 2022-01-01 PROCEDURE — 93306 TTE W/DOPPLER COMPLETE: CPT | Performed by: NURSE PRACTITIONER

## 2022-01-01 PROCEDURE — 51701 INSERT BLADDER CATHETER: CPT

## 2022-01-01 PROCEDURE — 93306 TTE W/DOPPLER COMPLETE: CPT | Performed by: INTERNAL MEDICINE

## 2022-01-01 PROCEDURE — 85025 COMPLETE CBC W/AUTO DIFF WBC: CPT | Performed by: INTERNAL MEDICINE

## 2022-01-01 PROCEDURE — 71045 X-RAY EXAM CHEST 1 VIEW: CPT | Performed by: INTERNAL MEDICINE

## 2022-01-01 PROCEDURE — 85730 THROMBOPLASTIN TIME PARTIAL: CPT | Performed by: HOSPITALIST

## 2022-01-01 PROCEDURE — 85007 BL SMEAR W/DIFF WBC COUNT: CPT | Performed by: INTERNAL MEDICINE

## 2022-01-01 PROCEDURE — 87077 CULTURE AEROBIC IDENTIFY: CPT | Performed by: INTERNAL MEDICINE

## 2022-01-01 PROCEDURE — 94799 UNLISTED PULMONARY SVC/PX: CPT

## 2022-01-01 PROCEDURE — 86900 BLOOD TYPING SEROLOGIC ABO: CPT | Performed by: NURSE PRACTITIONER

## 2022-01-01 PROCEDURE — 80048 BASIC METABOLIC PNL TOTAL CA: CPT | Performed by: INTERNAL MEDICINE

## 2022-01-01 PROCEDURE — 85027 COMPLETE CBC AUTOMATED: CPT | Performed by: INTERNAL MEDICINE

## 2022-01-01 PROCEDURE — 80048 BASIC METABOLIC PNL TOTAL CA: CPT | Performed by: NURSE PRACTITIONER

## 2022-01-01 PROCEDURE — 84295 ASSAY OF SERUM SODIUM: CPT | Performed by: INTERNAL MEDICINE

## 2022-01-01 PROCEDURE — 84478 ASSAY OF TRIGLYCERIDES: CPT | Performed by: INTERNAL MEDICINE

## 2022-01-01 PROCEDURE — 0202U NFCT DS 22 TRGT SARS-COV-2: CPT | Performed by: INTERNAL MEDICINE

## 2022-01-01 PROCEDURE — 36556 INSERT NON-TUNNEL CV CATH: CPT

## 2022-01-01 PROCEDURE — 85025 COMPLETE CBC W/AUTO DIFF WBC: CPT | Performed by: NURSE PRACTITIONER

## 2022-01-01 PROCEDURE — 83605 ASSAY OF LACTIC ACID: CPT | Performed by: INTERNAL MEDICINE

## 2022-01-01 PROCEDURE — 81001 URINALYSIS AUTO W/SCOPE: CPT | Performed by: INTERNAL MEDICINE

## 2022-01-01 PROCEDURE — 84132 ASSAY OF SERUM POTASSIUM: CPT | Performed by: ANESTHESIOLOGY

## 2022-01-01 PROCEDURE — 87205 SMEAR GRAM STAIN: CPT | Performed by: INTERNAL MEDICINE

## 2022-01-01 PROCEDURE — 94150 VITAL CAPACITY TEST: CPT

## 2022-01-01 PROCEDURE — 36600 WITHDRAWAL OF ARTERIAL BLOOD: CPT | Performed by: INTERNAL MEDICINE

## 2022-01-01 PROCEDURE — 83735 ASSAY OF MAGNESIUM: CPT | Performed by: HOSPITALIST

## 2022-01-01 PROCEDURE — 92950 HEART/LUNG RESUSCITATION CPR: CPT

## 2022-01-01 PROCEDURE — 93308 TTE F-UP OR LMTD: CPT | Performed by: INTERNAL MEDICINE

## 2022-01-01 PROCEDURE — 85025 COMPLETE CBC W/AUTO DIFF WBC: CPT | Performed by: HOSPITALIST

## 2022-01-01 PROCEDURE — 87186 SC STD MICRODIL/AGAR DIL: CPT | Performed by: INTERNAL MEDICINE

## 2022-01-01 PROCEDURE — 84295 ASSAY OF SERUM SODIUM: CPT

## 2022-01-01 PROCEDURE — B24BZZ4 ULTRASONOGRAPHY OF HEART WITH AORTA, TRANSESOPHAGEAL: ICD-10-PCS | Performed by: INTERNAL MEDICINE

## 2022-01-01 PROCEDURE — 51702 INSERT TEMP BLADDER CATH: CPT

## 2022-01-01 PROCEDURE — 94002 VENT MGMT INPAT INIT DAY: CPT

## 2022-01-01 PROCEDURE — 84295 ASSAY OF SERUM SODIUM: CPT | Performed by: HOSPITALIST

## 2022-01-01 PROCEDURE — 06HY33Z INSERTION OF INFUSION DEVICE INTO LOWER VEIN, PERCUTANEOUS APPROACH: ICD-10-PCS | Performed by: INTERNAL MEDICINE

## 2022-01-01 PROCEDURE — 85018 HEMOGLOBIN: CPT | Performed by: HOSPITALIST

## 2022-01-01 PROCEDURE — 85347 COAGULATION TIME ACTIVATED: CPT

## 2022-01-01 PROCEDURE — 4A133B3 MONITORING OF ARTERIAL PRESSURE, PULMONARY, PERCUTANEOUS APPROACH: ICD-10-PCS | Performed by: ANESTHESIOLOGY

## 2022-01-01 PROCEDURE — 84100 ASSAY OF PHOSPHORUS: CPT | Performed by: HOSPITALIST

## 2022-01-01 PROCEDURE — 85014 HEMATOCRIT: CPT

## 2022-01-01 PROCEDURE — 03HY32Z INSERTION OF MONITORING DEVICE INTO UPPER ARTERY, PERCUTANEOUS APPROACH: ICD-10-PCS | Performed by: ANESTHESIOLOGY

## 2022-01-01 PROCEDURE — 86901 BLOOD TYPING SEROLOGIC RH(D): CPT | Performed by: NURSE PRACTITIONER

## 2022-01-01 PROCEDURE — 82330 ASSAY OF CALCIUM: CPT | Performed by: HOSPITALIST

## 2022-01-01 PROCEDURE — 80048 BASIC METABOLIC PNL TOTAL CA: CPT

## 2022-01-01 PROCEDURE — 85049 AUTOMATED PLATELET COUNT: CPT | Performed by: INTERNAL MEDICINE

## 2022-01-01 PROCEDURE — 80048 BASIC METABOLIC PNL TOTAL CA: CPT | Performed by: HOSPITALIST

## 2022-01-01 PROCEDURE — 85018 HEMOGLOBIN: CPT | Performed by: ANESTHESIOLOGY

## 2022-01-01 PROCEDURE — 74018 RADEX ABDOMEN 1 VIEW: CPT | Performed by: INTERNAL MEDICINE

## 2022-01-01 PROCEDURE — 84132 ASSAY OF SERUM POTASSIUM: CPT | Performed by: HOSPITALIST

## 2022-01-01 PROCEDURE — 3E033XZ INTRODUCTION OF VASOPRESSOR INTO PERIPHERAL VEIN, PERCUTANEOUS APPROACH: ICD-10-PCS | Performed by: INTERNAL MEDICINE

## 2022-01-01 PROCEDURE — 82330 ASSAY OF CALCIUM: CPT | Performed by: ANESTHESIOLOGY

## 2022-01-01 PROCEDURE — 5A02210 ASSISTANCE WITH CARDIAC OUTPUT USING BALLOON PUMP, CONTINUOUS: ICD-10-PCS | Performed by: INTERNAL MEDICINE

## 2022-01-01 PROCEDURE — 4A133J1 MONITORING OF ARTERIAL PULSE, PERIPHERAL, PERCUTANEOUS APPROACH: ICD-10-PCS | Performed by: ANESTHESIOLOGY

## 2022-01-01 PROCEDURE — 85027 COMPLETE CBC AUTOMATED: CPT

## 2022-01-01 PROCEDURE — 84484 ASSAY OF TROPONIN QUANT: CPT | Performed by: NURSE PRACTITIONER

## 2022-01-01 PROCEDURE — S0171 BUMETANIDE 0.5 MG: HCPCS | Performed by: NURSE PRACTITIONER

## 2022-01-01 PROCEDURE — B211YZZ FLUOROSCOPY OF MULTIPLE CORONARY ARTERIES USING OTHER CONTRAST: ICD-10-PCS | Performed by: INTERNAL MEDICINE

## 2022-01-01 PROCEDURE — 83050 HGB METHEMOGLOBIN QUAN: CPT | Performed by: HOSPITALIST

## 2022-01-01 PROCEDURE — 87999 UNLISTED MICROBIOLOGY PX: CPT

## 2022-01-01 PROCEDURE — 83735 ASSAY OF MAGNESIUM: CPT | Performed by: NURSE PRACTITIONER

## 2022-01-01 PROCEDURE — 93320 DOPPLER ECHO COMPLETE: CPT | Performed by: NURSE PRACTITIONER

## 2022-01-01 PROCEDURE — 84295 ASSAY OF SERUM SODIUM: CPT | Performed by: ANESTHESIOLOGY

## 2022-01-01 PROCEDURE — 82375 ASSAY CARBOXYHB QUANT: CPT | Performed by: ANESTHESIOLOGY

## 2022-01-01 PROCEDURE — 02HP32Z INSERTION OF MONITORING DEVICE INTO PULMONARY TRUNK, PERCUTANEOUS APPROACH: ICD-10-PCS | Performed by: ANESTHESIOLOGY

## 2022-01-01 PROCEDURE — 92610 EVALUATE SWALLOWING FUNCTION: CPT

## 2022-01-01 PROCEDURE — 02UG3JZ SUPPLEMENT MITRAL VALVE WITH SYNTHETIC SUBSTITUTE, PERCUTANEOUS APPROACH: ICD-10-PCS | Performed by: INTERNAL MEDICINE

## 2022-01-01 PROCEDURE — 33967 INSERT I-AORT PERCUT DEVICE: CPT | Performed by: INTERNAL MEDICINE

## 2022-01-01 PROCEDURE — 4A133B1 MONITORING OF ARTERIAL PRESSURE, PERIPHERAL, PERCUTANEOUS APPROACH: ICD-10-PCS | Performed by: ANESTHESIOLOGY

## 2022-01-01 PROCEDURE — 93325 DOPPLER ECHO COLOR FLOW MAPG: CPT | Performed by: NURSE PRACTITIONER

## 2022-01-01 PROCEDURE — 92920 PRQ TRLUML C ANGIOP 1ART&/BR: CPT | Performed by: INTERNAL MEDICINE

## 2022-01-01 PROCEDURE — 02U03JZ SUPPLEMENT CORONARY ARTERY, ONE ARTERY WITH SYNTHETIC SUBSTITUTE, PERCUTANEOUS APPROACH: ICD-10-PCS | Performed by: INTERNAL MEDICINE

## 2022-01-01 PROCEDURE — 83605 ASSAY OF LACTIC ACID: CPT | Performed by: ANESTHESIOLOGY

## 2022-01-01 PROCEDURE — S0171 BUMETANIDE 0.5 MG: HCPCS | Performed by: ANESTHESIOLOGY

## 2022-01-01 PROCEDURE — 5A1955Z RESPIRATORY VENTILATION, GREATER THAN 96 CONSECUTIVE HOURS: ICD-10-PCS | Performed by: ANESTHESIOLOGY

## 2022-01-01 PROCEDURE — 83605 ASSAY OF LACTIC ACID: CPT | Performed by: HOSPITALIST

## 2022-01-01 PROCEDURE — 33418 REPAIR TCAT MITRAL VALVE: CPT | Performed by: INTERNAL MEDICINE

## 2022-01-01 PROCEDURE — 0BH18EZ INSERTION OF ENDOTRACHEAL AIRWAY INTO TRACHEA, VIA NATURAL OR ARTIFICIAL OPENING ENDOSCOPIC: ICD-10-PCS | Performed by: ANESTHESIOLOGY

## 2022-01-01 PROCEDURE — 85025 COMPLETE CBC W/AUTO DIFF WBC: CPT | Performed by: PHYSICIAN ASSISTANT

## 2022-01-01 PROCEDURE — 87147 CULTURE TYPE IMMUNOLOGIC: CPT | Performed by: INTERNAL MEDICINE

## 2022-01-01 PROCEDURE — 82803 BLOOD GASES ANY COMBINATION: CPT

## 2022-01-01 PROCEDURE — 83050 HGB METHEMOGLOBIN QUAN: CPT | Performed by: ANESTHESIOLOGY

## 2022-01-01 PROCEDURE — 99152 MOD SED SAME PHYS/QHP 5/>YRS: CPT | Performed by: INTERNAL MEDICINE

## 2022-01-01 PROCEDURE — 82330 ASSAY OF CALCIUM: CPT

## 2022-01-01 PROCEDURE — 99153 MOD SED SAME PHYS/QHP EA: CPT | Performed by: INTERNAL MEDICINE

## 2022-01-01 PROCEDURE — 5A12012 PERFORMANCE OF CARDIAC OUTPUT, SINGLE, MANUAL: ICD-10-PCS | Performed by: HOSPITALIST

## 2022-01-01 PROCEDURE — 82803 BLOOD GASES ANY COMBINATION: CPT | Performed by: HOSPITALIST

## 2022-01-01 PROCEDURE — 86850 RBC ANTIBODY SCREEN: CPT | Performed by: NURSE PRACTITIONER

## 2022-01-01 PROCEDURE — 85610 PROTHROMBIN TIME: CPT | Performed by: INTERNAL MEDICINE

## 2022-01-01 PROCEDURE — B245ZZ4 ULTRASONOGRAPHY OF LEFT HEART, TRANSESOPHAGEAL: ICD-10-PCS | Performed by: INTERNAL MEDICINE

## 2022-01-01 PROCEDURE — 02713ZZ DILATION OF CORONARY ARTERY, TWO ARTERIES, PERCUTANEOUS APPROACH: ICD-10-PCS | Performed by: INTERNAL MEDICINE

## 2022-01-01 PROCEDURE — 82375 ASSAY CARBOXYHB QUANT: CPT | Performed by: HOSPITALIST

## 2022-01-01 PROCEDURE — 76942 ECHO GUIDE FOR BIOPSY: CPT | Performed by: ANESTHESIOLOGY

## 2022-01-01 PROCEDURE — 02C03ZZ EXTIRPATION OF MATTER FROM CORONARY ARTERY, ONE ARTERY, PERCUTANEOUS APPROACH: ICD-10-PCS | Performed by: INTERNAL MEDICINE

## 2022-01-01 RX ORDER — VANCOMYCIN 2 GRAM/500 ML IN 0.9 % SODIUM CHLORIDE INTRAVENOUS
25 ONCE
Status: COMPLETED | OUTPATIENT
Start: 2022-01-01 | End: 2022-01-01

## 2022-01-01 RX ORDER — NICOTINE POLACRILEX 4 MG
15 LOZENGE BUCCAL
Status: DISCONTINUED | OUTPATIENT
Start: 2022-01-01 | End: 2022-01-01

## 2022-01-01 RX ORDER — DEXMEDETOMIDINE HYDROCHLORIDE 4 UG/ML
INJECTION, SOLUTION INTRAVENOUS CONTINUOUS
Status: DISCONTINUED | OUTPATIENT
Start: 2022-01-01 | End: 2022-01-01

## 2022-01-01 RX ORDER — AMIODARONE HYDROCHLORIDE 200 MG/1
400 TABLET ORAL 3 TIMES DAILY
Status: COMPLETED | OUTPATIENT
Start: 2022-01-01 | End: 2022-01-01

## 2022-01-01 RX ORDER — NICOTINE POLACRILEX 4 MG
30 LOZENGE BUCCAL
Status: DISCONTINUED | OUTPATIENT
Start: 2022-01-01 | End: 2022-01-01

## 2022-01-01 RX ORDER — ASPIRIN 81 MG/1
81 TABLET, CHEWABLE ORAL DAILY
Status: DISCONTINUED | OUTPATIENT
Start: 2022-01-01 | End: 2022-01-01

## 2022-01-01 RX ORDER — POTASSIUM CHLORIDE 20 MEQ/1
40 TABLET, EXTENDED RELEASE ORAL ONCE
Status: COMPLETED | OUTPATIENT
Start: 2022-01-01 | End: 2022-01-01

## 2022-01-01 RX ORDER — HEPARIN SODIUM AND DEXTROSE 10000; 5 [USP'U]/100ML; G/100ML
INJECTION INTRAVENOUS CONTINUOUS
Status: DISCONTINUED | OUTPATIENT
Start: 2022-01-01 | End: 2022-01-01

## 2022-01-01 RX ORDER — BUMETANIDE 0.25 MG/ML
2 INJECTION, SOLUTION INTRAMUSCULAR; INTRAVENOUS
Status: COMPLETED | OUTPATIENT
Start: 2022-01-01 | End: 2022-01-01

## 2022-01-01 RX ORDER — POLYETHYLENE GLYCOL 3350 17 G/17G
17 POWDER, FOR SOLUTION ORAL DAILY PRN
Status: DISCONTINUED | OUTPATIENT
Start: 2022-01-01 | End: 2022-01-01

## 2022-01-01 RX ORDER — CALCIUM CHLORIDE 100 MG/ML
INJECTION INTRAVENOUS; INTRAVENTRICULAR AS NEEDED
Status: DISCONTINUED | OUTPATIENT
Start: 2022-01-01 | End: 2022-01-01 | Stop reason: SURG

## 2022-01-01 RX ORDER — SODIUM BICARBONATE 325 MG/1
325 TABLET ORAL AS NEEDED
Status: DISCONTINUED | OUTPATIENT
Start: 2022-01-01 | End: 2022-01-01

## 2022-01-01 RX ORDER — ACETAMINOPHEN 650 MG/1
650 SUPPOSITORY RECTAL EVERY 4 HOURS PRN
Status: DISCONTINUED | OUTPATIENT
Start: 2022-01-01 | End: 2022-01-01

## 2022-01-01 RX ORDER — BUMETANIDE 0.25 MG/ML
2 INJECTION, SOLUTION INTRAMUSCULAR; INTRAVENOUS ONCE
Status: COMPLETED | OUTPATIENT
Start: 2022-01-01 | End: 2022-01-01

## 2022-01-01 RX ORDER — ASPIRIN 81 MG/1
81 TABLET ORAL DAILY
Status: DISCONTINUED | OUTPATIENT
Start: 2022-01-01 | End: 2022-01-01

## 2022-01-01 RX ORDER — LIDOCAINE HYDROCHLORIDE 10 MG/ML
INJECTION, SOLUTION EPIDURAL; INFILTRATION; INTRACAUDAL; PERINEURAL
Status: COMPLETED
Start: 2022-01-01 | End: 2022-01-01

## 2022-01-01 RX ORDER — POTASSIUM CHLORIDE 1.5 G/1.77G
40 POWDER, FOR SOLUTION ORAL ONCE
Status: COMPLETED | OUTPATIENT
Start: 2022-01-01 | End: 2022-01-01

## 2022-01-01 RX ORDER — DOBUTAMINE HYDROCHLORIDE 200 MG/100ML
INJECTION INTRAVENOUS CONTINUOUS
Status: DISCONTINUED | OUTPATIENT
Start: 2022-01-01 | End: 2022-01-01

## 2022-01-01 RX ORDER — IODIXANOL 320 MG/ML
100 INJECTION, SOLUTION INTRAVASCULAR
Status: DISCONTINUED | OUTPATIENT
Start: 2022-01-01 | End: 2022-01-01

## 2022-01-01 RX ORDER — SODIUM CHLORIDE 9 MG/ML
INJECTION, SOLUTION INTRAVENOUS CONTINUOUS PRN
Status: DISCONTINUED | OUTPATIENT
Start: 2022-01-01 | End: 2022-01-01 | Stop reason: SURG

## 2022-01-01 RX ORDER — NOREPINEPHRINE BITARTRATE 1 MG/ML
INJECTION, SOLUTION INTRAVENOUS
Status: COMPLETED
Start: 2022-01-01 | End: 2022-01-01

## 2022-01-01 RX ORDER — SODIUM PHOSPHATE, DIBASIC AND SODIUM PHOSPHATE, MONOBASIC 7; 19 G/133ML; G/133ML
1 ENEMA RECTAL ONCE AS NEEDED
Status: DISCONTINUED | OUTPATIENT
Start: 2022-01-01 | End: 2022-01-01

## 2022-01-01 RX ORDER — BUMETANIDE 0.25 MG/ML
0.5 INJECTION, SOLUTION INTRAMUSCULAR; INTRAVENOUS ONCE
Status: COMPLETED | OUTPATIENT
Start: 2022-01-01 | End: 2022-01-01

## 2022-01-01 RX ORDER — FUROSEMIDE 10 MG/ML
40 INJECTION INTRAMUSCULAR; INTRAVENOUS DAILY
Status: DISCONTINUED | OUTPATIENT
Start: 2022-01-01 | End: 2022-01-01

## 2022-01-01 RX ORDER — POTASSIUM CHLORIDE 14.9 MG/ML
20 INJECTION INTRAVENOUS ONCE
Status: COMPLETED | OUTPATIENT
Start: 2022-01-01 | End: 2022-01-01

## 2022-01-01 RX ORDER — AMIODARONE HYDROCHLORIDE 200 MG/1
400 TABLET ORAL DAILY
Status: DISCONTINUED | OUTPATIENT
Start: 2022-01-01 | End: 2022-01-01

## 2022-01-01 RX ORDER — DOBUTAMINE HYDROCHLORIDE 200 MG/100ML
INJECTION INTRAVENOUS
Status: COMPLETED
Start: 2022-01-01 | End: 2022-01-01

## 2022-01-01 RX ORDER — HEPARIN SODIUM AND DEXTROSE 10000; 5 [USP'U]/100ML; G/100ML
12 INJECTION INTRAVENOUS ONCE
Status: COMPLETED | OUTPATIENT
Start: 2022-01-01 | End: 2022-01-01

## 2022-01-01 RX ORDER — HEPARIN SODIUM AND DEXTROSE 10000; 5 [USP'U]/100ML; G/100ML
800 INJECTION INTRAVENOUS ONCE
Status: COMPLETED | OUTPATIENT
Start: 2022-01-01 | End: 2022-01-01

## 2022-01-01 RX ORDER — SODIUM BICARBONATE 150 MEQ/1,000 ML IN DEXTROSE 5 % INTRAVENOUS
83 SOLUTION CONTINUOUS
Status: DISCONTINUED | OUTPATIENT
Start: 2022-01-01 | End: 2022-01-01

## 2022-01-01 RX ORDER — MIDAZOLAM HYDROCHLORIDE 1 MG/ML
1 INJECTION INTRAMUSCULAR; INTRAVENOUS ONCE
Status: COMPLETED | OUTPATIENT
Start: 2022-01-01 | End: 2022-01-01

## 2022-01-01 RX ORDER — MIDAZOLAM HYDROCHLORIDE 1 MG/ML
INJECTION INTRAMUSCULAR; INTRAVENOUS
Status: COMPLETED
Start: 2022-01-01 | End: 2022-01-01

## 2022-01-01 RX ORDER — ACETAMINOPHEN 325 MG/1
650 TABLET ORAL EVERY 4 HOURS PRN
Status: DISCONTINUED | OUTPATIENT
Start: 2022-01-01 | End: 2022-01-01

## 2022-01-01 RX ORDER — ACETAMINOPHEN 160 MG/5ML
650 SOLUTION ORAL EVERY 4 HOURS PRN
Status: DISCONTINUED | OUTPATIENT
Start: 2022-01-01 | End: 2022-01-01

## 2022-01-01 RX ORDER — CLOPIDOGREL BISULFATE 75 MG/1
75 TABLET ORAL DAILY
Status: DISCONTINUED | OUTPATIENT
Start: 2022-01-01 | End: 2022-01-01

## 2022-01-01 RX ORDER — FUROSEMIDE 10 MG/ML
40 INJECTION INTRAMUSCULAR; INTRAVENOUS ONCE
Status: COMPLETED | OUTPATIENT
Start: 2022-01-01 | End: 2022-01-01

## 2022-01-01 RX ORDER — SODIUM BICARBONATE 150 MEQ/1,000 ML IN DEXTROSE 5 % INTRAVENOUS
84 SOLUTION CONTINUOUS
Status: DISCONTINUED | OUTPATIENT
Start: 2022-01-01 | End: 2022-01-01

## 2022-01-01 RX ORDER — HEPARIN SODIUM 5000 [USP'U]/ML
INJECTION, SOLUTION INTRAVENOUS; SUBCUTANEOUS AS NEEDED
Status: DISCONTINUED | OUTPATIENT
Start: 2022-01-01 | End: 2022-01-01 | Stop reason: SURG

## 2022-01-01 RX ORDER — SODIUM CHLORIDE 9 MG/ML
INJECTION, SOLUTION INTRAVENOUS
Status: ACTIVE | OUTPATIENT
Start: 2022-01-01 | End: 2022-01-01

## 2022-01-01 RX ORDER — DIGOXIN 0.25 MG/ML
125 INJECTION INTRAMUSCULAR; INTRAVENOUS DAILY
Status: DISCONTINUED | OUTPATIENT
Start: 2022-01-01 | End: 2022-01-01

## 2022-01-01 RX ORDER — BUMETANIDE 0.25 MG/ML
2 INJECTION, SOLUTION INTRAMUSCULAR; INTRAVENOUS
Status: DISCONTINUED | OUTPATIENT
Start: 2022-01-01 | End: 2022-01-01

## 2022-01-01 RX ORDER — ACETAMINOPHEN 10 MG/ML
1000 INJECTION, SOLUTION INTRAVENOUS EVERY 6 HOURS PRN
Status: DISCONTINUED | OUTPATIENT
Start: 2022-01-01 | End: 2022-01-01

## 2022-01-01 RX ORDER — FUROSEMIDE 10 MG/ML
40 INJECTION INTRAMUSCULAR; INTRAVENOUS 3 TIMES DAILY
Status: DISCONTINUED | OUTPATIENT
Start: 2022-01-01 | End: 2022-01-01

## 2022-01-01 RX ORDER — PHENYLEPHRINE HCL IN 0.9% NACL 50MG/250ML
PLASTIC BAG, INJECTION (ML) INTRAVENOUS CONTINUOUS
Status: DISCONTINUED | OUTPATIENT
Start: 2022-01-01 | End: 2022-01-01

## 2022-01-01 RX ORDER — METOPROLOL TARTRATE 5 MG/5ML
2.5 INJECTION INTRAVENOUS EVERY 6 HOURS
Status: DISCONTINUED | OUTPATIENT
Start: 2022-01-01 | End: 2022-01-01

## 2022-01-01 RX ORDER — POTASSIUM CHLORIDE 1.5 G/1.77G
40 POWDER, FOR SOLUTION ORAL EVERY 4 HOURS
Status: COMPLETED | OUTPATIENT
Start: 2022-01-01 | End: 2022-01-01

## 2022-01-01 RX ORDER — HEPARIN SODIUM 5000 [USP'U]/ML
INJECTION, SOLUTION INTRAVENOUS; SUBCUTANEOUS
Status: COMPLETED
Start: 2022-01-01 | End: 2022-01-01

## 2022-01-01 RX ORDER — SODIUM CHLORIDE 9 MG/ML
INJECTION, SOLUTION INTRAVENOUS CONTINUOUS
Status: ACTIVE | OUTPATIENT
Start: 2022-01-01 | End: 2022-01-01

## 2022-01-01 RX ORDER — BISACODYL 10 MG
10 SUPPOSITORY, RECTAL RECTAL
Status: DISCONTINUED | OUTPATIENT
Start: 2022-01-01 | End: 2022-01-01

## 2022-01-01 RX ORDER — DIPHENHYDRAMINE HYDROCHLORIDE 50 MG/ML
INJECTION INTRAMUSCULAR; INTRAVENOUS
Status: COMPLETED
Start: 2022-01-01 | End: 2022-01-01

## 2022-01-01 RX ORDER — SENNOSIDES 8.8 MG/5ML
10 LIQUID ORAL NIGHTLY PRN
Status: DISCONTINUED | OUTPATIENT
Start: 2022-01-01 | End: 2022-01-01

## 2022-01-01 RX ORDER — BUMETANIDE 0.25 MG/ML
2 INJECTION, SOLUTION INTRAMUSCULAR; INTRAVENOUS ONCE
Status: DISCONTINUED | OUTPATIENT
Start: 2022-01-01 | End: 2022-01-01

## 2022-01-01 RX ORDER — DOBUTAMINE HYDROCHLORIDE 400 MG/100ML
INJECTION INTRAVENOUS CONTINUOUS
Status: DISCONTINUED | OUTPATIENT
Start: 2022-01-01 | End: 2022-01-01

## 2022-01-01 RX ORDER — KETOROLAC TROMETHAMINE 15 MG/ML
15 INJECTION, SOLUTION INTRAMUSCULAR; INTRAVENOUS EVERY 6 HOURS PRN
Status: DISCONTINUED | OUTPATIENT
Start: 2022-01-01 | End: 2022-01-01 | Stop reason: ALTCHOICE

## 2022-01-01 RX ORDER — HEPARIN SODIUM 1000 [USP'U]/ML
60 INJECTION, SOLUTION INTRAVENOUS; SUBCUTANEOUS ONCE
Status: COMPLETED | OUTPATIENT
Start: 2022-01-01 | End: 2022-01-01

## 2022-01-01 RX ORDER — METOPROLOL TARTRATE 5 MG/5ML
5 INJECTION INTRAVENOUS EVERY 6 HOURS
Status: DISCONTINUED | OUTPATIENT
Start: 2022-01-01 | End: 2022-01-01

## 2022-01-01 RX ORDER — POTASSIUM CHLORIDE 1.5 G/1.77G
20 POWDER, FOR SOLUTION ORAL ONCE
Status: COMPLETED | OUTPATIENT
Start: 2022-01-01 | End: 2022-01-01

## 2022-01-01 RX ORDER — ETOMIDATE 2 MG/ML
INJECTION INTRAVENOUS
Status: DISCONTINUED
Start: 2022-01-01 | End: 2022-01-01 | Stop reason: WASHOUT

## 2022-01-01 RX ORDER — CHLORHEXIDINE GLUCONATE 0.12 MG/ML
15 RINSE ORAL
Status: DISCONTINUED | OUTPATIENT
Start: 2022-01-01 | End: 2022-01-01

## 2022-01-01 RX ORDER — FUROSEMIDE 10 MG/ML
40 INJECTION INTRAMUSCULAR; INTRAVENOUS
Status: DISCONTINUED | OUTPATIENT
Start: 2022-01-01 | End: 2022-01-01

## 2022-01-01 RX ORDER — DEXTROSE MONOHYDRATE 25 G/50ML
50 INJECTION, SOLUTION INTRAVENOUS
Status: DISCONTINUED | OUTPATIENT
Start: 2022-01-01 | End: 2022-01-01

## 2022-01-01 RX ORDER — METHYLPREDNISOLONE SODIUM SUCCINATE 40 MG/ML
20 INJECTION, POWDER, LYOPHILIZED, FOR SOLUTION INTRAMUSCULAR; INTRAVENOUS EVERY 4 HOURS
Status: COMPLETED | OUTPATIENT
Start: 2022-01-01 | End: 2022-01-01

## 2022-01-01 RX ORDER — FUROSEMIDE 10 MG/ML
INJECTION INTRAMUSCULAR; INTRAVENOUS
Status: COMPLETED
Start: 2022-01-01 | End: 2022-01-01

## 2022-01-01 RX ORDER — SODIUM CHLORIDE 9 MG/ML
INJECTION, SOLUTION INTRAVENOUS
Status: COMPLETED | OUTPATIENT
Start: 2022-01-01 | End: 2022-01-01

## 2022-01-01 RX ORDER — BISACODYL 10 MG
10 SUPPOSITORY, RECTAL RECTAL ONCE
Status: COMPLETED | OUTPATIENT
Start: 2022-01-01 | End: 2022-01-01

## 2022-01-01 RX ADMIN — ACETAMINOPHEN 1000 MG: 10 INJECTION, SOLUTION INTRAVENOUS at 04:48:00

## 2022-01-01 RX ADMIN — POLYETHYLENE GLYCOL 3350 17 G: 17 POWDER, FOR SOLUTION ORAL at 11:34:00

## 2022-01-01 RX ADMIN — FUROSEMIDE 40 MG: 10 INJECTION INTRAMUSCULAR; INTRAVENOUS at 14:01:00

## 2022-01-01 RX ADMIN — BISACODYL 10 MG: 10 MG SUPPOSITORY, RECTAL RECTAL at 22:03:00

## 2022-01-01 RX ADMIN — DOBUTAMINE HYDROCHLORIDE 1 MCG/KG/MIN: 400 INJECTION INTRAVENOUS at 08:00:00

## 2022-01-01 RX ADMIN — CHLORHEXIDINE GLUCONATE 15 ML: 0.12 RINSE ORAL at 20:54:00

## 2022-01-01 RX ADMIN — METHYLPREDNISOLONE SODIUM SUCCINATE 20 MG: 40 INJECTION, POWDER, LYOPHILIZED, FOR SOLUTION INTRAMUSCULAR; INTRAVENOUS at 14:12:00

## 2022-01-01 RX ADMIN — CLOPIDOGREL BISULFATE 75 MG: 75 TABLET ORAL at 17:05:00

## 2022-01-01 RX ADMIN — AMIODARONE HYDROCHLORIDE 400 MG: 200 TABLET ORAL at 08:28:00

## 2022-01-01 RX ADMIN — DEXMEDETOMIDINE HYDROCHLORIDE 0.4 MCG/KG/HR: 4 INJECTION, SOLUTION INTRAVENOUS at 11:30:00

## 2022-01-01 RX ADMIN — CHLORHEXIDINE GLUCONATE 15 ML: 0.12 RINSE ORAL at 20:00:00

## 2022-01-01 RX ADMIN — DOBUTAMINE HYDROCHLORIDE 4 MCG/KG/MIN: 400 INJECTION INTRAVENOUS at 17:27:00

## 2022-01-01 RX ADMIN — METOPROLOL TARTRATE 2.5 MG: 5 INJECTION INTRAVENOUS at 09:00:00

## 2022-01-01 RX ADMIN — DOBUTAMINE HYDROCHLORIDE 5 MCG/KG/MIN: 400 INJECTION INTRAVENOUS at 03:01:00

## 2022-01-01 RX ADMIN — SODIUM CHLORIDE: 9 INJECTION, SOLUTION INTRAVENOUS at 15:30:00

## 2022-01-01 RX ADMIN — CLOPIDOGREL BISULFATE 75 MG: 75 TABLET ORAL at 08:14:00

## 2022-01-01 RX ADMIN — POTASSIUM CHLORIDE 20 MEQ: 1.5 POWDER, FOR SOLUTION ORAL at 16:36:00

## 2022-01-01 RX ADMIN — METOPROLOL TARTRATE 5 MG: 5 INJECTION INTRAVENOUS at 14:25:00

## 2022-01-01 RX ADMIN — DOBUTAMINE HYDROCHLORIDE 7.5 MCG/KG/MIN: 400 INJECTION INTRAVENOUS at 05:23:00

## 2022-01-01 RX ADMIN — DEXMEDETOMIDINE HYDROCHLORIDE 0.4 MCG/KG/HR: 4 INJECTION, SOLUTION INTRAVENOUS at 08:00:00

## 2022-01-01 RX ADMIN — CLOPIDOGREL BISULFATE 75 MG: 75 TABLET ORAL at 08:34:00

## 2022-01-01 RX ADMIN — BUMETANIDE 2 MG: 0.25 INJECTION, SOLUTION INTRAMUSCULAR; INTRAVENOUS at 23:20:00

## 2022-01-01 RX ADMIN — DEXMEDETOMIDINE HYDROCHLORIDE 0.8 MCG/KG/HR: 4 INJECTION, SOLUTION INTRAVENOUS at 16:40:00

## 2022-01-01 RX ADMIN — ACETAMINOPHEN 1000 MG: 10 INJECTION, SOLUTION INTRAVENOUS at 01:23:00

## 2022-01-01 RX ADMIN — DOBUTAMINE HYDROCHLORIDE 5 MCG/KG/MIN: 400 INJECTION INTRAVENOUS at 15:46:00

## 2022-01-01 RX ADMIN — Medication 100 MEQ: at 18:29:00

## 2022-01-01 RX ADMIN — CHLORHEXIDINE GLUCONATE 15 ML: 0.12 RINSE ORAL at 08:34:00

## 2022-01-01 RX ADMIN — DEXMEDETOMIDINE HYDROCHLORIDE 0.2 MCG/KG/HR: 4 INJECTION, SOLUTION INTRAVENOUS at 08:57:00

## 2022-01-01 RX ADMIN — ASPIRIN 81 MG: 81 TABLET, CHEWABLE ORAL at 17:05:00

## 2022-01-01 RX ADMIN — METHYLPREDNISOLONE SODIUM SUCCINATE 20 MG: 40 INJECTION, POWDER, LYOPHILIZED, FOR SOLUTION INTRAMUSCULAR; INTRAVENOUS at 10:07:00

## 2022-01-01 RX ADMIN — DEXMEDETOMIDINE HYDROCHLORIDE 0.2 MCG/KG/HR: 4 INJECTION, SOLUTION INTRAVENOUS at 06:17:00

## 2022-01-01 RX ADMIN — ASPIRIN 81 MG: 81 TABLET, CHEWABLE ORAL at 09:53:00

## 2022-01-01 RX ADMIN — CHLORHEXIDINE GLUCONATE 15 ML: 0.12 RINSE ORAL at 08:09:00

## 2022-01-01 RX ADMIN — ACETAMINOPHEN 1000 MG: 10 INJECTION, SOLUTION INTRAVENOUS at 07:45:00

## 2022-01-01 RX ADMIN — DOBUTAMINE HYDROCHLORIDE 3 MCG/KG/MIN: 400 INJECTION INTRAVENOUS at 22:00:00

## 2022-01-01 RX ADMIN — CHLORHEXIDINE GLUCONATE 15 ML: 0.12 RINSE ORAL at 20:19:00

## 2022-01-01 RX ADMIN — ASPIRIN 81 MG: 81 TABLET, CHEWABLE ORAL at 08:28:00

## 2022-01-01 RX ADMIN — DEXMEDETOMIDINE HYDROCHLORIDE 0.8 MCG/KG/HR: 4 INJECTION, SOLUTION INTRAVENOUS at 11:17:00

## 2022-01-01 RX ADMIN — CHLORHEXIDINE GLUCONATE 15 ML: 0.12 RINSE ORAL at 19:57:00

## 2022-01-01 RX ADMIN — CLOPIDOGREL BISULFATE 75 MG: 75 TABLET ORAL at 09:17:00

## 2022-01-01 RX ADMIN — CHLORHEXIDINE GLUCONATE 15 ML: 0.12 RINSE ORAL at 08:13:00

## 2022-01-01 RX ADMIN — POTASSIUM CHLORIDE 40 MEQ: 20 TABLET, EXTENDED RELEASE ORAL at 11:34:00

## 2022-01-01 RX ADMIN — ACETAMINOPHEN 1000 MG: 10 INJECTION, SOLUTION INTRAVENOUS at 19:56:00

## 2022-01-01 RX ADMIN — FUROSEMIDE 40 MG: 10 INJECTION INTRAMUSCULAR; INTRAVENOUS at 07:59:00

## 2022-01-01 RX ADMIN — AMIODARONE HYDROCHLORIDE 400 MG: 200 TABLET ORAL at 20:14:00

## 2022-01-01 RX ADMIN — ACETAMINOPHEN 1000 MG: 10 INJECTION, SOLUTION INTRAVENOUS at 23:25:00

## 2022-01-01 RX ADMIN — CHLORHEXIDINE GLUCONATE 15 ML: 0.12 RINSE ORAL at 09:17:00

## 2022-01-01 RX ADMIN — DEXMEDETOMIDINE HYDROCHLORIDE 0.2 MCG/KG/HR: 4 INJECTION, SOLUTION INTRAVENOUS at 11:00:00

## 2022-01-01 RX ADMIN — CALCIUM CHLORIDE 0.5 G: 100 INJECTION INTRAVENOUS; INTRAVENTRICULAR at 16:13:00

## 2022-01-01 RX ADMIN — CHLORHEXIDINE GLUCONATE 15 ML: 0.12 RINSE ORAL at 20:29:00

## 2022-01-01 RX ADMIN — DEXMEDETOMIDINE HYDROCHLORIDE 0.5 MCG/KG/HR: 4 INJECTION, SOLUTION INTRAVENOUS at 12:00:00

## 2022-01-01 RX ADMIN — DOBUTAMINE HYDROCHLORIDE 5 MCG/KG/MIN: 400 INJECTION INTRAVENOUS at 13:25:00

## 2022-01-01 RX ADMIN — HEPARIN SODIUM AND DEXTROSE 600 UNITS/HR: 10000; 5 INJECTION INTRAVENOUS at 07:26:00

## 2022-01-01 RX ADMIN — METHYLPREDNISOLONE SODIUM SUCCINATE 20 MG: 40 INJECTION, POWDER, LYOPHILIZED, FOR SOLUTION INTRAMUSCULAR; INTRAVENOUS at 18:04:00

## 2022-01-01 RX ADMIN — ASPIRIN 81 MG: 81 TABLET, CHEWABLE ORAL at 09:13:00

## 2022-01-01 RX ADMIN — ACETAMINOPHEN 1000 MG: 10 INJECTION, SOLUTION INTRAVENOUS at 03:50:00

## 2022-01-01 RX ADMIN — SODIUM BICARBONATE 150 MEQ/1,000 ML IN DEXTROSE 5 % INTRAVENOUS 84 ML/HR: SOLUTION at 18:36:00

## 2022-01-01 RX ADMIN — BUMETANIDE 0.5 MG: 0.25 INJECTION, SOLUTION INTRAMUSCULAR; INTRAVENOUS at 22:43:00

## 2022-01-01 RX ADMIN — POTASSIUM CHLORIDE 40 MEQ: 1.5 POWDER, FOR SOLUTION ORAL at 10:00:00

## 2022-01-01 RX ADMIN — DEXMEDETOMIDINE HYDROCHLORIDE 0.3 MCG/KG/HR: 4 INJECTION, SOLUTION INTRAVENOUS at 10:23:00

## 2022-01-01 RX ADMIN — HEPARIN SODIUM AND DEXTROSE 750 UNITS/HR: 10000; 5 INJECTION INTRAVENOUS at 05:13:00

## 2022-01-01 RX ADMIN — POTASSIUM CHLORIDE 20 MEQ: 1.5 POWDER, FOR SOLUTION ORAL at 09:24:00

## 2022-01-01 RX ADMIN — ASPIRIN 81 MG: 81 TABLET, CHEWABLE ORAL at 07:45:00

## 2022-01-01 RX ADMIN — CHLORHEXIDINE GLUCONATE 15 ML: 0.12 RINSE ORAL at 20:14:00

## 2022-01-01 RX ADMIN — BUMETANIDE 2 MG: 0.25 INJECTION, SOLUTION INTRAMUSCULAR; INTRAVENOUS at 09:17:00

## 2022-01-01 RX ADMIN — ACETAMINOPHEN 650 MG: 160 SOLUTION ORAL at 20:14:00

## 2022-01-01 RX ADMIN — CHLORHEXIDINE GLUCONATE 15 ML: 0.12 RINSE ORAL at 20:04:00

## 2022-01-01 RX ADMIN — CLOPIDOGREL BISULFATE 75 MG: 75 TABLET ORAL at 09:53:00

## 2022-01-01 RX ADMIN — CHLORHEXIDINE GLUCONATE 15 ML: 0.12 RINSE ORAL at 08:36:00

## 2022-01-01 RX ADMIN — DEXMEDETOMIDINE HYDROCHLORIDE 0.8 MCG/KG/HR: 4 INJECTION, SOLUTION INTRAVENOUS at 10:31:00

## 2022-01-01 RX ADMIN — ACETAMINOPHEN 1000 MG: 10 INJECTION, SOLUTION INTRAVENOUS at 15:51:00

## 2022-01-01 RX ADMIN — CLOPIDOGREL BISULFATE 75 MG: 75 TABLET ORAL at 08:28:00

## 2022-01-01 RX ADMIN — BUMETANIDE 2 MG: 0.25 INJECTION, SOLUTION INTRAMUSCULAR; INTRAVENOUS at 16:57:00

## 2022-01-01 RX ADMIN — HEPARIN SODIUM AND DEXTROSE 12 UNITS/KG/HR: 10000; 5 INJECTION INTRAVENOUS at 14:29:00

## 2022-01-01 RX ADMIN — ACETAMINOPHEN 1000 MG: 10 INJECTION, SOLUTION INTRAVENOUS at 22:45:00

## 2022-01-01 RX ADMIN — ACETAMINOPHEN 1000 MG: 10 INJECTION, SOLUTION INTRAVENOUS at 02:04:00

## 2022-01-01 RX ADMIN — CHLORHEXIDINE GLUCONATE 15 ML: 0.12 RINSE ORAL at 20:42:00

## 2022-01-01 RX ADMIN — BUMETANIDE 2 MG: 0.25 INJECTION, SOLUTION INTRAMUSCULAR; INTRAVENOUS at 10:04:00

## 2022-01-01 RX ADMIN — SODIUM CHLORIDE: 9 INJECTION, SOLUTION INTRAVENOUS at 10:10:00

## 2022-01-01 RX ADMIN — ASPIRIN 81 MG: 81 TABLET, CHEWABLE ORAL at 08:14:00

## 2022-01-01 RX ADMIN — CHLORHEXIDINE GLUCONATE 15 ML: 0.12 RINSE ORAL at 08:27:00

## 2022-01-01 RX ADMIN — VANCOMYCIN 2 GRAM/500 ML IN 0.9 % SODIUM CHLORIDE INTRAVENOUS 2000 MG: at 22:17:00

## 2022-01-01 RX ADMIN — POTASSIUM CHLORIDE 40 MEQ: 20 TABLET, EXTENDED RELEASE ORAL at 09:17:00

## 2022-01-01 RX ADMIN — POTASSIUM CHLORIDE 40 MEQ: 1.5 POWDER, FOR SOLUTION ORAL at 06:48:00

## 2022-01-01 RX ADMIN — CLOPIDOGREL BISULFATE 75 MG: 75 TABLET ORAL at 08:09:00

## 2022-01-01 RX ADMIN — FUROSEMIDE 40 MG: 10 INJECTION INTRAMUSCULAR; INTRAVENOUS at 09:26:00

## 2022-01-01 RX ADMIN — HEPARIN SODIUM AND DEXTROSE 1000 UNITS/HR: 10000; 5 INJECTION INTRAVENOUS at 18:32:00

## 2022-01-01 RX ADMIN — CLOPIDOGREL BISULFATE 75 MG: 75 TABLET ORAL at 08:36:00

## 2022-01-01 RX ADMIN — DEXMEDETOMIDINE HYDROCHLORIDE 0.8 MCG/KG/HR: 4 INJECTION, SOLUTION INTRAVENOUS at 15:53:00

## 2022-01-01 RX ADMIN — DEXMEDETOMIDINE HYDROCHLORIDE 0.8 MCG/KG/HR: 4 INJECTION, SOLUTION INTRAVENOUS at 08:44:00

## 2022-01-01 RX ADMIN — CHLORHEXIDINE GLUCONATE 15 ML: 0.12 RINSE ORAL at 09:26:00

## 2022-01-01 RX ADMIN — HEPARIN SODIUM AND DEXTROSE 750 UNITS/HR: 10000; 5 INJECTION INTRAVENOUS at 05:11:00

## 2022-01-01 RX ADMIN — DEXMEDETOMIDINE HYDROCHLORIDE 0.4 MCG/KG/HR: 4 INJECTION, SOLUTION INTRAVENOUS at 09:15:00

## 2022-01-01 RX ADMIN — DOBUTAMINE HYDROCHLORIDE 1 MCG/KG/MIN: 400 INJECTION INTRAVENOUS at 04:25:00

## 2022-01-01 RX ADMIN — DEXMEDETOMIDINE HYDROCHLORIDE 0.7 MCG/KG/HR: 4 INJECTION, SOLUTION INTRAVENOUS at 14:10:00

## 2022-01-01 RX ADMIN — DEXMEDETOMIDINE HYDROCHLORIDE 0.5 MCG/KG/HR: 4 INJECTION, SOLUTION INTRAVENOUS at 20:25:00

## 2022-01-01 RX ADMIN — ASPIRIN 81 MG: 81 TABLET, CHEWABLE ORAL at 08:34:00

## 2022-01-01 RX ADMIN — ACETAMINOPHEN 1000 MG: 10 INJECTION, SOLUTION INTRAVENOUS at 01:09:00

## 2022-01-01 RX ADMIN — CLOPIDOGREL BISULFATE 75 MG: 75 TABLET ORAL at 07:59:00

## 2022-01-01 RX ADMIN — ACETAMINOPHEN 1000 MG: 10 INJECTION, SOLUTION INTRAVENOUS at 04:02:00

## 2022-01-01 RX ADMIN — AMIODARONE HYDROCHLORIDE 400 MG: 200 TABLET ORAL at 08:00:00

## 2022-01-01 RX ADMIN — ASPIRIN 81 MG: 81 TABLET, CHEWABLE ORAL at 08:09:00

## 2022-01-01 RX ADMIN — DEXMEDETOMIDINE HYDROCHLORIDE 0.2 MCG/KG/HR: 4 INJECTION, SOLUTION INTRAVENOUS at 09:10:00

## 2022-01-01 RX ADMIN — DIGOXIN 125 MCG: 0.25 INJECTION INTRAMUSCULAR; INTRAVENOUS at 16:40:00

## 2022-01-01 RX ADMIN — CHLORHEXIDINE GLUCONATE 15 ML: 0.12 RINSE ORAL at 09:15:00

## 2022-01-01 RX ADMIN — CHLORHEXIDINE GLUCONATE 15 ML: 0.12 RINSE ORAL at 07:59:00

## 2022-01-01 RX ADMIN — DOBUTAMINE HYDROCHLORIDE 4 MCG/KG/MIN: 400 INJECTION INTRAVENOUS at 16:00:00

## 2022-01-01 RX ADMIN — DEXMEDETOMIDINE HYDROCHLORIDE 0.8 MCG/KG/HR: 4 INJECTION, SOLUTION INTRAVENOUS at 10:05:00

## 2022-01-01 RX ADMIN — BUMETANIDE 2 MG: 0.25 INJECTION, SOLUTION INTRAMUSCULAR; INTRAVENOUS at 08:55:00

## 2022-01-01 RX ADMIN — ACETAMINOPHEN 1000 MG: 10 INJECTION, SOLUTION INTRAVENOUS at 20:25:00

## 2022-01-01 RX ADMIN — HEPARIN SODIUM AND DEXTROSE 1000 UNITS/HR: 10000; 5 INJECTION INTRAVENOUS at 21:00:00

## 2022-01-01 RX ADMIN — DEXMEDETOMIDINE HYDROCHLORIDE 0.2 MCG/KG/HR: 4 INJECTION, SOLUTION INTRAVENOUS at 11:48:00

## 2022-01-01 RX ADMIN — CALCIUM CHLORIDE 0.2 G: 100 INJECTION INTRAVENOUS; INTRAVENTRICULAR at 17:10:00

## 2022-01-01 RX ADMIN — POTASSIUM CHLORIDE 40 MEQ: 20 TABLET, EXTENDED RELEASE ORAL at 10:04:00

## 2022-01-01 RX ADMIN — BUMETANIDE 2 MG: 0.25 INJECTION, SOLUTION INTRAMUSCULAR; INTRAVENOUS at 17:05:00

## 2022-01-01 RX ADMIN — CHLORHEXIDINE GLUCONATE 15 ML: 0.12 RINSE ORAL at 21:04:00

## 2022-01-01 RX ADMIN — ASPIRIN 81 MG: 81 TABLET, CHEWABLE ORAL at 07:51:00

## 2022-01-01 RX ADMIN — HEPARIN SODIUM 4800 UNITS: 1000 INJECTION, SOLUTION INTRAVENOUS; SUBCUTANEOUS at 14:26:00

## 2022-01-01 RX ADMIN — POTASSIUM CHLORIDE 20 MEQ: 14.9 INJECTION INTRAVENOUS at 05:37:00

## 2022-01-01 RX ADMIN — DEXMEDETOMIDINE HYDROCHLORIDE 1 MCG/KG/HR: 4 INJECTION, SOLUTION INTRAVENOUS at 09:20:00

## 2022-01-01 RX ADMIN — CHLORHEXIDINE GLUCONATE 15 ML: 0.12 RINSE ORAL at 19:49:00

## 2022-01-01 RX ADMIN — BUMETANIDE 2 MG: 0.25 INJECTION, SOLUTION INTRAMUSCULAR; INTRAVENOUS at 09:58:00

## 2022-01-01 RX ADMIN — CLOPIDOGREL BISULFATE 75 MG: 75 TABLET ORAL at 07:45:00

## 2022-01-01 RX ADMIN — DEXMEDETOMIDINE HYDROCHLORIDE 0.8 MCG/KG/HR: 4 INJECTION, SOLUTION INTRAVENOUS at 10:04:00

## 2022-01-01 RX ADMIN — BUMETANIDE 2 MG: 0.25 INJECTION, SOLUTION INTRAMUSCULAR; INTRAVENOUS at 16:56:00

## 2022-01-01 RX ADMIN — HEPARIN SODIUM AND DEXTROSE 750 UNITS/HR: 10000; 5 INJECTION INTRAVENOUS at 20:19:00

## 2022-01-01 RX ADMIN — DEXMEDETOMIDINE HYDROCHLORIDE 0.8 MCG/KG/HR: 4 INJECTION, SOLUTION INTRAVENOUS at 03:55:00

## 2022-01-01 RX ADMIN — CLOPIDOGREL BISULFATE 75 MG: 75 TABLET ORAL at 08:00:00

## 2022-01-01 RX ADMIN — ACETAMINOPHEN 1000 MG: 10 INJECTION, SOLUTION INTRAVENOUS at 19:07:00

## 2022-01-01 RX ADMIN — BUMETANIDE 2 MG: 0.25 INJECTION, SOLUTION INTRAMUSCULAR; INTRAVENOUS at 10:42:00

## 2022-01-01 RX ADMIN — BUMETANIDE 2 MG: 0.25 INJECTION, SOLUTION INTRAMUSCULAR; INTRAVENOUS at 09:14:00

## 2022-01-01 RX ADMIN — METOPROLOL TARTRATE 2.5 MG: 5 INJECTION INTRAVENOUS at 08:43:00

## 2022-01-01 RX ADMIN — BUMETANIDE 2 MG: 0.25 INJECTION, SOLUTION INTRAMUSCULAR; INTRAVENOUS at 08:01:00

## 2022-01-01 RX ADMIN — FUROSEMIDE 40 MG: 10 INJECTION INTRAMUSCULAR; INTRAVENOUS at 06:37:00

## 2022-01-01 RX ADMIN — DEXMEDETOMIDINE HYDROCHLORIDE 0.3 MCG/KG/HR: 4 INJECTION, SOLUTION INTRAVENOUS at 21:25:00

## 2022-01-01 RX ADMIN — AMIODARONE HYDROCHLORIDE 400 MG: 200 TABLET ORAL at 07:51:00

## 2022-01-01 RX ADMIN — SODIUM CHLORIDE: 9 INJECTION, SOLUTION INTRAVENOUS at 19:49:00

## 2022-01-01 RX ADMIN — DEXMEDETOMIDINE HYDROCHLORIDE 1 MCG/KG/HR: 4 INJECTION, SOLUTION INTRAVENOUS at 15:53:00

## 2022-01-01 RX ADMIN — DEXMEDETOMIDINE HYDROCHLORIDE 0.8 MCG/KG/HR: 4 INJECTION, SOLUTION INTRAVENOUS at 05:43:00

## 2022-01-01 RX ADMIN — HEPARIN SODIUM AND DEXTROSE 750 UNITS/HR: 10000; 5 INJECTION INTRAVENOUS at 03:00:00

## 2022-01-01 RX ADMIN — HEPARIN SODIUM 2000 UNITS: 5000 INJECTION, SOLUTION INTRAVENOUS; SUBCUTANEOUS at 16:28:00

## 2022-01-01 RX ADMIN — BUMETANIDE 2 MG: 0.25 INJECTION, SOLUTION INTRAMUSCULAR; INTRAVENOUS at 16:54:00

## 2022-01-01 RX ADMIN — HEPARIN SODIUM AND DEXTROSE 800 UNITS/HR: 10000; 5 INJECTION INTRAVENOUS at 22:17:00

## 2022-01-01 RX ADMIN — DOBUTAMINE HYDROCHLORIDE 2 MCG/KG/MIN: 400 INJECTION INTRAVENOUS at 05:00:00

## 2022-01-01 RX ADMIN — FUROSEMIDE 40 MG: 10 INJECTION INTRAMUSCULAR; INTRAVENOUS at 08:48:00

## 2022-01-01 RX ADMIN — DOBUTAMINE HYDROCHLORIDE 5 MCG/KG/MIN: 200 INJECTION INTRAVENOUS at 18:29:00

## 2022-01-01 RX ADMIN — CHLORHEXIDINE GLUCONATE 15 ML: 0.12 RINSE ORAL at 21:45:00

## 2022-01-01 RX ADMIN — ACETAMINOPHEN 1000 MG: 10 INJECTION, SOLUTION INTRAVENOUS at 16:35:00

## 2022-01-01 RX ADMIN — DOBUTAMINE HYDROCHLORIDE 5 MCG/KG/MIN: 200 INJECTION INTRAVENOUS at 10:54:00

## 2022-01-01 RX ADMIN — DOBUTAMINE HYDROCHLORIDE 2 MCG/KG/MIN: 400 INJECTION INTRAVENOUS at 23:00:00

## 2022-01-01 RX ADMIN — ACETAMINOPHEN 1000 MG: 10 INJECTION, SOLUTION INTRAVENOUS at 06:33:00

## 2022-01-01 RX ADMIN — CHLORHEXIDINE GLUCONATE 15 ML: 0.12 RINSE ORAL at 21:20:00

## 2022-01-01 RX ADMIN — ACETAMINOPHEN 1000 MG: 10 INJECTION, SOLUTION INTRAVENOUS at 16:10:00

## 2022-01-01 RX ADMIN — FUROSEMIDE 40 MG: 10 INJECTION INTRAMUSCULAR; INTRAVENOUS at 18:32:00

## 2022-01-01 RX ADMIN — DEXMEDETOMIDINE HYDROCHLORIDE 0.6 MCG/KG/HR: 4 INJECTION, SOLUTION INTRAVENOUS at 08:05:00

## 2022-01-01 RX ADMIN — POLYETHYLENE GLYCOL 3350 17 G: 17 POWDER, FOR SOLUTION ORAL at 07:59:00

## 2022-01-01 RX ADMIN — HEPARIN SODIUM 5000 UNITS: 5000 INJECTION, SOLUTION INTRAVENOUS; SUBCUTANEOUS at 16:12:00

## 2022-01-01 RX ADMIN — CHLORHEXIDINE GLUCONATE 15 ML: 0.12 RINSE ORAL at 07:45:00

## 2022-01-01 RX ADMIN — CHLORHEXIDINE GLUCONATE 15 ML: 0.12 RINSE ORAL at 19:55:00

## 2022-01-01 RX ADMIN — DEXMEDETOMIDINE HYDROCHLORIDE 1 MCG/KG/HR: 4 INJECTION, SOLUTION INTRAVENOUS at 10:06:00

## 2022-01-01 RX ADMIN — DEXMEDETOMIDINE HYDROCHLORIDE 1.2 MCG/KG/HR: 4 INJECTION, SOLUTION INTRAVENOUS at 16:03:00

## 2022-01-01 RX ADMIN — HEPARIN SODIUM AND DEXTROSE 600 UNITS/HR: 10000; 5 INJECTION INTRAVENOUS at 23:02:00

## 2022-01-01 RX ADMIN — HEPARIN SODIUM AND DEXTROSE 600 UNITS/HR: 10000; 5 INJECTION INTRAVENOUS at 09:58:00

## 2022-01-01 RX ADMIN — PHENYLEPHRINE HCL IN 0.9% NACL 50 MCG/MIN: 50MG/250ML PLASTIC BAG, INJECTION (ML) INTRAVENOUS at 18:58:00

## 2022-01-01 RX ADMIN — ASPIRIN 81 MG: 81 TABLET, CHEWABLE ORAL at 07:59:00

## 2022-01-01 RX ADMIN — FUROSEMIDE 40 MG: 10 INJECTION INTRAMUSCULAR; INTRAVENOUS at 07:45:00

## 2022-01-01 RX ADMIN — DEXMEDETOMIDINE HYDROCHLORIDE 0.2 MCG/KG/HR: 4 INJECTION, SOLUTION INTRAVENOUS at 19:48:00

## 2022-01-01 RX ADMIN — DEXMEDETOMIDINE HYDROCHLORIDE 0.8 MCG/KG/HR: 4 INJECTION, SOLUTION INTRAVENOUS at 21:48:00

## 2022-01-01 RX ADMIN — ACETAMINOPHEN 1000 MG: 10 INJECTION, SOLUTION INTRAVENOUS at 10:05:00

## 2022-01-01 RX ADMIN — HEPARIN SODIUM 2000 UNITS: 5000 INJECTION, SOLUTION INTRAVENOUS; SUBCUTANEOUS at 17:06:00

## 2022-01-01 RX ADMIN — FUROSEMIDE 40 MG: 10 INJECTION INTRAMUSCULAR; INTRAVENOUS at 08:34:00

## 2022-01-01 RX ADMIN — DEXMEDETOMIDINE HYDROCHLORIDE 0.6 MCG/KG/HR: 4 INJECTION, SOLUTION INTRAVENOUS at 17:11:00

## 2022-01-01 RX ADMIN — FUROSEMIDE 40 MG: 10 INJECTION INTRAMUSCULAR; INTRAVENOUS at 16:51:00

## 2022-01-01 RX ADMIN — ACETAMINOPHEN 1000 MG: 10 INJECTION, SOLUTION INTRAVENOUS at 18:36:00

## 2022-01-01 RX ADMIN — DOBUTAMINE HYDROCHLORIDE 6 MCG/KG/MIN: 400 INJECTION INTRAVENOUS at 12:00:00

## 2022-01-01 RX ADMIN — CHLORHEXIDINE GLUCONATE 15 ML: 0.12 RINSE ORAL at 09:53:00

## 2022-01-01 RX ADMIN — HEPARIN SODIUM AND DEXTROSE 800 UNITS/HR: 10000; 5 INJECTION INTRAVENOUS at 02:22:00

## 2022-01-01 RX ADMIN — METHYLPREDNISOLONE SODIUM SUCCINATE 20 MG: 40 INJECTION, POWDER, LYOPHILIZED, FOR SOLUTION INTRAMUSCULAR; INTRAVENOUS at 22:30:00

## 2022-01-01 RX ADMIN — SODIUM BICARBONATE 150 MEQ/1,000 ML IN DEXTROSE 5 % INTRAVENOUS 83 ML/HR: SOLUTION at 06:08:00

## 2022-01-01 RX ADMIN — AMIODARONE HYDROCHLORIDE 400 MG: 200 TABLET ORAL at 16:00:00

## 2022-01-01 RX ADMIN — CHLORHEXIDINE GLUCONATE 15 ML: 0.12 RINSE ORAL at 08:00:00

## 2022-01-01 RX ADMIN — BUMETANIDE 2 MG: 0.25 INJECTION, SOLUTION INTRAMUSCULAR; INTRAVENOUS at 17:10:00

## 2022-01-01 RX ADMIN — ASPIRIN 81 MG: 81 TABLET, CHEWABLE ORAL at 08:36:00

## 2022-01-01 RX ADMIN — ASPIRIN 81 MG: 81 TABLET, CHEWABLE ORAL at 08:00:00

## 2022-01-01 RX ADMIN — DOBUTAMINE HYDROCHLORIDE 5 MCG/KG/MIN: 200 INJECTION INTRAVENOUS at 18:58:00

## 2022-01-01 RX ADMIN — METOPROLOL TARTRATE 2.5 MG: 5 INJECTION INTRAVENOUS at 15:27:00

## 2022-01-01 RX ADMIN — POTASSIUM CHLORIDE 20 MEQ: 14.9 INJECTION INTRAVENOUS at 09:26:00

## 2022-01-01 RX ADMIN — SODIUM BICARBONATE 150 MEQ/1,000 ML IN DEXTROSE 5 % INTRAVENOUS 83 ML/HR: SOLUTION at 22:00:00

## 2022-01-01 RX ADMIN — DEXMEDETOMIDINE HYDROCHLORIDE 0.8 MCG/KG/HR: 4 INJECTION, SOLUTION INTRAVENOUS at 15:16:00

## 2022-01-01 RX ADMIN — DEXMEDETOMIDINE HYDROCHLORIDE 0.8 MCG/KG/HR: 4 INJECTION, SOLUTION INTRAVENOUS at 23:05:00

## 2022-01-01 RX ADMIN — HEPARIN SODIUM AND DEXTROSE 800 UNITS/HR: 10000; 5 INJECTION INTRAVENOUS at 20:11:00

## 2022-01-01 RX ADMIN — DOBUTAMINE HYDROCHLORIDE 1 MCG/KG/MIN: 400 INJECTION INTRAVENOUS at 00:00:00

## 2022-01-01 RX ADMIN — DOBUTAMINE HYDROCHLORIDE 7 MCG/KG/MIN: 400 INJECTION INTRAVENOUS at 08:15:00

## 2022-01-01 RX ADMIN — CLOPIDOGREL BISULFATE 75 MG: 75 TABLET ORAL at 09:13:00

## 2022-01-01 RX ADMIN — POTASSIUM CHLORIDE 40 MEQ: 1.5 POWDER, FOR SOLUTION ORAL at 13:16:00

## 2022-01-01 RX ADMIN — ASPIRIN 81 MG: 81 TABLET, CHEWABLE ORAL at 09:17:00

## 2022-01-01 RX ADMIN — CHLORHEXIDINE GLUCONATE 15 ML: 0.12 RINSE ORAL at 07:51:00

## 2022-01-01 RX ADMIN — AMIODARONE HYDROCHLORIDE 400 MG: 200 TABLET ORAL at 09:17:00

## 2022-01-01 RX ADMIN — MIDAZOLAM HYDROCHLORIDE 1 MG: 1 INJECTION INTRAMUSCULAR; INTRAVENOUS at 16:00:00

## 2022-01-01 RX ADMIN — CLOPIDOGREL BISULFATE 75 MG: 75 TABLET ORAL at 07:51:00

## 2022-01-01 RX ADMIN — DEXMEDETOMIDINE HYDROCHLORIDE 0.4 MCG/KG/HR: 4 INJECTION, SOLUTION INTRAVENOUS at 11:11:00

## 2022-01-01 RX ADMIN — HEPARIN SODIUM 5000 UNITS: 5000 INJECTION, SOLUTION INTRAVENOUS; SUBCUTANEOUS at 15:49:00

## 2022-01-01 RX ADMIN — DEXMEDETOMIDINE HYDROCHLORIDE 0.7 MCG/KG/HR: 4 INJECTION, SOLUTION INTRAVENOUS at 09:45:00

## 2022-01-01 RX ADMIN — HEPARIN SODIUM AND DEXTROSE 750 UNITS/HR: 10000; 5 INJECTION INTRAVENOUS at 12:25:00

## 2022-01-01 RX ADMIN — BUMETANIDE 2 MG: 0.25 INJECTION, SOLUTION INTRAMUSCULAR; INTRAVENOUS at 17:06:00

## 2022-01-01 RX ADMIN — BUMETANIDE 2 MG: 0.25 INJECTION, SOLUTION INTRAMUSCULAR; INTRAVENOUS at 08:28:00

## 2022-01-01 RX ADMIN — ACETAMINOPHEN 650 MG: 160 SOLUTION ORAL at 13:46:00

## 2022-01-10 ENCOUNTER — LAB ENCOUNTER (OUTPATIENT)
Dept: LAB | Age: 57
End: 2022-01-10
Attending: INTERNAL MEDICINE
Payer: COMMERCIAL

## 2022-01-10 DIAGNOSIS — I25.82 CHRONIC TOTAL OCCLUSION OF NATIVE CORONARY ARTERY: ICD-10-CM

## 2022-01-10 DIAGNOSIS — I25.10 CHRONIC TOTAL OCCLUSION OF NATIVE CORONARY ARTERY: ICD-10-CM

## 2022-01-10 LAB
ANION GAP SERPL CALC-SCNC: 4 MMOL/L (ref 0–18)
BASOPHILS # BLD AUTO: 0.05 X10(3) UL (ref 0–0.2)
BASOPHILS NFR BLD AUTO: 0.7 %
BUN BLD-MCNC: 12 MG/DL (ref 7–18)
BUN/CREAT SERPL: 13.3 (ref 10–20)
CALCIUM BLD-MCNC: 9.6 MG/DL (ref 8.5–10.1)
CHLORIDE SERPL-SCNC: 107 MMOL/L (ref 98–112)
CO2 SERPL-SCNC: 30 MMOL/L (ref 21–32)
CREAT BLD-MCNC: 0.9 MG/DL
DEPRECATED RDW RBC AUTO: 38.5 FL (ref 35.1–46.3)
EOSINOPHIL # BLD AUTO: 0.23 X10(3) UL (ref 0–0.7)
EOSINOPHIL NFR BLD AUTO: 3.1 %
ERYTHROCYTE [DISTWIDTH] IN BLOOD BY AUTOMATED COUNT: 11.8 % (ref 11–15)
FASTING STATUS PATIENT QL REPORTED: YES
GLUCOSE BLD-MCNC: 192 MG/DL (ref 70–99)
HCT VFR BLD AUTO: 42.3 %
HGB BLD-MCNC: 15 G/DL
IMM GRANULOCYTES # BLD AUTO: 0.01 X10(3) UL (ref 0–1)
IMM GRANULOCYTES NFR BLD: 0.1 %
LYMPHOCYTES # BLD AUTO: 3.43 X10(3) UL (ref 1–4)
LYMPHOCYTES NFR BLD AUTO: 45.9 %
MCH RBC QN AUTO: 32 PG (ref 26–34)
MCHC RBC AUTO-ENTMCNC: 35.5 G/DL (ref 31–37)
MCV RBC AUTO: 90.2 FL
MONOCYTES # BLD AUTO: 0.51 X10(3) UL (ref 0.1–1)
MONOCYTES NFR BLD AUTO: 6.8 %
NEUTROPHILS # BLD AUTO: 3.24 X10 (3) UL (ref 1.5–7.7)
NEUTROPHILS # BLD AUTO: 3.24 X10(3) UL (ref 1.5–7.7)
NEUTROPHILS NFR BLD AUTO: 43.4 %
OSMOLALITY SERPL CALC.SUM OF ELEC: 297 MOSM/KG (ref 275–295)
PLATELET # BLD AUTO: 191 10(3)UL (ref 150–450)
POTASSIUM SERPL-SCNC: 4.1 MMOL/L (ref 3.5–5.1)
RBC # BLD AUTO: 4.69 X10(6)UL
SODIUM SERPL-SCNC: 141 MMOL/L (ref 136–145)
WBC # BLD AUTO: 7.5 X10(3) UL (ref 4–11)

## 2022-01-10 PROCEDURE — 80048 BASIC METABOLIC PNL TOTAL CA: CPT

## 2022-01-10 PROCEDURE — 85025 COMPLETE CBC W/AUTO DIFF WBC: CPT

## 2022-01-10 PROCEDURE — 36415 COLL VENOUS BLD VENIPUNCTURE: CPT

## 2022-01-11 VITALS — BODY MASS INDEX: 27.31 KG/M2 | WEIGHT: 160 LBS | HEIGHT: 64 IN

## 2022-01-11 LAB — SARS-COV-2 RNA RESP QL NAA+PROBE: NOT DETECTED

## 2022-01-12 NOTE — PAT NURSING NOTE
PAT note: case for tomorrow was cancelled by Dr. Waqar Finch. Patient was upset. He stated he is not feeling well and has chest tightness especially after eating. I STRONGLY encouraged the patient to go to the nearest ER to seek medical care. He v/u.

## 2022-01-13 ENCOUNTER — HOSPITAL ENCOUNTER (OUTPATIENT)
Dept: INTERVENTIONAL RADIOLOGY/VASCULAR | Facility: HOSPITAL | Age: 57
Discharge: HOME | End: 2022-01-13
Attending: INTERNAL MEDICINE
Payer: COMMERCIAL

## 2022-01-13 DIAGNOSIS — I25.10 CHRONIC TOTAL OCCLUSION OF NATIVE CORONARY ARTERY: Primary | ICD-10-CM

## 2022-01-13 DIAGNOSIS — I25.82 CHRONIC TOTAL OCCLUSION OF NATIVE CORONARY ARTERY: Primary | ICD-10-CM

## 2022-01-17 ENCOUNTER — ANESTHESIA EVENT (OUTPATIENT)
Dept: INTERVENTIONAL RADIOLOGY/VASCULAR | Facility: HOSPITAL | Age: 57
DRG: 217 | End: 2022-01-17
Payer: COMMERCIAL

## 2022-01-17 ENCOUNTER — LAB ENCOUNTER (OUTPATIENT)
Dept: LAB | Facility: HOSPITAL | Age: 57
End: 2022-01-17
Attending: INTERNAL MEDICINE
Payer: COMMERCIAL

## 2022-01-17 DIAGNOSIS — I25.10 CAD (CORONARY ARTERY DISEASE): ICD-10-CM

## 2022-01-17 LAB — SARS-COV-2 RNA RESP QL NAA+PROBE: NOT DETECTED

## 2022-01-18 ENCOUNTER — ANESTHESIA (OUTPATIENT)
Dept: INTERVENTIONAL RADIOLOGY/VASCULAR | Facility: HOSPITAL | Age: 57
DRG: 217 | End: 2022-01-18
Payer: COMMERCIAL

## 2022-01-18 ENCOUNTER — HOSPITAL ENCOUNTER (INPATIENT)
Dept: INTERVENTIONAL RADIOLOGY/VASCULAR | Facility: HOSPITAL | Age: 57
LOS: 3 days | Discharge: HOME | DRG: 217 | End: 2022-01-21
Attending: INTERNAL MEDICINE | Admitting: INTERNAL MEDICINE
Payer: COMMERCIAL

## 2022-01-18 DIAGNOSIS — I25.10 CHRONIC TOTAL OCCLUSION OF NATIVE CORONARY ARTERY: ICD-10-CM

## 2022-01-18 DIAGNOSIS — I25.10 CAD (CORONARY ARTERY DISEASE): Primary | ICD-10-CM

## 2022-01-18 DIAGNOSIS — I25.82 CHRONIC TOTAL OCCLUSION OF NATIVE CORONARY ARTERY: ICD-10-CM

## 2022-01-18 LAB
GLUCOSE BLD-MCNC: 113 MG/DL (ref 70–99)
GLUCOSE BLD-MCNC: 207 MG/DL (ref 70–99)
GLUCOSE BLD-MCNC: 229 MG/DL (ref 70–99)
ISTAT ACTIVATED CLOTTING TIME: 148 SECONDS (ref 74–137)
ISTAT ACTIVATED CLOTTING TIME: 261 SECONDS (ref 74–137)
ISTAT ACTIVATED CLOTTING TIME: 267 SECONDS (ref 74–137)
ISTAT ACTIVATED CLOTTING TIME: 279 SECONDS (ref 74–137)
ISTAT ACTIVATED CLOTTING TIME: 297 SECONDS (ref 74–137)
ISTAT ACTIVATED CLOTTING TIME: 297 SECONDS (ref 74–137)

## 2022-01-18 PROCEDURE — 027337Z DILATION OF CORONARY ARTERY, FOUR OR MORE ARTERIES WITH FOUR OR MORE DRUG-ELUTING INTRALUMINAL DEVICES, PERCUTANEOUS APPROACH: ICD-10-PCS | Performed by: INTERNAL MEDICINE

## 2022-01-18 PROCEDURE — B212YZZ FLUOROSCOPY OF SINGLE CORONARY ARTERY BYPASS GRAFT USING OTHER CONTRAST: ICD-10-PCS | Performed by: INTERNAL MEDICINE

## 2022-01-18 PROCEDURE — B211YZZ FLUOROSCOPY OF MULTIPLE CORONARY ARTERIES USING OTHER CONTRAST: ICD-10-PCS | Performed by: INTERNAL MEDICINE

## 2022-01-18 PROCEDURE — 99253 IP/OBS CNSLTJ NEW/EST LOW 45: CPT | Performed by: HOSPITALIST

## 2022-01-18 PROCEDURE — B218YZZ FLUOROSCOPY OF LEFT INTERNAL MAMMARY BYPASS GRAFT USING OTHER CONTRAST: ICD-10-PCS | Performed by: INTERNAL MEDICINE

## 2022-01-18 PROCEDURE — 02HA3RJ INSERTION OF SHORT-TERM EXTERNAL HEART ASSIST SYSTEM INTO HEART, INTRAOPERATIVE, PERCUTANEOUS APPROACH: ICD-10-PCS | Performed by: INTERNAL MEDICINE

## 2022-01-18 PROCEDURE — 5A0221D ASSISTANCE WITH CARDIAC OUTPUT USING IMPELLER PUMP, CONTINUOUS: ICD-10-PCS | Performed by: INTERNAL MEDICINE

## 2022-01-18 PROCEDURE — B241ZZ3 ULTRASONOGRAPHY OF MULTIPLE CORONARY ARTERIES, INTRAVASCULAR: ICD-10-PCS | Performed by: INTERNAL MEDICINE

## 2022-01-18 PROCEDURE — 02F03ZZ FRAGMENTATION IN CORONARY ARTERY, ONE ARTERY, PERCUTANEOUS APPROACH: ICD-10-PCS | Performed by: INTERNAL MEDICINE

## 2022-01-18 PROCEDURE — 4A023N7 MEASUREMENT OF CARDIAC SAMPLING AND PRESSURE, LEFT HEART, PERCUTANEOUS APPROACH: ICD-10-PCS | Performed by: INTERNAL MEDICINE

## 2022-01-18 RX ORDER — CLOPIDOGREL BISULFATE 75 MG/1
75 TABLET ORAL DAILY
Status: DISCONTINUED | OUTPATIENT
Start: 2022-01-19 | End: 2022-01-21

## 2022-01-18 RX ORDER — LIDOCAINE HYDROCHLORIDE 10 MG/ML
INJECTION, SOLUTION EPIDURAL; INFILTRATION; INTRACAUDAL; PERINEURAL AS NEEDED
Status: DISCONTINUED | OUTPATIENT
Start: 2022-01-18 | End: 2022-01-18 | Stop reason: SURG

## 2022-01-18 RX ORDER — ATORVASTATIN CALCIUM 80 MG/1
80 TABLET, FILM COATED ORAL NIGHTLY
Status: DISCONTINUED | OUTPATIENT
Start: 2022-01-18 | End: 2022-01-21

## 2022-01-18 RX ORDER — MORPHINE SULFATE 2 MG/ML
1 INJECTION, SOLUTION INTRAMUSCULAR; INTRAVENOUS EVERY 2 HOUR PRN
Status: DISCONTINUED | OUTPATIENT
Start: 2022-01-18 | End: 2022-01-21

## 2022-01-18 RX ORDER — SODIUM CHLORIDE 9 MG/ML
INJECTION, SOLUTION INTRAVENOUS
Status: COMPLETED | OUTPATIENT
Start: 2022-01-19 | End: 2022-01-18

## 2022-01-18 RX ORDER — DEXAMETHASONE SODIUM PHOSPHATE 4 MG/ML
VIAL (ML) INJECTION AS NEEDED
Status: DISCONTINUED | OUTPATIENT
Start: 2022-01-18 | End: 2022-01-18 | Stop reason: SURG

## 2022-01-18 RX ORDER — SODIUM CHLORIDE 9 MG/ML
INJECTION, SOLUTION INTRAVENOUS CONTINUOUS
Status: ACTIVE | OUTPATIENT
Start: 2022-01-18 | End: 2022-01-18

## 2022-01-18 RX ORDER — ONDANSETRON 2 MG/ML
INJECTION INTRAMUSCULAR; INTRAVENOUS AS NEEDED
Status: DISCONTINUED | OUTPATIENT
Start: 2022-01-18 | End: 2022-01-18 | Stop reason: SURG

## 2022-01-18 RX ORDER — ASPIRIN 81 MG/1
81 TABLET ORAL DAILY
Status: DISCONTINUED | OUTPATIENT
Start: 2022-01-19 | End: 2022-01-21

## 2022-01-18 RX ORDER — HEPARIN SODIUM 5000 [USP'U]/ML
INJECTION, SOLUTION INTRAVENOUS; SUBCUTANEOUS
Status: COMPLETED
Start: 2022-01-18 | End: 2022-01-18

## 2022-01-18 RX ORDER — METOPROLOL TARTRATE 5 MG/5ML
5 INJECTION INTRAVENOUS ONCE
Status: COMPLETED | OUTPATIENT
Start: 2022-01-18 | End: 2022-01-18

## 2022-01-18 RX ORDER — DOBUTAMINE HYDROCHLORIDE 200 MG/100ML
INJECTION INTRAVENOUS CONTINUOUS PRN
Status: DISCONTINUED | OUTPATIENT
Start: 2022-01-18 | End: 2022-01-18 | Stop reason: SURG

## 2022-01-18 RX ORDER — ROCURONIUM BROMIDE 10 MG/ML
INJECTION, SOLUTION INTRAVENOUS AS NEEDED
Status: DISCONTINUED | OUTPATIENT
Start: 2022-01-18 | End: 2022-01-18 | Stop reason: SURG

## 2022-01-18 RX ORDER — NITROGLYCERIN 20 MG/100ML
INJECTION INTRAVENOUS
Status: COMPLETED
Start: 2022-01-18 | End: 2022-01-18

## 2022-01-18 RX ORDER — HEPARIN SODIUM 1000 [USP'U]/ML
INJECTION, SOLUTION INTRAVENOUS; SUBCUTANEOUS AS NEEDED
Status: DISCONTINUED | OUTPATIENT
Start: 2022-01-18 | End: 2022-01-18 | Stop reason: SURG

## 2022-01-18 RX ORDER — LIDOCAINE HYDROCHLORIDE 10 MG/ML
INJECTION, SOLUTION EPIDURAL; INFILTRATION; INTRACAUDAL; PERINEURAL
Status: COMPLETED
Start: 2022-01-18 | End: 2022-01-18

## 2022-01-18 RX ORDER — SODIUM CHLORIDE 9 MG/ML
INJECTION, SOLUTION INTRAVENOUS CONTINUOUS PRN
Status: DISCONTINUED | OUTPATIENT
Start: 2022-01-18 | End: 2022-01-18 | Stop reason: SURG

## 2022-01-18 RX ORDER — MORPHINE SULFATE 4 MG/ML
4 INJECTION, SOLUTION INTRAMUSCULAR; INTRAVENOUS EVERY 2 HOUR PRN
Status: DISCONTINUED | OUTPATIENT
Start: 2022-01-18 | End: 2022-01-21

## 2022-01-18 RX ORDER — DEXTROSE MONOHYDRATE 25 G/50ML
50 INJECTION, SOLUTION INTRAVENOUS
Status: DISCONTINUED | OUTPATIENT
Start: 2022-01-18 | End: 2022-01-21

## 2022-01-18 RX ORDER — CLOPIDOGREL BISULFATE 75 MG/1
300 TABLET ORAL ONCE
Status: COMPLETED | OUTPATIENT
Start: 2022-01-18 | End: 2022-01-18

## 2022-01-18 RX ORDER — NITROGLYCERIN 20 MG/100ML
INJECTION INTRAVENOUS CONTINUOUS
Status: DISCONTINUED | OUTPATIENT
Start: 2022-01-18 | End: 2022-01-21

## 2022-01-18 RX ORDER — MORPHINE SULFATE 2 MG/ML
2 INJECTION, SOLUTION INTRAMUSCULAR; INTRAVENOUS EVERY 2 HOUR PRN
Status: DISCONTINUED | OUTPATIENT
Start: 2022-01-18 | End: 2022-01-21

## 2022-01-18 RX ORDER — METOPROLOL SUCCINATE 50 MG/1
50 TABLET, EXTENDED RELEASE ORAL
Status: DISCONTINUED | OUTPATIENT
Start: 2022-01-18 | End: 2022-01-19

## 2022-01-18 RX ORDER — FENOFIBRATE 134 MG/1
134 CAPSULE ORAL
Refills: 3 | Status: DISCONTINUED | OUTPATIENT
Start: 2022-01-19 | End: 2022-01-21

## 2022-01-18 RX ADMIN — HEPARIN SODIUM 3000 UNITS: 1000 INJECTION, SOLUTION INTRAVENOUS; SUBCUTANEOUS at 15:03:00

## 2022-01-18 RX ADMIN — LIDOCAINE HYDROCHLORIDE 70 MG: 10 INJECTION, SOLUTION EPIDURAL; INFILTRATION; INTRACAUDAL; PERINEURAL at 13:41:00

## 2022-01-18 RX ADMIN — SODIUM CHLORIDE: 9 INJECTION, SOLUTION INTRAVENOUS at 12:34:00

## 2022-01-18 RX ADMIN — HEPARIN SODIUM 8000 UNITS: 1000 INJECTION, SOLUTION INTRAVENOUS; SUBCUTANEOUS at 14:45:00

## 2022-01-18 RX ADMIN — METOPROLOL TARTRATE 5 MG: 5 INJECTION INTRAVENOUS at 18:27:00

## 2022-01-18 RX ADMIN — SODIUM CHLORIDE: 9 INJECTION, SOLUTION INTRAVENOUS at 17:32:00

## 2022-01-18 RX ADMIN — NITROGLYCERIN 50 MCG/MIN: 20 INJECTION INTRAVENOUS at 18:28:00

## 2022-01-18 RX ADMIN — DOBUTAMINE HYDROCHLORIDE 3 MCG/KG/MIN: 200 INJECTION INTRAVENOUS at 14:42:00

## 2022-01-18 RX ADMIN — SODIUM CHLORIDE: 9 INJECTION, SOLUTION INTRAVENOUS at 13:22:00

## 2022-01-18 RX ADMIN — DOBUTAMINE HYDROCHLORIDE 2 MCG/KG/MIN: 200 INJECTION INTRAVENOUS at 16:51:00

## 2022-01-18 RX ADMIN — NITROGLYCERIN 80 MCG/MIN: 20 INJECTION INTRAVENOUS at 19:30:00

## 2022-01-18 RX ADMIN — METOPROLOL SUCCINATE 50 MG: 50 TABLET, EXTENDED RELEASE ORAL at 20:13:00

## 2022-01-18 RX ADMIN — HEPARIN SODIUM 2000 UNITS: 1000 INJECTION, SOLUTION INTRAVENOUS; SUBCUTANEOUS at 15:46:00

## 2022-01-18 RX ADMIN — SODIUM CHLORIDE: 9 INJECTION, SOLUTION INTRAVENOUS at 19:00:00

## 2022-01-18 RX ADMIN — NITROGLYCERIN 40 MCG: 20 INJECTION INTRAVENOUS at 18:05:00

## 2022-01-18 RX ADMIN — CLOPIDOGREL BISULFATE 300 MG: 75 TABLET ORAL at 20:13:00

## 2022-01-18 RX ADMIN — SODIUM CHLORIDE: 9 INJECTION, SOLUTION INTRAVENOUS at 13:47:00

## 2022-01-18 RX ADMIN — NITROGLYCERIN 60 MCG/MIN: 20 INJECTION INTRAVENOUS at 18:59:00

## 2022-01-18 RX ADMIN — MORPHINE SULFATE 2 MG: 2 INJECTION, SOLUTION INTRAMUSCULAR; INTRAVENOUS at 20:12:00

## 2022-01-18 RX ADMIN — ROCURONIUM BROMIDE 50 MG: 10 INJECTION, SOLUTION INTRAVENOUS at 13:41:00

## 2022-01-18 RX ADMIN — DEXAMETHASONE SODIUM PHOSPHATE 4 MG: 4 MG/ML VIAL (ML) INJECTION at 17:17:00

## 2022-01-18 RX ADMIN — SODIUM CHLORIDE: 9 INJECTION, SOLUTION INTRAVENOUS at 16:06:00

## 2022-01-18 RX ADMIN — DOBUTAMINE HYDROCHLORIDE 5 MCG/KG/MIN: 200 INJECTION INTRAVENOUS at 13:28:00

## 2022-01-18 RX ADMIN — ONDANSETRON 4 MG: 2 INJECTION INTRAMUSCULAR; INTRAVENOUS at 17:17:00

## 2022-01-18 RX ADMIN — ATORVASTATIN CALCIUM 80 MG: 80 TABLET, FILM COATED ORAL at 22:19:00

## 2022-01-18 NOTE — ANESTHESIA PROCEDURE NOTES
Peripheral IV  Date/Time: 1/18/2022 1:40 PM  Inserted by: Russell Garcia MD    Placement  Needle size: 18 G  Laterality: right  Location: hand  Local anesthetic: none  Site prep: alcohol  Technique: anatomical landmarks  Attempts: 1

## 2022-01-18 NOTE — PROGRESS NOTES
65 y/o male with CABG 5 years ago with Classs 4 angina, DM,HTN, EF 35-40%  Left main 99% long, lAD 100% mid,  Circ,,  RCA with patent LIMA  LFA LIMA angio  RFA RCA and LCA angio  RCA graft occluded    Impella LFA  3.5 EBU.  With [email or web address]

## 2022-01-18 NOTE — ANESTHESIA PROCEDURE NOTES
Airway  Date/Time: 1/18/2022 1:44 PM  Urgency: elective      General Information and Staff    Patient location during procedure: OR  Anesthesiologist: Shavon Swenson MD  Performed: anesthesiologist     Indications and Patient Condition  Indications for air

## 2022-01-18 NOTE — ANESTHESIA PREPROCEDURE EVALUATION
PRE-OP EVALUATION    Patient Name: Carmen Chapa    Admit Diagnosis: Chronic total occlusion of native coronary artery [I25.10, I25.82]    Pre-op Diagnosis: * No surgery found *        Anesthesia Procedure: CATH ANGIO    * Surgery not found *    Pre-op GI/Hepatic/Renal      (+) GERD                           Cardiovascular  Comment: Multivessel CAD now for complex PCI  Of CFX,LAD  to RCA with Impella support. LVEF 45%    ECG reviewed.             (+) hypertension   (+) hyperlipidemia  (+) CAD  (+) p guidelines. Comment: Addendum    The patients H and P was reviewed today and is unchanged from the anesthesia preoperative evaluation by my colleague. The updated labs and vitals and results have been reviewed.   Anesthetic plan reviewed with the pa

## 2022-01-18 NOTE — ANESTHESIA PROCEDURE NOTES
Arterial Line  Performed by: Tano Beauchamp MD  Authorized by: Tano Beauchamp MD     General Information and Staff    Procedure Start:  1/18/2022 1:33 PM  Procedure End:  1/18/2022 1:36 PM  Anesthesiologist:  Tano Beauchamp MD  Performed By:  Chino Molina

## 2022-01-18 NOTE — ANESTHESIA POSTPROCEDURE EVALUATION
[address] Patient Status:  Inpatient   Age/Gender 64year old male MRN [id number]   [facility] 6NE-A Attending Magdaleno Begum MD   Hosp Day # 0 PCP DINA Peña       Anesthesia Post-op Note    Procedure: CARD

## 2022-01-19 LAB
ANION GAP SERPL CALC-SCNC: 5 MMOL/L (ref 0–18)
ATRIAL RATE: 80 BPM
ATRIAL RATE: 95 BPM
BASOPHILS # BLD AUTO: 0 X10(3) UL (ref 0–0.2)
BASOPHILS NFR BLD AUTO: 0 %
BUN BLD-MCNC: 13 MG/DL (ref 7–18)
CALCIUM BLD-MCNC: 8.9 MG/DL (ref 8.5–10.1)
CHLORIDE SERPL-SCNC: 110 MMOL/L (ref 98–112)
CO2 SERPL-SCNC: 25 MMOL/L (ref 21–32)
CREAT BLD-MCNC: 0.85 MG/DL
EOSINOPHIL # BLD AUTO: 0.01 X10(3) UL (ref 0–0.7)
EOSINOPHIL NFR BLD AUTO: 0.1 %
ERYTHROCYTE [DISTWIDTH] IN BLOOD BY AUTOMATED COUNT: 12.2 %
EST. AVERAGE GLUCOSE BLD GHB EST-MCNC: 206 MG/DL (ref 68–126)
GLUCOSE BLD-MCNC: 151 MG/DL (ref 70–99)
GLUCOSE BLD-MCNC: 204 MG/DL (ref 70–99)
GLUCOSE BLD-MCNC: 227 MG/DL (ref 70–99)
GLUCOSE BLD-MCNC: 245 MG/DL (ref 70–99)
GLUCOSE BLD-MCNC: 274 MG/DL (ref 70–99)
HBA1C MFR BLD: 8.8 % (ref ?–5.7)
HCT VFR BLD AUTO: 38.3 %
HGB BLD-MCNC: 13.1 G/DL
IMM GRANULOCYTES # BLD AUTO: 0.02 X10(3) UL (ref 0–1)
IMM GRANULOCYTES NFR BLD: 0.3 %
LYMPHOCYTES # BLD AUTO: 1.1 X10(3) UL (ref 1–4)
LYMPHOCYTES NFR BLD AUTO: 13.9 %
MAGNESIUM SERPL-MCNC: 1.8 MG/DL (ref 1.6–2.6)
MCH RBC QN AUTO: 30.9 PG (ref 26–34)
MCHC RBC AUTO-ENTMCNC: 34.2 G/DL (ref 31–37)
MCV RBC AUTO: 90.3 FL
MONOCYTES # BLD AUTO: 0.45 X10(3) UL (ref 0.1–1)
MONOCYTES NFR BLD AUTO: 5.7 %
NEUTROPHILS # BLD AUTO: 6.31 X10 (3) UL (ref 1.5–7.7)
NEUTROPHILS # BLD AUTO: 6.31 X10(3) UL (ref 1.5–7.7)
NEUTROPHILS NFR BLD AUTO: 80 %
OSMOLALITY SERPL CALC.SUM OF ELEC: 296 MOSM/KG (ref 275–295)
P AXIS: 43 DEGREES
P AXIS: 54 DEGREES
P-R INTERVAL: 172 MS
P-R INTERVAL: 172 MS
PLATELET # BLD AUTO: 202 10(3)UL (ref 150–450)
POTASSIUM SERPL-SCNC: 4.1 MMOL/L (ref 3.5–5.1)
Q-T INTERVAL: 394 MS
Q-T INTERVAL: 400 MS
QRS DURATION: 138 MS
QRS DURATION: 140 MS
QTC CALCULATION (BEZET): 461 MS
QTC CALCULATION (BEZET): 495 MS
R AXIS: 104 DEGREES
R AXIS: 123 DEGREES
RBC # BLD AUTO: 4.24 X10(6)UL
SODIUM SERPL-SCNC: 140 MMOL/L (ref 136–145)
T AXIS: 0 DEGREES
T AXIS: 17 DEGREES
VENTRICULAR RATE: 80 BPM
VENTRICULAR RATE: 95 BPM
WBC # BLD AUTO: 7.9 X10(3) UL (ref 4–11)

## 2022-01-19 PROCEDURE — 99233 SBSQ HOSP IP/OBS HIGH 50: CPT | Performed by: HOSPITALIST

## 2022-01-19 RX ORDER — MAGNESIUM OXIDE 400 MG (241.3 MG MAGNESIUM) TABLET
400 TABLET ONCE
Status: COMPLETED | OUTPATIENT
Start: 2022-01-19 | End: 2022-01-19

## 2022-01-19 RX ORDER — LOSARTAN POTASSIUM 25 MG/1
25 TABLET ORAL DAILY
Status: DISCONTINUED | OUTPATIENT
Start: 2022-01-19 | End: 2022-01-21

## 2022-01-19 RX ORDER — METOPROLOL SUCCINATE 25 MG/1
25 TABLET, EXTENDED RELEASE ORAL ONCE
Status: COMPLETED | OUTPATIENT
Start: 2022-01-19 | End: 2022-01-19

## 2022-01-19 RX ORDER — ISOSORBIDE MONONITRATE 30 MG/1
30 TABLET, EXTENDED RELEASE ORAL DAILY
Status: DISCONTINUED | OUTPATIENT
Start: 2022-01-19 | End: 2022-01-21

## 2022-01-19 RX ORDER — HYDROCODONE BITARTRATE AND ACETAMINOPHEN 5; 325 MG/1; MG/1
1 TABLET ORAL EVERY 4 HOURS PRN
Status: DISCONTINUED | OUTPATIENT
Start: 2022-01-19 | End: 2022-01-21

## 2022-01-19 RX ORDER — HYDROCODONE BITARTRATE AND ACETAMINOPHEN 5; 325 MG/1; MG/1
2 TABLET ORAL EVERY 4 HOURS PRN
Status: DISCONTINUED | OUTPATIENT
Start: 2022-01-19 | End: 2022-01-21

## 2022-01-19 RX ADMIN — NITROGLYCERIN 20 MCG/MIN: 20 INJECTION INTRAVENOUS at 12:00:00

## 2022-01-19 RX ADMIN — NITROGLYCERIN 30 MCG/MIN: 20 INJECTION INTRAVENOUS at 06:00:00

## 2022-01-19 RX ADMIN — MORPHINE SULFATE 2 MG: 2 INJECTION, SOLUTION INTRAMUSCULAR; INTRAVENOUS at 00:17:00

## 2022-01-19 RX ADMIN — NITROGLYCERIN 20 MCG/MIN: 20 INJECTION INTRAVENOUS at 07:00:00

## 2022-01-19 RX ADMIN — NITROGLYCERIN 80 MCG/MIN: 20 INJECTION INTRAVENOUS at 04:05:00

## 2022-01-19 RX ADMIN — FENOFIBRATE 134 MG: 134 CAPSULE ORAL at 08:49:00

## 2022-01-19 RX ADMIN — MORPHINE SULFATE 2 MG: 2 INJECTION, SOLUTION INTRAMUSCULAR; INTRAVENOUS at 08:54:00

## 2022-01-19 RX ADMIN — MAGNESIUM OXIDE 400 MG (241.3 MG MAGNESIUM) TABLET 400 MG: TABLET at 05:26:00

## 2022-01-19 RX ADMIN — MORPHINE SULFATE 2 MG: 2 INJECTION, SOLUTION INTRAMUSCULAR; INTRAVENOUS at 04:42:00

## 2022-01-19 RX ADMIN — NITROGLYCERIN 30 MCG/MIN: 20 INJECTION INTRAVENOUS at 11:35:00

## 2022-01-19 RX ADMIN — METOPROLOL SUCCINATE 50 MG: 50 TABLET, EXTENDED RELEASE ORAL at 05:26:00

## 2022-01-19 RX ADMIN — NITROGLYCERIN 15 MCG/MIN: 20 INJECTION INTRAVENOUS at 18:19:00

## 2022-01-19 RX ADMIN — ATORVASTATIN CALCIUM 80 MG: 80 TABLET, FILM COATED ORAL at 21:19:00

## 2022-01-19 RX ADMIN — ASPIRIN 81 MG: 81 TABLET ORAL at 08:48:00

## 2022-01-19 RX ADMIN — ISOSORBIDE MONONITRATE 30 MG: 30 TABLET, EXTENDED RELEASE ORAL at 18:35:00

## 2022-01-19 RX ADMIN — NITROGLYCERIN 40 MCG/MIN: 20 INJECTION INTRAVENOUS at 05:45:00

## 2022-01-19 RX ADMIN — NITROGLYCERIN 10 MCG/MIN: 20 INJECTION INTRAVENOUS at 22:00:00

## 2022-01-19 RX ADMIN — HYDROCODONE BITARTRATE AND ACETAMINOPHEN 2 TABLET: 5; 325 TABLET ORAL at 10:19:00

## 2022-01-19 RX ADMIN — METOPROLOL SUCCINATE 25 MG: 25 TABLET, EXTENDED RELEASE ORAL at 18:35:00

## 2022-01-19 RX ADMIN — NITROGLYCERIN 20 MCG/MIN: 20 INJECTION INTRAVENOUS at 21:00:00

## 2022-01-19 RX ADMIN — NITROGLYCERIN 25 MCG/MIN: 20 INJECTION INTRAVENOUS at 18:30:00

## 2022-01-19 RX ADMIN — NITROGLYCERIN 15 MCG/MIN: 20 INJECTION INTRAVENOUS at 21:30:00

## 2022-01-19 RX ADMIN — LOSARTAN POTASSIUM 25 MG: 25 TABLET ORAL at 10:19:00

## 2022-01-19 RX ADMIN — HYDROCODONE BITARTRATE AND ACETAMINOPHEN 2 TABLET: 5; 325 TABLET ORAL at 16:45:00

## 2022-01-19 RX ADMIN — NITROGLYCERIN 10 MCG/MIN: 20 INJECTION INTRAVENOUS at 18:15:00

## 2022-01-19 RX ADMIN — CLOPIDOGREL BISULFATE 75 MG: 75 TABLET ORAL at 08:49:00

## 2022-01-19 RX ADMIN — NITROGLYCERIN 60 MCG/MIN: 20 INJECTION INTRAVENOUS at 04:25:00

## 2022-01-19 RX ADMIN — MORPHINE SULFATE 4 MG: 4 INJECTION, SOLUTION INTRAMUSCULAR; INTRAVENOUS at 02:19:00

## 2022-01-19 NOTE — PLAN OF CARE
1750 Received from cath lab, sedated extubated on non re breather . Left radial fabian [id number] cuff pressure lower. Paged Dr. Chris Main no orders. 1820Returned page. Orders to straight cath. Titrate nitroglycerin gtt . Lopressor IVP . Post PCI orders.  S/P

## 2022-01-19 NOTE — ADDENDUM NOTE
Addendum  created 01/18/22 1802 by Lashanda Terry MD    Flowsheet accepted, Intraprocedure Flowsheets edited

## 2022-01-19 NOTE — PLAN OF CARE
This am having chest pain states only thing that helps is morphine . EKG done  . Reviewed by [facility] Cardiology. Norco given which helped pain down to minimal. Family has called and updated they have also talked to [facility] on phone.  Able to wean off nitroglyc

## 2022-01-19 NOTE — DIETARY NOTE
Clinical Nutrition     Dietitian consult received per cardiac rehab standing order. Pt to be educated by cardiac rehab staff and encouraged to attend outpatient classes taught by CAROLINA. CAROLINA available PRN.     Jossie Zeng RD, LDN  Clinical Dietitian  Phone [phone]

## 2022-01-19 NOTE — CONSULTS
[address] Patient Status:  Inpatient    1965 MRN [id number]   [facility] 6NE-A Attending Magdaleno Begum MD   Hosp Day # 0 PCP DINA Peña     Reason for consult: med mgmt    Requested TIAs (cause of death)   • Lipids Mother         hyperlipidemia   • Hypertension Mother    • Stroke Mother         TIAs (cause of death)   • Diabetes Mother    • Heart Disease Sister         CVA   • Hypertension Sister    • Lipids Sister    • Heart Disea Regular rate and rhythm. No murmurs, rubs or gallops. Equal pulses. Bilateral groin sites clean with no hematomas  Chest and Back: No tenderness or deformity. Abdomen: Soft, nontender, nondistended. Positive bowel sounds.  No rebound, guarding or organom

## 2022-01-19 NOTE — PROGRESS NOTES
[facility]     Hospitalist Progress Note     Saira Espinal Patient Status:  Inpatient    1965 MRN [id number]   [facility] 6NE-A Attending Cielo Mario MD   [address] Day # 1 PCP DINA Lorenzo     Chief Complaint: CP    Sub Epic.    Medications:   • losartan  25 mg Oral Daily   • [START ON 1/20/2022] metoprolol succinate  75 mg Oral Daily Beta Blocker   • clopidogrel  75 mg Oral Daily   • aspirin  81 mg Oral Daily   • fenofibrate micronized  134 mg Oral Daily with breakfast

## 2022-01-19 NOTE — PLAN OF CARE
Assumed care of pt  @9741. Rec'd pt in bed, resting. Pt. Reports dull chest pain, relieved with Morphine given PRN. Tele=NSR, 's per arterial line. NTG drip titrated to keep SBP <140. R. Groin perclose C/D/I.   Left groin with bloody drainage no

## 2022-01-19 NOTE — PROGRESS NOTES
Progress Note  Humaira Philip Patient Status:  Inpatient    1965 MRN [id number]   [facility] 6NE-A Attending Maxx Salgado MD   [facility] Day # 1 PCP DINA Kaufman     Subjective:  Resting comfortably in bed, lying nearly flat Nitroglycerin in D5W 20 mcg/min (01/19/22 0700)           Assessment:  • Angina, CAD s/p complex Impella assisted PCI 1/18/22 with Dr. Chris Main of [facility], LAD, Left main  o Needs  RCA in future  o  cx, patent LIMA, occluded RCA graft  o Previous CABG 2

## 2022-01-20 LAB
ATRIAL RATE: 91 BPM
GLUCOSE BLD-MCNC: 114 MG/DL (ref 70–99)
GLUCOSE BLD-MCNC: 150 MG/DL (ref 70–99)
GLUCOSE BLD-MCNC: 185 MG/DL (ref 70–99)
GLUCOSE BLD-MCNC: 256 MG/DL (ref 70–99)
MAGNESIUM SERPL-MCNC: 1.9 MG/DL (ref 1.6–2.6)
P AXIS: 40 DEGREES
P-R INTERVAL: 176 MS
Q-T INTERVAL: 388 MS
QRS DURATION: 138 MS
QTC CALCULATION (BEZET): 477 MS
R AXIS: 109 DEGREES
T AXIS: 9 DEGREES
VENTRICULAR RATE: 91 BPM

## 2022-01-20 PROCEDURE — 99232 SBSQ HOSP IP/OBS MODERATE 35: CPT | Performed by: HOSPITALIST

## 2022-01-20 RX ORDER — ACETAMINOPHEN 325 MG/1
650 TABLET ORAL EVERY 6 HOURS PRN
Status: DISCONTINUED | OUTPATIENT
Start: 2022-01-20 | End: 2022-01-21

## 2022-01-20 RX ADMIN — LOSARTAN POTASSIUM 25 MG: 25 TABLET ORAL at 08:09:00

## 2022-01-20 RX ADMIN — NITROGLYCERIN 10 MCG/MIN: 20 INJECTION INTRAVENOUS at 03:05:00

## 2022-01-20 RX ADMIN — ISOSORBIDE MONONITRATE 30 MG: 30 TABLET, EXTENDED RELEASE ORAL at 08:09:00

## 2022-01-20 RX ADMIN — HYDROCODONE BITARTRATE AND ACETAMINOPHEN 2 TABLET: 5; 325 TABLET ORAL at 01:02:00

## 2022-01-20 RX ADMIN — NITROGLYCERIN 10 MCG/MIN: 20 INJECTION INTRAVENOUS at 01:02:00

## 2022-01-20 RX ADMIN — FENOFIBRATE 134 MG: 134 CAPSULE ORAL at 08:09:00

## 2022-01-20 RX ADMIN — ACETAMINOPHEN 650 MG: 325 TABLET ORAL at 17:01:00

## 2022-01-20 RX ADMIN — NITROGLYCERIN 15 MCG/MIN: 20 INJECTION INTRAVENOUS at 01:30:00

## 2022-01-20 RX ADMIN — ASPIRIN 81 MG: 81 TABLET ORAL at 08:09:00

## 2022-01-20 RX ADMIN — ATORVASTATIN CALCIUM 80 MG: 80 TABLET, FILM COATED ORAL at 20:56:00

## 2022-01-20 RX ADMIN — CLOPIDOGREL BISULFATE 75 MG: 75 TABLET ORAL at 08:09:00

## 2022-01-20 NOTE — PLAN OF CARE
Assumed care at 21 . Pt is A&Ox4. Pt is on RA with , sats maintaining >90%, lungs clear. NSR on tele, VSS. Pulses doppler. No complaints of cardiac symptoms. Continent of B&B. Denies pain at this time. Up SBA, tolerating well.  Plan of care reviewed wit instruct to report SOB or any respiratory difficulty  - Respiratory Therapy support as indicated  - Manage/alleviate anxiety  - Monitor for signs/symptoms of CO2 retention  Outcome: Progressing     Problem: GENITOURINARY - ADULT  Goal: Absence of urinary r Control  Goal: Glucose maintained within prescribed range  Description: INTERVENTIONS:  - Monitor Blood Glucose as ordered  - Assess for signs and symptoms of hyperglycemia and hypoglycemia  - Administer ordered medications to maintain glucose within targe

## 2022-01-20 NOTE — PROGRESS NOTES
[facility]     Hospitalist Progress Note     Drucella Boeck Patient Status:  Inpatient    1965 MRN [id number]   [facility] 6NE-A Attending Promise Rincon MD   [facility] Day # 2 PCP DINA Marcelo     Chief Complaint: CP    Sub • metoprolol succinate  75 mg Oral Daily Beta Blocker   • insulin detemir  5 Units Subcutaneous Nightly   • isosorbide mononitrate ER  30 mg Oral Daily   • clopidogrel  75 mg Oral Daily   • aspirin  81 mg Oral Daily   • fenofibrate micronized  134 mg Ora

## 2022-01-20 NOTE — PLAN OF CARE
0634 Patient awoke from sleep , Having chest pain rates 7out 0-10. Feels chest pressure . Elevated heart rate and B/P . Paged Cardiology. [address] [facility] [facility] returned call . I restarted IV nitroglycerin. See titration orders received EKG done.  Meds p

## 2022-01-20 NOTE — PROGRESS NOTES
[facility]  Progress Note    Saira Espinal Patient Status:  Inpatient    1965 MRN [id number]   [facility] 6NE-A Attending Cielo Mario MD   [facility] Day # 2 PCP DINA Lorenzo     Assessment:  #Day 2 post complex Impella s

## 2022-01-20 NOTE — OCCUPATIONAL THERAPY NOTE
Received order for OT evaluation through functional mobility screening. Per PT, pt is performing ADL independently. OT will sign off.

## 2022-01-20 NOTE — CARDIAC REHAB
CAD education completed. Stent cards given. Pt to do outpatient cardiac rehab after future intervention.

## 2022-01-20 NOTE — PROCEDURES
[facility]    PATIENT'S NAME: Gonzalez SAM   ATTENDING PHYSICIAN: Arnulfo Nyhan, M.D. OPERATING PHYSICIAN: Asya Aranda M.D.    PATIENT ACCOUNT#:   [de-identified]    LOCATION:  [address] [place] [address]  MEDICAL RECORD #:   [id number]       DATE OF BIRTH: circumflex. I was able to get into the obtuse marginal and then bring a [employer]air down after removing the GUÉRET and redirect the . Then exchanged for a Runthrough wire. The ramus was predilated with a 2.0 balloon.   Then, a 2.25 x 12 stent was plac

## 2022-01-20 NOTE — PROGRESS NOTES
Paged by primary RN for pt c/o CP. Reports pt c/o 7/10 CP that started when he was startled awake. Primary RN restarted IV nitroglycerin gtt to titrate until CP resolved. EKG repeated with no acute changes. Discussed with Dr. Brea Palafox ordered.  Additio

## 2022-01-20 NOTE — PLAN OF CARE
Assumed care of Rogers @ approximately 4033: awake, alert, oriented, pleasant, and cooperative with care; on room air; NSR on the monitor w/nitroglycerin gtt for chest pain/chest pressure and HTN -- gtt weaned off but required to be restarted approximate including cough, deep breathe, Incentive Spirometry  - Assess the need for suctioning and perform as needed  - Assess and instruct to report SOB or any respiratory difficulty  - Respiratory Therapy support as indicated  - Manage/alleviate anxiety  - Monito appropriate  - Initiate Pressure Ulcer prevention bundle as indicated  Outcome: Progressing     Problem: Diabetes/Glucose Control  Goal: Glucose maintained within prescribed range  Description: INTERVENTIONS:  - Monitor Blood Glucose as ordered  - Assess f

## 2022-01-20 NOTE — PHYSICAL THERAPY NOTE
PHYSICAL THERAPY EVALUATION - INPATIENT     Room Number: 2609/2609-A  Evaluation Date: 1/20/2022  Type of Evaluation: Initial  Physician Order: PT Eval and Treat    Presenting Problem: s/p PCI with stents to LAD and circumflex artery  Co-Morbidities COGNITION  · Overall Cognitive Status:  WFL - within functional limits    RANGE OF MOTION AND STRENGTH ASSESSMENT  Upper extremity ROM and strength are within functional limits     Lower extremity ROM is within functional limits     Lower extremity str services at this time          Patient End of Session: With [facility] staff;RN aware of session/findings (Pt being transfered to new room)    Therapist and SPT Gillian Castaneda PPE: Mask, gloves and goggles were worn.   Patient/Family PPE: Mask worn in hallway

## 2022-01-21 VITALS
BODY MASS INDEX: 28 KG/M2 | OXYGEN SATURATION: 100 % | DIASTOLIC BLOOD PRESSURE: 67 MMHG | TEMPERATURE: 99 F | SYSTOLIC BLOOD PRESSURE: 135 MMHG | WEIGHT: 166 LBS | HEART RATE: 70 BPM | RESPIRATION RATE: 18 BRPM

## 2022-01-21 LAB
GLUCOSE BLD-MCNC: 101 MG/DL (ref 70–99)
GLUCOSE BLD-MCNC: 131 MG/DL (ref 70–99)

## 2022-01-21 PROCEDURE — 99239 HOSP IP/OBS DSCHRG MGMT >30: CPT | Performed by: HOSPITALIST

## 2022-01-21 RX ORDER — FENOFIBRATE 145 MG/1
145 TABLET, COATED ORAL
Qty: 30 TABLET | Refills: 3 | Status: SHIPPED | OUTPATIENT
Start: 2022-01-21

## 2022-01-21 RX ORDER — METOPROLOL SUCCINATE 25 MG/1
75 TABLET, EXTENDED RELEASE ORAL
Qty: 30 TABLET | Refills: 3 | Status: SHIPPED
Start: 2022-01-22

## 2022-01-21 RX ORDER — LOSARTAN POTASSIUM 25 MG/1
25 TABLET ORAL DAILY
Qty: 30 TABLET | Refills: 3 | Status: SHIPPED
Start: 2022-01-22

## 2022-01-21 RX ORDER — ISOSORBIDE MONONITRATE 30 MG/1
30 TABLET, EXTENDED RELEASE ORAL DAILY
Qty: 30 TABLET | Refills: 3 | Status: SHIPPED
Start: 2022-01-22 | End: 2022-01-21

## 2022-01-21 RX ORDER — MELATONIN
3 NIGHTLY PRN
Status: DISCONTINUED | OUTPATIENT
Start: 2022-01-21 | End: 2022-01-21

## 2022-01-21 RX ORDER — ISOSORBIDE MONONITRATE 30 MG/1
30 TABLET, EXTENDED RELEASE ORAL DAILY
Qty: 30 TABLET | Refills: 3 | Status: SHIPPED | OUTPATIENT
Start: 2022-01-22

## 2022-01-21 RX ADMIN — MELATONIN 3 MG: at 02:51:00

## 2022-01-21 RX ADMIN — ASPIRIN 81 MG: 81 TABLET ORAL at 08:52:00

## 2022-01-21 RX ADMIN — CLOPIDOGREL BISULFATE 75 MG: 75 TABLET ORAL at 08:52:00

## 2022-01-21 RX ADMIN — ISOSORBIDE MONONITRATE 30 MG: 30 TABLET, EXTENDED RELEASE ORAL at 08:52:00

## 2022-01-21 RX ADMIN — LOSARTAN POTASSIUM 25 MG: 25 TABLET ORAL at 08:52:00

## 2022-01-21 RX ADMIN — FENOFIBRATE 134 MG: 134 CAPSULE ORAL at 08:52:00

## 2022-01-21 NOTE — PROGRESS NOTES
[facility]     Hospitalist Progress Note     Deedee Kelsey Patient Status:  Inpatient    1965 MRN [id number]   [facility] 6NE-A Attending Edmar Montoya MD   [facility] Day # 3 PCP DINA Lott     Chief Complaint: CP    Sub Oral Daily   • metoprolol succinate  75 mg Oral Daily Beta Blocker   • insulin detemir  5 Units Subcutaneous Nightly   • isosorbide mononitrate ER  30 mg Oral Daily   • clopidogrel  75 mg Oral Daily   • aspirin  81 mg Oral Daily   • fenofibrate micronized

## 2022-01-21 NOTE — PROGRESS NOTES
Progress Note  Vidhya Quinn Patient Status:  Inpatient    1965 MRN [id number]   [facility] 2NE-A Attending Bakari Biswas MD   [facility] Day # 3 PCP DINA Aceves     Subjective: In bed feeling well. No more c/o chest pain.  R Units Subcutaneous BID   • Insulin Aspart Pen  1-10 Units Subcutaneous TID AC and HS     • Nitroglycerin in D5W Stopped (01/20/22 0800)           Assessment:  · Angina, CAD s/p complex Impella assisted PCI 1/18/22 with Dr. Kathy Kessler of [facility], LAD, Left main

## 2022-01-21 NOTE — PROGRESS NOTES
Explained discharge instructions including mediations and follow ups to the patient, verbalizes understanding. IV removed, catheter intact. Tele monitor discontinued. Transport escorted patient to [facility].

## 2022-01-21 NOTE — PLAN OF CARE
Received patient, alert and oriented. Denied chest pain, denied SOB. SR on tele. Bilateral groins soft. Discussed POC. Due meds given. Safety measures reinforced, call light within reach. Needs attended to. Will continue to monitor.     Problem: CARDIOVASCU Monitor for signs/symptoms of CO2 retention  Outcome: Progressing     Problem: GENITOURINARY - ADULT  Goal: Absence of urinary retention  Description: INTERVENTIONS:  - Assess patient’s ability to void and empty bladder  - Monitor intake/output and perform Assess for signs and symptoms of hyperglycemia and hypoglycemia  - Administer ordered medications to maintain glucose within target range  - Assess barriers to adequate nutritional intake and initiate nutrition consult as needed  - Instruct patient on self

## 2022-01-21 NOTE — PLAN OF CARE
Assumed care at 0730. Pt is A&Ox4. Pt is on RA with , sats maintaining >90%, lungs clear. NSR on tele, VSS. Pulses doppler. No complaints of cardiac symptoms. Continent of B&B. Denies pain at this time. Up ad tatyana, tolerating well.  Plan of care reviewed and instruct to report SOB or any respiratory difficulty  - Respiratory Therapy support as indicated  - Manage/alleviate anxiety  - Monitor for signs/symptoms of CO2 retention  Outcome: Progressing     Problem: GENITOURINARY - ADULT  Goal: Absence of urina Diabetes/Glucose Control  Goal: Glucose maintained within prescribed range  Description: INTERVENTIONS:  - Monitor Blood Glucose as ordered  - Assess for signs and symptoms of hyperglycemia and hypoglycemia  - Administer ordered medications to maintain glu

## 2022-01-22 NOTE — DISCHARGE SUMMARY
[facility] HOSPITALIST  DISCHARGE SUMMARY     Latosha Arteaga Patient Status:  Inpatient    1965 MRN [id number]   [facility] 2NE-A Attending No att. providers found   Hosp Day # 3 PCP DINA Guardado     Date of Admission: 2022 START taking these medications      Instructions Prescription details   isosorbide mononitrate ER 30 MG Tb24  Commonly known as: IMDUR      Take 1 tablet (30 mg total) by mouth daily.    Quantity: 30 tablet  Refills: 3     losartan 25 MG Tabs  Commonly kn doctor or nurse    Bring a paper prescription for each of these medications  · fenofibrate 145 MG Tabs  · isosorbide mononitrate ER 30 MG Tb24  · losartan 25 MG Tabs  · metoprolol succinate 25 MG Tb24         [place]    Follow-up appointment:

## 2022-01-24 ENCOUNTER — TELEPHONE (OUTPATIENT)
Dept: OTHER | Facility: HOSPITAL | Age: 57
End: 2022-01-24

## 2022-01-25 ENCOUNTER — PATIENT OUTREACH (OUTPATIENT)
Dept: CASE MANAGEMENT | Age: 57
End: 2022-01-25

## 2022-01-25 NOTE — PROGRESS NOTES
1st attempt DM apt request (dc 01/21)    Nelly Daniela APRN  [address]  [address] [facility]  [phone]  Pt advised he will make his own apt  Closing encounter

## 2022-01-25 NOTE — PROGRESS NOTES
AMI Follow up Call  1. How are you doing now that you're home? Patient reports no concerns of infection or bleeding at groin procedure site; no pain issues at this time.     2. Since leaving the hospital, have you been able to get your prescriptions fille

## 2022-01-31 ENCOUNTER — LAB ENCOUNTER (OUTPATIENT)
Dept: LAB | Age: 57
End: 2022-01-31
Attending: INTERNAL MEDICINE
Payer: OTHER GOVERNMENT

## 2022-01-31 DIAGNOSIS — I73.9 CLAUDICATION (HCC): ICD-10-CM

## 2022-01-31 LAB — SARS-COV-2 RNA RESP QL NAA+PROBE: NOT DETECTED

## 2022-02-02 ENCOUNTER — HOSPITAL ENCOUNTER (OUTPATIENT)
Dept: INTERVENTIONAL RADIOLOGY/VASCULAR | Facility: HOSPITAL | Age: 57
Discharge: HOME | End: 2022-02-02
Attending: INTERNAL MEDICINE | Admitting: INTERNAL MEDICINE
Payer: COMMERCIAL

## 2022-02-02 VITALS
RESPIRATION RATE: 14 BRPM | BODY MASS INDEX: 29.59 KG/M2 | TEMPERATURE: 98 F | OXYGEN SATURATION: 100 % | HEART RATE: 61 BPM | SYSTOLIC BLOOD PRESSURE: 157 MMHG | HEIGHT: 63 IN | DIASTOLIC BLOOD PRESSURE: 83 MMHG | WEIGHT: 167 LBS

## 2022-02-02 DIAGNOSIS — I73.9 CLAUDICATION (HCC): Primary | ICD-10-CM

## 2022-02-02 DIAGNOSIS — I73.9 PVD (PERIPHERAL VASCULAR DISEASE) (HCC): ICD-10-CM

## 2022-02-02 LAB — GLUCOSE BLD-MCNC: 118 MG/DL (ref 70–99)

## 2022-02-02 PROCEDURE — 047L3Z1 DILATION OF LEFT FEMORAL ARTERY USING DRUG-COATED BALLOON, PERCUTANEOUS APPROACH: ICD-10-PCS | Performed by: INTERNAL MEDICINE

## 2022-02-02 PROCEDURE — 75710 ARTERY X-RAYS ARM/LEG: CPT

## 2022-02-02 PROCEDURE — 99152 MOD SED SAME PHYS/QHP 5/>YRS: CPT

## 2022-02-02 PROCEDURE — 99153 MOD SED SAME PHYS/QHP EA: CPT

## 2022-02-02 PROCEDURE — B41F1ZZ FLUOROSCOPY OF RIGHT LOWER EXTREMITY ARTERIES USING LOW OSMOLAR CONTRAST: ICD-10-PCS | Performed by: INTERNAL MEDICINE

## 2022-02-02 PROCEDURE — 37224 HC TRANSLUMINAL ANGIOPLASTY FEM POP UNILATERAL: CPT

## 2022-02-02 PROCEDURE — 82962 GLUCOSE BLOOD TEST: CPT

## 2022-02-02 RX ORDER — SODIUM CHLORIDE 9 MG/ML
INJECTION, SOLUTION INTRAVENOUS CONTINUOUS
Status: DISCONTINUED | OUTPATIENT
Start: 2022-02-02 | End: 2022-02-02

## 2022-02-02 RX ORDER — MIDAZOLAM HYDROCHLORIDE 1 MG/ML
INJECTION INTRAMUSCULAR; INTRAVENOUS
Status: COMPLETED
Start: 2022-02-02 | End: 2022-02-02

## 2022-02-02 RX ORDER — ACETAMINOPHEN 325 MG/1
TABLET ORAL
Status: COMPLETED
Start: 2022-02-02 | End: 2022-02-02

## 2022-02-02 RX ORDER — HEPARIN SODIUM 5000 [USP'U]/ML
INJECTION, SOLUTION INTRAVENOUS; SUBCUTANEOUS
Status: COMPLETED
Start: 2022-02-02 | End: 2022-02-02

## 2022-02-02 RX ORDER — LIDOCAINE HYDROCHLORIDE 10 MG/ML
INJECTION, SOLUTION EPIDURAL; INFILTRATION; INTRACAUDAL; PERINEURAL
Status: COMPLETED
Start: 2022-02-02 | End: 2022-02-02

## 2022-02-02 RX ADMIN — ACETAMINOPHEN 650 MG: 325 TABLET ORAL at 14:22:00

## 2022-02-02 NOTE — PROGRESS NOTES
Patient received from cath lab. Right groin site soft, no hematoma, dressing c/d/i femstop in place . Hemodynamics stable. flat bedrest, wife verbalize understanding.  Will cont to monitor

## 2022-02-02 NOTE — PROGRESS NOTES
Pt s/p PTA with Dr. Natacha Angelo. Pt recovered in holding. Right groin dressing CDI, no bleeding or hematoma. Fem stop on that was eventually removed. Pedals with doppler. Pt flat for 3 hours then [facility] elevated for the last hour. Pt wife at bedside. Dr. Natacha Angelo came and spoke to pt and wife at bedside. Discharge instructions reviewed with pt and wife, copy given. After recovery, pt up to bathroom and room with RN. Right groin dressing remains CDI, site soft. Pt dressed and IV removed. Pt discharged to [facility] via wheelchair by RN. Pt left with belongings. Pt wife drove pt home.

## 2022-02-09 NOTE — PROCEDURES
[facility]    PATIENT'S NAME: Stephanie SAM   ATTENDING PHYSICIAN: Colonel Eric M.D. OPERATING PHYSICIAN: Colonel Eric M.D. PATIENT ACCOUNT#:   [de-identified]    LOCATION:  [address]  MEDICAL RECORD #:   [id number]       YOB: 1965  ADMISSION DATE:       02/02/2022      OPERATION DATE:  02/02/2022    CARDIAC PROCEDURE TRANSCRIPTION    PERIPHERAL ANGIOGRAPHY/PERCUTANEOUS PERIPHERAL INTERVENTION    PREOPERATIVE DIAGNOSIS:    POSTOPERATIVE DIAGNOSIS:    PROCEDURE PERFORMED:      INDICATIONS:  This is a patient with a history of complex coronary intervention. Patient with now claudication. DESCRIPTION OF PROCEDURE:  The right groin was anesthetized. A 6-Palau[place] sheath was introduced. Crossover was placed to the left femoral artery, and angiography showed mild to moderate disease proximally, with 90% stenosis at the adductor canal into the posterolateral, with 90% stenosis of the anterior tibial and 70% to 80% stenosis of the posterior tibial.    An Pradeep sheath was placed to the common femoral.  The vessel was crossed with an 0.018 and the distal SFA and popliteal were dilated with a 5.0 balloon, then a 6 x 40 drug-coated balloon, In.Pact. Angiography revealed an excellent result. Patient has mainly claudication, so I did not intervene on the tibials. Angiography of the right showed mild disease of the proximal SFA. There was moderate disease of about 70% eccentrically just beyond the adductor canal.  The origin of the right anterior tibial is 90% with 80% to 90% tibioperoneal trunk.       IMPRESSION:    1.   A 90% distal SFA/popliteal, left, with a 90% stenosis at the left anterior tibial origin and 80% stenosis in the left popliteal.  2.   Successful balloon angioplasty with In.Pact balloon with a 6 x 40 balloon, prestenosis 90, post 0.  3.   A 70% to 80% distal right SFA/popliteal lesion with a right anterior tibial of 90% and 80% stenosis of the right tibioperoneal trunk. RECOMMENDATION:  Will see how his symptoms are following the intervention before considering intervention on the right.      Dictated By Jose Vasquez M.D.  d: 02/09/2022 07:35:21  t: 02/09/2022 11:32:08  [facility] [id number]  [facility]/

## 2022-02-24 ENCOUNTER — LAB ENCOUNTER (OUTPATIENT)
Dept: LAB | Facility: HOSPITAL | Age: 57
End: 2022-02-24
Attending: NURSE PRACTITIONER
Payer: COMMERCIAL

## 2022-02-24 DIAGNOSIS — Z12.11 SPECIAL SCREENING FOR MALIGNANT NEOPLASMS, COLON: Primary | ICD-10-CM

## 2022-02-24 DIAGNOSIS — Z00.00 WELL ADULT EXAM: ICD-10-CM

## 2022-02-24 DIAGNOSIS — E11.65 UNCONTROLLED TYPE 2 DIABETES MELLITUS WITH HYPERGLYCEMIA (HCC): ICD-10-CM

## 2022-02-24 DIAGNOSIS — E55.9 VITAMIN D DEFICIENCY: ICD-10-CM

## 2022-02-24 LAB
ALBUMIN SERPL-MCNC: 3.6 G/DL (ref 3.4–5)
ALBUMIN/GLOB SERPL: 1 {RATIO} (ref 1–2)
ALP LIVER SERPL-CCNC: 95 U/L
ALT SERPL-CCNC: 25 U/L
ANION GAP SERPL CALC-SCNC: 7 MMOL/L (ref 0–18)
AST SERPL-CCNC: 15 U/L (ref 15–37)
BASOPHILS # BLD AUTO: 0.04 X10(3) UL (ref 0–0.2)
BASOPHILS NFR BLD AUTO: 0.7 %
BILIRUB SERPL-MCNC: 0.5 MG/DL (ref 0.1–2)
BUN BLD-MCNC: 18 MG/DL (ref 7–18)
BUN/CREAT SERPL: 20 (ref 10–20)
CALCIUM BLD-MCNC: 9.8 MG/DL (ref 8.5–10.1)
CHLORIDE SERPL-SCNC: 106 MMOL/L (ref 98–112)
CHOLEST SERPL-MCNC: 222 MG/DL (ref ?–200)
CO2 SERPL-SCNC: 26 MMOL/L (ref 21–32)
CREAT BLD-MCNC: 0.9 MG/DL
CREAT UR-SCNC: 286 MG/DL
DEPRECATED RDW RBC AUTO: 43.7 FL (ref 35.1–46.3)
EOSINOPHIL # BLD AUTO: 0.3 X10(3) UL (ref 0–0.7)
EOSINOPHIL NFR BLD AUTO: 5.4 %
ERYTHROCYTE [DISTWIDTH] IN BLOOD BY AUTOMATED COUNT: 12.9 % (ref 11–15)
EST. AVERAGE GLUCOSE BLD GHB EST-MCNC: 183 MG/DL (ref 68–126)
FASTING PATIENT LIPID ANSWER: YES
FASTING STATUS PATIENT QL REPORTED: YES
GLOBULIN PLAS-MCNC: 3.7 G/DL (ref 2.8–4.4)
GLUCOSE BLD-MCNC: 181 MG/DL (ref 70–99)
HBA1C MFR BLD: 8 % (ref ?–5.7)
HCT VFR BLD AUTO: 41.1 %
HDLC SERPL-MCNC: 31 MG/DL (ref 40–59)
HGB BLD-MCNC: 14 G/DL
IMM GRANULOCYTES # BLD AUTO: 0.01 X10(3) UL (ref 0–1)
IMM GRANULOCYTES NFR BLD: 0.2 %
LDLC SERPL CALC-MCNC: 109 MG/DL (ref ?–100)
LYMPHOCYTES # BLD AUTO: 2 X10(3) UL (ref 1–4)
LYMPHOCYTES NFR BLD AUTO: 36.2 %
MCH RBC QN AUTO: 31.6 PG (ref 26–34)
MCHC RBC AUTO-ENTMCNC: 34.1 G/DL (ref 31–37)
MCV RBC AUTO: 92.8 FL
MICROALBUMIN UR-MCNC: 4.9 MG/DL
MICROALBUMIN/CREAT 24H UR-RTO: 17.1 UG/MG (ref ?–30)
MONOCYTES # BLD AUTO: 0.46 X10(3) UL (ref 0.1–1)
MONOCYTES NFR BLD AUTO: 8.3 %
NEUTROPHILS # BLD AUTO: 2.71 X10 (3) UL (ref 1.5–7.7)
NEUTROPHILS # BLD AUTO: 2.71 X10(3) UL (ref 1.5–7.7)
NEUTROPHILS NFR BLD AUTO: 49.2 %
NONHDLC SERPL-MCNC: 191 MG/DL (ref ?–130)
OSMOLALITY SERPL CALC.SUM OF ELEC: 294 MOSM/KG (ref 275–295)
PLATELET # BLD AUTO: 186 10(3)UL (ref 150–450)
POTASSIUM SERPL-SCNC: 4 MMOL/L (ref 3.5–5.1)
PROT SERPL-MCNC: 7.3 G/DL (ref 6.4–8.2)
RBC # BLD AUTO: 4.43 X10(6)UL
SODIUM SERPL-SCNC: 139 MMOL/L (ref 136–145)
TRIGL SERPL-MCNC: 472 MG/DL (ref 30–149)
TSI SER-ACNC: 1.77 MIU/ML (ref 0.36–3.74)
VIT D+METAB SERPL-MCNC: 10.6 NG/ML (ref 30–100)
VLDLC SERPL CALC-MCNC: 83 MG/DL (ref 0–30)
WBC # BLD AUTO: 5.5 X10(3) UL (ref 4–11)

## 2022-02-24 PROCEDURE — 85025 COMPLETE CBC W/AUTO DIFF WBC: CPT

## 2022-02-24 PROCEDURE — 80061 LIPID PANEL: CPT

## 2022-02-24 PROCEDURE — 80053 COMPREHEN METABOLIC PANEL: CPT

## 2022-02-24 PROCEDURE — 82306 VITAMIN D 25 HYDROXY: CPT

## 2022-02-24 PROCEDURE — 82043 UR ALBUMIN QUANTITATIVE: CPT

## 2022-02-24 PROCEDURE — 36415 COLL VENOUS BLD VENIPUNCTURE: CPT

## 2022-02-24 PROCEDURE — 84443 ASSAY THYROID STIM HORMONE: CPT

## 2022-02-24 PROCEDURE — 82570 ASSAY OF URINE CREATININE: CPT

## 2022-02-24 PROCEDURE — 83036 HEMOGLOBIN GLYCOSYLATED A1C: CPT

## 2022-04-02 ENCOUNTER — LAB ENCOUNTER (OUTPATIENT)
Dept: LAB | Age: 57
End: 2022-04-02
Attending: INTERNAL MEDICINE
Payer: COMMERCIAL

## 2022-04-02 ENCOUNTER — HOSPITAL ENCOUNTER (OUTPATIENT)
Dept: GENERAL RADIOLOGY | Age: 57
Discharge: HOME | End: 2022-04-02
Attending: INTERNAL MEDICINE
Payer: COMMERCIAL

## 2022-04-02 DIAGNOSIS — I25.82 COMPLETE OCCLUSION OF CORONARY ARTERY, CHRONIC: ICD-10-CM

## 2022-04-02 DIAGNOSIS — R93.1 ABNORMAL CORONARY ANGIOGRAM: ICD-10-CM

## 2022-04-02 DIAGNOSIS — I10 HTN (HYPERTENSION): ICD-10-CM

## 2022-04-02 DIAGNOSIS — E11.9 DM TYPE 2 (DIABETES MELLITUS, TYPE 2) (HCC): ICD-10-CM

## 2022-04-02 DIAGNOSIS — Z01.812 PRE-PROCEDURE LAB EXAM: ICD-10-CM

## 2022-04-02 PROCEDURE — 71046 X-RAY EXAM CHEST 2 VIEWS: CPT | Performed by: INTERNAL MEDICINE

## 2022-04-03 LAB — SARS-COV-2 RNA RESP QL NAA+PROBE: NOT DETECTED

## 2022-04-05 ENCOUNTER — HOSPITAL ENCOUNTER (OUTPATIENT)
Dept: INTERVENTIONAL RADIOLOGY/VASCULAR | Facility: HOSPITAL | Age: 57
Discharge: HOME | End: 2022-04-05
Attending: INTERNAL MEDICINE | Admitting: INTERNAL MEDICINE
Payer: COMMERCIAL

## 2022-04-05 VITALS
HEIGHT: 64 IN | WEIGHT: 168 LBS | HEART RATE: 72 BPM | DIASTOLIC BLOOD PRESSURE: 84 MMHG | OXYGEN SATURATION: 100 % | SYSTOLIC BLOOD PRESSURE: 155 MMHG | RESPIRATION RATE: 23 BRPM | BODY MASS INDEX: 28.68 KG/M2

## 2022-04-05 DIAGNOSIS — I25.82 CHRONIC TOTAL OCCLUSION OF NATIVE CORONARY ARTERY: ICD-10-CM

## 2022-04-05 DIAGNOSIS — I25.10 CHRONIC TOTAL OCCLUSION OF NATIVE CORONARY ARTERY: ICD-10-CM

## 2022-04-05 LAB
ANION GAP SERPL CALC-SCNC: 3 MMOL/L (ref 0–18)
ATRIAL RATE: 65 BPM
BASOPHILS # BLD AUTO: 0.05 X10(3) UL (ref 0–0.2)
BASOPHILS NFR BLD AUTO: 0.7 %
BUN BLD-MCNC: 11 MG/DL (ref 7–18)
CALCIUM BLD-MCNC: 9.4 MG/DL (ref 8.5–10.1)
CHLORIDE SERPL-SCNC: 111 MMOL/L (ref 98–112)
CO2 SERPL-SCNC: 27 MMOL/L (ref 21–32)
CREAT BLD-MCNC: 0.82 MG/DL
EOSINOPHIL # BLD AUTO: 0.27 X10(3) UL (ref 0–0.7)
EOSINOPHIL NFR BLD AUTO: 3.7 %
ERYTHROCYTE [DISTWIDTH] IN BLOOD BY AUTOMATED COUNT: 12.3 %
GLUCOSE BLD-MCNC: 113 MG/DL (ref 70–99)
GLUCOSE BLD-MCNC: 116 MG/DL (ref 70–99)
HCT VFR BLD AUTO: 40.8 %
HGB BLD-MCNC: 13.9 G/DL
IMM GRANULOCYTES # BLD AUTO: 0.01 X10(3) UL (ref 0–1)
IMM GRANULOCYTES NFR BLD: 0.1 %
ISTAT ACTIVATED CLOTTING TIME: 356 SECONDS (ref 74–137)
LYMPHOCYTES # BLD AUTO: 2.36 X10(3) UL (ref 1–4)
LYMPHOCYTES NFR BLD AUTO: 32.2 %
MCH RBC QN AUTO: 31 PG (ref 26–34)
MCHC RBC AUTO-ENTMCNC: 34.1 G/DL (ref 31–37)
MCV RBC AUTO: 91.1 FL
MONOCYTES # BLD AUTO: 0.58 X10(3) UL (ref 0.1–1)
MONOCYTES NFR BLD AUTO: 7.9 %
NEUTROPHILS # BLD AUTO: 4.05 X10 (3) UL (ref 1.5–7.7)
NEUTROPHILS # BLD AUTO: 4.05 X10(3) UL (ref 1.5–7.7)
NEUTROPHILS NFR BLD AUTO: 55.4 %
OSMOLALITY SERPL CALC.SUM OF ELEC: 292 MOSM/KG (ref 275–295)
P AXIS: 61 DEGREES
P-R INTERVAL: 156 MS
PLATELET # BLD AUTO: 206 10(3)UL (ref 150–450)
POTASSIUM SERPL-SCNC: 3.6 MMOL/L (ref 3.5–5.1)
Q-T INTERVAL: 460 MS
QRS DURATION: 144 MS
QTC CALCULATION (BEZET): 478 MS
R AXIS: 120 DEGREES
RBC # BLD AUTO: 4.48 X10(6)UL
SODIUM SERPL-SCNC: 141 MMOL/L (ref 136–145)
T AXIS: 4 DEGREES
VENTRICULAR RATE: 65 BPM
WBC # BLD AUTO: 7.3 X10(3) UL (ref 4–11)

## 2022-04-05 PROCEDURE — 85025 COMPLETE CBC W/AUTO DIFF WBC: CPT | Performed by: INTERNAL MEDICINE

## 2022-04-05 PROCEDURE — 02703ZZ DILATION OF CORONARY ARTERY, ONE ARTERY, PERCUTANEOUS APPROACH: ICD-10-PCS | Performed by: INTERNAL MEDICINE

## 2022-04-05 PROCEDURE — 82962 GLUCOSE BLOOD TEST: CPT

## 2022-04-05 PROCEDURE — 92978 ENDOLUMINL IVUS OCT C 1ST: CPT

## 2022-04-05 PROCEDURE — 93010 ELECTROCARDIOGRAM REPORT: CPT | Performed by: INTERNAL MEDICINE

## 2022-04-05 PROCEDURE — 99211 OFF/OP EST MAY X REQ PHY/QHP: CPT

## 2022-04-05 PROCEDURE — 99153 MOD SED SAME PHYS/QHP EA: CPT

## 2022-04-05 PROCEDURE — B240ZZ3 ULTRASONOGRAPHY OF SINGLE CORONARY ARTERY, INTRAVASCULAR: ICD-10-PCS | Performed by: INTERNAL MEDICINE

## 2022-04-05 PROCEDURE — 85347 COAGULATION TIME ACTIVATED: CPT

## 2022-04-05 PROCEDURE — 93005 ELECTROCARDIOGRAM TRACING: CPT

## 2022-04-05 PROCEDURE — 36415 COLL VENOUS BLD VENIPUNCTURE: CPT

## 2022-04-05 PROCEDURE — 92943 PRQ TRLUML REVSC CH OCC ANT: CPT

## 2022-04-05 PROCEDURE — 99152 MOD SED SAME PHYS/QHP 5/>YRS: CPT

## 2022-04-05 PROCEDURE — 80048 BASIC METABOLIC PNL TOTAL CA: CPT | Performed by: INTERNAL MEDICINE

## 2022-04-05 RX ORDER — SODIUM CHLORIDE 9 MG/ML
INJECTION, SOLUTION INTRAVENOUS
Status: DISCONTINUED | OUTPATIENT
Start: 2022-04-06 | End: 2022-04-05 | Stop reason: HOSPADM

## 2022-04-05 RX ORDER — LIDOCAINE HYDROCHLORIDE 10 MG/ML
INJECTION, SOLUTION EPIDURAL; INFILTRATION; INTRACAUDAL; PERINEURAL
Status: COMPLETED
Start: 2022-04-05 | End: 2022-04-05

## 2022-04-05 RX ORDER — ASPIRIN 81 MG/1
TABLET, CHEWABLE ORAL
Status: COMPLETED
Start: 2022-04-05 | End: 2022-04-05

## 2022-04-05 RX ORDER — HEPARIN SODIUM 5000 [USP'U]/ML
INJECTION, SOLUTION INTRAVENOUS; SUBCUTANEOUS
Status: COMPLETED
Start: 2022-04-05 | End: 2022-04-05

## 2022-04-05 RX ORDER — CLOPIDOGREL BISULFATE 75 MG/1
75 TABLET ORAL DAILY
Status: DISCONTINUED | OUTPATIENT
Start: 2022-04-06 | End: 2022-04-05

## 2022-04-05 RX ORDER — CLOPIDOGREL BISULFATE 75 MG/1
TABLET ORAL
Status: COMPLETED
Start: 2022-04-05 | End: 2022-04-05

## 2022-04-05 RX ORDER — NITROGLYCERIN 20 MG/100ML
INJECTION INTRAVENOUS
Status: COMPLETED
Start: 2022-04-05 | End: 2022-04-05

## 2022-04-05 RX ORDER — ASPIRIN 81 MG/1
81 TABLET ORAL DAILY
Status: DISCONTINUED | OUTPATIENT
Start: 2022-04-06 | End: 2022-04-05

## 2022-04-05 RX ORDER — SODIUM CHLORIDE 9 MG/ML
INJECTION, SOLUTION INTRAVENOUS CONTINUOUS
Status: DISCONTINUED | OUTPATIENT
Start: 2022-04-05 | End: 2022-04-05

## 2022-04-05 RX ORDER — MIDAZOLAM HYDROCHLORIDE 1 MG/ML
INJECTION INTRAMUSCULAR; INTRAVENOUS
Status: COMPLETED
Start: 2022-04-05 | End: 2022-04-05

## 2022-04-05 RX ADMIN — SODIUM CHLORIDE: 9 INJECTION, SOLUTION INTRAVENOUS at 14:15:00

## 2022-04-05 NOTE — PLAN OF CARE
Patient ambulated in trevino, tolerated well. Groins stable. Dr. Mart Ames @ bedside to talk with patient and wife. Discharge instructions reviewed. Cardiac rehab @ bedside to talk with patient. Report given to Julieth Olivo RN.

## 2022-04-05 NOTE — PROGRESS NOTES
Report received from [facility], right groin assessed soft c/d/i. AVS given to patient per prior RN. Cardiac rehab has spoken with patient/family. Tele dc'd, IV dc'd catheter intact. Pt discharged via w/c in stable condition.

## 2022-04-05 NOTE — PLAN OF CARE
Patient had elective PCI to  of RCA. Bilateral groins soft, CDI, no hematoma, +1 pedal pulses. VSS. Patient's wife @ bedside. Patient voided 700cc. Flat bedrest completed. HOB elevated. Groin stable. Patient is tolerating po intake. 12 lead EKG completed. Will continue to monitor.

## 2022-04-05 NOTE — PROCEDURES
Elective PCI  RCA  Re-entry into distal PL  POBA with 2.0, 2.5 and 3.0 balloon  Perclose LFA  Perclose RFA    Home today  Back in 4 weeks for potential stenting

## 2022-04-05 NOTE — CARDIAC REHAB
CAD education completed with patient and spouse. Patient referred to outpatient cardiac rehab at [facility] at [phone].

## 2022-04-06 NOTE — PROCEDURES
[facility]    PATIENT'S NAME: Paulo SAM   ATTENDING PHYSICIAN: Quinton Gomez M.D. OPERATING PHYSICIAN: Tano Velazco MD   PATIENT ACCOUNT#:   [de-identified]    LOCATION:  [facility]  MEDICAL RECORD #:   [id number]       YOB: 1965  ADMISSION DATE:       04/05/2022      OPERATION DATE:  04/05/2022    CARDIAC PROCEDURE TRANSCRIPTION    BALLOON ANGIOPLASTY    PREOPERATIVE DIAGNOSIS:    1. Coronary artery disease. 2.   Angina. POSTOPERATIVE DIAGNOSIS:    1. Coronary artery disease. 2.   Angina. PROCEDURE PERFORMED:    1.   Balloon angioplasty of chronic totally occluded right coronary artery. 2.   IVUS of the right coronary artery. PROCEDURAL DETAILS:  After obtaining informed consent, we obtained access in the right femoral artery using micropuncture technique and placed a 6-South African sheath. We then obtained access in the left femoral artery using micropuncture technique and placed a 7-[place] African sheath. Multipurpose guide was brought in and used to engage the occluded saphenous vein graft to the right coronary artery. Corsair catheter and  200 were brought antegrade into the saphenous vein graft and used to cross the occluded vein graft into the true lumen distally. This was confirmed with wire manipulation as well as tip injection. An AL 7.5 guide was then brought antegrade and used to engage the right coronary artery. Corsair catheter and  200 wire were brought antegrade and used to cross the proximal cap and wired down next to the Corsair catheter from the saphenous vein graft. Angiography suggested that there was a fair distance with unlikely re-entry into the true lumen.  200 wire was removed and replaced with a Mongo wire. Mongo wire was then used to knuckle into the true lumen in the distal portion of the PL. This was followed by balloon angioplasty with a 2.0 balloon.   After balloon angioplasty of the entire right coronary artery, intravascular ultrasound demonstrated true lumen entry in the distal portion of the posterolateral branch and, at this point, it was decided not to do stenting. The right coronary artery was then ballooned with a 2.5 balloon in the mid and distal portion and a 3.0 balloon in the proximal portion. Angiography showed approximately 30% to 40% residual stenosis with ALEX 3 flow into the posterolateral branch. Both guides were removed over a wire. Perclose devise was deployed in the left femoral artery for hemostasis. Perclose devise was deployed in the right femoral artery for hemostasis, and the patient was transferred to Recovery in stable condition. COMPLICATIONS:  None. ESTIMATED BLOOD LOSS:  5 mL. CONCLUSIONS:  Successful balloon angioplasty of a chronic totally occluded right coronary artery with reduction of stenosis from 100% to 30% and improvement of flow from ALEX 0 to ALEX 2.     Dictated By Jazmine Samuels MD  d: 04/05/2022 16:56:20  t: 04/06/2022 08:56:36  Job [id number]  IF/

## 2022-04-11 ENCOUNTER — APPOINTMENT (OUTPATIENT)
Dept: CARDIOLOGY | Age: 57
End: 2022-04-11

## 2022-05-16 ENCOUNTER — APPOINTMENT (OUTPATIENT)
Dept: CARDIOLOGY | Age: 57
End: 2022-05-16

## 2022-07-03 ENCOUNTER — LAB ENCOUNTER (OUTPATIENT)
Dept: LAB | Facility: HOSPITAL | Age: 57
End: 2022-07-03
Attending: INTERNAL MEDICINE
Payer: COMMERCIAL

## 2022-07-03 DIAGNOSIS — I20.9 ANGINA, CLASS III (HCC): ICD-10-CM

## 2022-07-03 DIAGNOSIS — I25.719 CORONARY ARTERY DISEASE INVOLVING AUTOLOGOUS VEIN CORONARY BYPASS GRAFT WITH ANGINA PECTORIS (HCC): ICD-10-CM

## 2022-07-03 DIAGNOSIS — I21.9 ACUTE MYOCARDIAL INFARCTION (HCC): ICD-10-CM

## 2022-07-03 DIAGNOSIS — I21.9 ACUTE MYOCARDIAL INFARCTION (HCC): Primary | ICD-10-CM

## 2022-07-03 DIAGNOSIS — Z01.818 PREOPERATIVE TESTING: ICD-10-CM

## 2022-07-03 LAB
ANION GAP SERPL CALC-SCNC: 8 MMOL/L (ref 0–18)
BASOPHILS # BLD AUTO: 0.05 X10(3) UL (ref 0–0.2)
BASOPHILS NFR BLD AUTO: 0.7 %
BUN BLD-MCNC: 19 MG/DL (ref 7–18)
BUN/CREAT SERPL: 16.4 (ref 10–20)
CALCIUM BLD-MCNC: 9.6 MG/DL (ref 8.5–10.1)
CHLORIDE SERPL-SCNC: 107 MMOL/L (ref 98–112)
CO2 SERPL-SCNC: 26 MMOL/L (ref 21–32)
CREAT BLD-MCNC: 1.16 MG/DL
DEPRECATED RDW RBC AUTO: 43.2 FL (ref 35.1–46.3)
EOSINOPHIL # BLD AUTO: 0.2 X10(3) UL (ref 0–0.7)
EOSINOPHIL NFR BLD AUTO: 3 %
ERYTHROCYTE [DISTWIDTH] IN BLOOD BY AUTOMATED COUNT: 12.7 % (ref 11–15)
FASTING STATUS PATIENT QL REPORTED: YES
GLUCOSE BLD-MCNC: 249 MG/DL (ref 70–99)
HCT VFR BLD AUTO: 42.2 %
HGB BLD-MCNC: 14.3 G/DL
IMM GRANULOCYTES # BLD AUTO: 0.01 X10(3) UL (ref 0–1)
IMM GRANULOCYTES NFR BLD: 0.1 %
LYMPHOCYTES # BLD AUTO: 3.05 X10(3) UL (ref 1–4)
LYMPHOCYTES NFR BLD AUTO: 45.3 %
MCH RBC QN AUTO: 31.3 PG (ref 26–34)
MCHC RBC AUTO-ENTMCNC: 33.9 G/DL (ref 31–37)
MCV RBC AUTO: 92.3 FL
MONOCYTES # BLD AUTO: 0.48 X10(3) UL (ref 0.1–1)
MONOCYTES NFR BLD AUTO: 7.1 %
NEUTROPHILS # BLD AUTO: 2.95 X10 (3) UL (ref 1.5–7.7)
NEUTROPHILS # BLD AUTO: 2.95 X10(3) UL (ref 1.5–7.7)
NEUTROPHILS NFR BLD AUTO: 43.8 %
OSMOLALITY SERPL CALC.SUM OF ELEC: 303 MOSM/KG (ref 275–295)
PLATELET # BLD AUTO: 208 10(3)UL (ref 150–450)
POTASSIUM SERPL-SCNC: 4.3 MMOL/L (ref 3.5–5.1)
RBC # BLD AUTO: 4.57 X10(6)UL
SARS-COV-2 RNA RESP QL NAA+PROBE: NOT DETECTED
SODIUM SERPL-SCNC: 141 MMOL/L (ref 136–145)
WBC # BLD AUTO: 6.7 X10(3) UL (ref 4–11)

## 2022-07-03 PROCEDURE — 85025 COMPLETE CBC W/AUTO DIFF WBC: CPT

## 2022-07-03 PROCEDURE — 80048 BASIC METABOLIC PNL TOTAL CA: CPT

## 2022-07-03 PROCEDURE — 36415 COLL VENOUS BLD VENIPUNCTURE: CPT

## 2022-07-06 ENCOUNTER — HOSPITAL ENCOUNTER (OUTPATIENT)
Dept: INTERVENTIONAL RADIOLOGY/VASCULAR | Facility: HOSPITAL | Age: 57
Discharge: HOME | End: 2022-07-06
Attending: INTERNAL MEDICINE | Admitting: INTERNAL MEDICINE
Payer: COMMERCIAL

## 2022-07-06 VITALS
WEIGHT: 168 LBS | RESPIRATION RATE: 20 BRPM | HEART RATE: 59 BPM | TEMPERATURE: 97 F | HEIGHT: 64 IN | BODY MASS INDEX: 28.68 KG/M2 | OXYGEN SATURATION: 100 % | SYSTOLIC BLOOD PRESSURE: 155 MMHG | DIASTOLIC BLOOD PRESSURE: 99 MMHG

## 2022-07-06 DIAGNOSIS — I25.10 CAD (CORONARY ARTERY DISEASE): ICD-10-CM

## 2022-07-06 DIAGNOSIS — I25.10 CHRONIC TOTAL OCCLUSION OF NATIVE CORONARY ARTERY: ICD-10-CM

## 2022-07-06 DIAGNOSIS — I25.82 CHRONIC TOTAL OCCLUSION OF NATIVE CORONARY ARTERY: ICD-10-CM

## 2022-07-06 LAB — GLUCOSE BLD-MCNC: 211 MG/DL (ref 70–99)

## 2022-07-06 PROCEDURE — 93458 L HRT ARTERY/VENTRICLE ANGIO: CPT | Performed by: INTERNAL MEDICINE

## 2022-07-06 PROCEDURE — B2151ZZ FLUOROSCOPY OF LEFT HEART USING LOW OSMOLAR CONTRAST: ICD-10-PCS | Performed by: INTERNAL MEDICINE

## 2022-07-06 PROCEDURE — B2111ZZ FLUOROSCOPY OF MULTIPLE CORONARY ARTERIES USING LOW OSMOLAR CONTRAST: ICD-10-PCS | Performed by: INTERNAL MEDICINE

## 2022-07-06 PROCEDURE — 82962 GLUCOSE BLOOD TEST: CPT

## 2022-07-06 PROCEDURE — 4A023N7 MEASUREMENT OF CARDIAC SAMPLING AND PRESSURE, LEFT HEART, PERCUTANEOUS APPROACH: ICD-10-PCS | Performed by: INTERNAL MEDICINE

## 2022-07-06 PROCEDURE — 99152 MOD SED SAME PHYS/QHP 5/>YRS: CPT | Performed by: INTERNAL MEDICINE

## 2022-07-06 RX ORDER — MIDAZOLAM HYDROCHLORIDE 1 MG/ML
INJECTION INTRAMUSCULAR; INTRAVENOUS
Status: COMPLETED
Start: 2022-07-06 | End: 2022-07-06

## 2022-07-06 RX ORDER — SODIUM CHLORIDE 9 MG/ML
INJECTION, SOLUTION INTRAVENOUS
Status: DISCONTINUED | OUTPATIENT
Start: 2022-07-07 | End: 2022-07-06 | Stop reason: HOSPADM

## 2022-07-06 RX ORDER — HEPARIN SODIUM 5000 [USP'U]/ML
INJECTION, SOLUTION INTRAVENOUS; SUBCUTANEOUS
Status: COMPLETED
Start: 2022-07-06 | End: 2022-07-06

## 2022-07-06 RX ORDER — LIDOCAINE HYDROCHLORIDE 10 MG/ML
INJECTION, SOLUTION EPIDURAL; INFILTRATION; INTRACAUDAL; PERINEURAL
Status: COMPLETED
Start: 2022-07-06 | End: 2022-07-06

## 2022-07-06 RX ORDER — MIDAZOLAM HYDROCHLORIDE 1 MG/ML
INJECTION INTRAMUSCULAR; INTRAVENOUS
Status: DISCONTINUED
Start: 2022-07-06 | End: 2022-07-06 | Stop reason: WASHOUT

## 2022-07-06 NOTE — PROGRESS NOTES
Pt is able to sit up and ambulate without difficulty post cardiac cath. Pt's vital signs are stable. Right groin procedural access site is dry and intact with no signs and symptoms of bleeding or hematoma. Pt tolerated food/fluids. Instructions provided and pt/family verbalized understanding. PIV removed. Dr. Kalin Dumont spoke to patient and wife. All questions answered. Patient discharged in wheelchair with all belongings. Wife driving home.

## 2022-07-07 NOTE — PROCEDURES
[facility]    PATIENT'S NAME: Bartolome SAM   ATTENDING PHYSICIAN: Lina Phelps M.D. OPERATING PHYSICIAN: Lina Phelps M.D. PATIENT ACCOUNT#:   [de-identified]    LOCATION:  [address]  MEDICAL RECORD #:   [id number]       YOB: 1965  ADMISSION DATE:       07/06/2022      OPERATION DATE:  07/06/2022    CARDIAC PROCEDURE TRANSCRIPTION    CARDIAC CATHETERIZATION    PREOPERATIVE DIAGNOSIS:    POSTOPERATIVE DIAGNOSIS:    PROCEDURE PERFORMED:      INDICATIONS:  This is a 51-year-old male who had an ejection fraction of 10% post bypass surgery. He has complex CHIP, Impella based, left main, circumflex, and ramus, and also see to his right, for restudy for abnormal PET. DESCRIPTION OF PROCEDURE:  The right groin was anesthetized. LV angiogram revealed an improvement in ejection fraction to about 20% to 25%. LVEDP was 4. Left coronary arteriography:  Left main stent to LAD stent is normal.  There is about a 90% in-stent restenosis at the circumflex and ramus. This is on the bend within the circumflex. Right coronary arteriography:  Right coronary artery is patent. In the mid portion diffusely diseased, but no flow distally. RECOMMENDATIONS:    1. Laser PCI of the circumflex and ramus. 2.   Discuss options for the right intervention.     Dictated By Lina Phelps M.D.  d: 07/06/2022 14:09:52  t: 07/06/2022 14:41:28  [facility] [id number]  [facility]/

## 2022-07-18 ENCOUNTER — ORDER TRANSCRIPTION (OUTPATIENT)
Dept: ADMINISTRATIVE | Facility: HOSPITAL | Age: 57
End: 2022-07-18

## 2022-07-18 DIAGNOSIS — Z11.59 ENCOUNTER FOR SCREENING FOR OTHER VIRAL DISEASES: ICD-10-CM

## 2022-07-18 DIAGNOSIS — Z01.818 PREPROCEDURAL EXAMINATION: Primary | ICD-10-CM

## 2022-07-19 ENCOUNTER — HOSPITAL ENCOUNTER (INPATIENT)
Facility: HOSPITAL | Age: 57
LOS: 1 days | Discharge: HOME | End: 2022-07-20
Attending: EMERGENCY MEDICINE | Admitting: HOSPITALIST
Payer: COMMERCIAL

## 2022-07-19 ENCOUNTER — APPOINTMENT (OUTPATIENT)
Dept: GENERAL RADIOLOGY | Facility: HOSPITAL | Age: 57
End: 2022-07-19
Attending: EMERGENCY MEDICINE
Payer: COMMERCIAL

## 2022-07-19 DIAGNOSIS — E87.79 CARDIAC VOLUME OVERLOAD: ICD-10-CM

## 2022-07-19 DIAGNOSIS — R07.9 CHEST PAIN, UNSPECIFIED TYPE: Primary | ICD-10-CM

## 2022-07-19 LAB
ALBUMIN SERPL-MCNC: 3.8 G/DL (ref 3.4–5)
ALBUMIN/GLOB SERPL: 0.9 {RATIO} (ref 1–2)
ALP LIVER SERPL-CCNC: 105 U/L
ALT SERPL-CCNC: 23 U/L
ANION GAP SERPL CALC-SCNC: 4 MMOL/L (ref 0–18)
APTT PPP: 32.3 SECONDS (ref 23.3–35.6)
APTT PPP: 61 SECONDS (ref 23.3–35.6)
AST SERPL-CCNC: 19 U/L (ref 15–37)
ATRIAL RATE: 81 BPM
ATRIAL RATE: 96 BPM
BASOPHILS # BLD AUTO: 0.06 X10(3) UL (ref 0–0.2)
BASOPHILS NFR BLD AUTO: 0.8 %
BILIRUB SERPL-MCNC: 0.5 MG/DL (ref 0.1–2)
BUN BLD-MCNC: 14 MG/DL (ref 7–18)
CALCIUM BLD-MCNC: 9.7 MG/DL (ref 8.5–10.1)
CHLORIDE SERPL-SCNC: 108 MMOL/L (ref 98–112)
CO2 SERPL-SCNC: 25 MMOL/L (ref 21–32)
CREAT BLD-MCNC: 1.01 MG/DL
EOSINOPHIL # BLD AUTO: 0.26 X10(3) UL (ref 0–0.7)
EOSINOPHIL NFR BLD AUTO: 3.4 %
ERYTHROCYTE [DISTWIDTH] IN BLOOD BY AUTOMATED COUNT: 12.2 %
ERYTHROCYTE [DISTWIDTH] IN BLOOD BY AUTOMATED COUNT: 12.4 %
EST. AVERAGE GLUCOSE BLD GHB EST-MCNC: 229 MG/DL (ref 68–126)
GLOBULIN PLAS-MCNC: 4.4 G/DL (ref 2.8–4.4)
GLUCOSE BLD-MCNC: 247 MG/DL (ref 70–99)
GLUCOSE BLD-MCNC: 265 MG/DL (ref 70–99)
GLUCOSE BLD-MCNC: 414 MG/DL (ref 70–99)
HBA1C MFR BLD: 9.6 % (ref ?–5.7)
HCT VFR BLD AUTO: 46.4 %
HCT VFR BLD AUTO: 50.7 %
HGB BLD-MCNC: 16.7 G/DL
HGB BLD-MCNC: 17.5 G/DL
IMM GRANULOCYTES # BLD AUTO: 0.02 X10(3) UL (ref 0–1)
IMM GRANULOCYTES NFR BLD: 0.3 %
LYMPHOCYTES # BLD AUTO: 3.64 X10(3) UL (ref 1–4)
LYMPHOCYTES NFR BLD AUTO: 48.2 %
MCH RBC QN AUTO: 31.2 PG (ref 26–34)
MCH RBC QN AUTO: 32.2 PG (ref 26–34)
MCHC RBC AUTO-ENTMCNC: 34.5 G/DL (ref 31–37)
MCHC RBC AUTO-ENTMCNC: 36 G/DL (ref 31–37)
MCV RBC AUTO: 89.4 FL
MCV RBC AUTO: 90.4 FL
MONOCYTES # BLD AUTO: 0.57 X10(3) UL (ref 0.1–1)
MONOCYTES NFR BLD AUTO: 7.5 %
NEUTROPHILS # BLD AUTO: 3 X10 (3) UL (ref 1.5–7.7)
NEUTROPHILS # BLD AUTO: 3 X10(3) UL (ref 1.5–7.7)
NEUTROPHILS NFR BLD AUTO: 39.8 %
NT-PROBNP SERPL-MCNC: 215 PG/ML (ref ?–125)
OSMOLALITY SERPL CALC.SUM OF ELEC: 293 MOSM/KG (ref 275–295)
P AXIS: 45 DEGREES
P AXIS: 58 DEGREES
P-R INTERVAL: 150 MS
P-R INTERVAL: 172 MS
PLATELET # BLD AUTO: 209 10(3)UL (ref 150–450)
PLATELET # BLD AUTO: 216 10(3)UL (ref 150–450)
POTASSIUM SERPL-SCNC: 3.8 MMOL/L (ref 3.5–5.1)
PROT SERPL-MCNC: 8.2 G/DL (ref 6.4–8.2)
Q-T INTERVAL: 388 MS
Q-T INTERVAL: 400 MS
QRS DURATION: 130 MS
QRS DURATION: 138 MS
QTC CALCULATION (BEZET): 464 MS
QTC CALCULATION (BEZET): 490 MS
R AXIS: 115 DEGREES
R AXIS: 135 DEGREES
RBC # BLD AUTO: 5.19 X10(6)UL
RBC # BLD AUTO: 5.61 X10(6)UL
SARS-COV-2 RNA RESP QL NAA+PROBE: NOT DETECTED
SODIUM SERPL-SCNC: 137 MMOL/L (ref 136–145)
T AXIS: 29 DEGREES
T AXIS: 43 DEGREES
TROPONIN I HIGH SENSITIVITY: 28 NG/L
VENTRICULAR RATE: 81 BPM
VENTRICULAR RATE: 96 BPM
WBC # BLD AUTO: 7.5 X10(3) UL (ref 4–11)
WBC # BLD AUTO: 7.6 X10(3) UL (ref 4–11)

## 2022-07-19 PROCEDURE — 71045 X-RAY EXAM CHEST 1 VIEW: CPT | Performed by: EMERGENCY MEDICINE

## 2022-07-19 PROCEDURE — 99223 1ST HOSP IP/OBS HIGH 75: CPT | Performed by: HOSPITALIST

## 2022-07-19 RX ORDER — ONDANSETRON 2 MG/ML
4 INJECTION INTRAMUSCULAR; INTRAVENOUS EVERY 6 HOURS PRN
Status: DISCONTINUED | OUTPATIENT
Start: 2022-07-19 | End: 2022-07-20

## 2022-07-19 RX ORDER — NICOTINE POLACRILEX 4 MG
15 LOZENGE BUCCAL
Status: DISCONTINUED | OUTPATIENT
Start: 2022-07-19 | End: 2022-07-20

## 2022-07-19 RX ORDER — ISOSORBIDE MONONITRATE 30 MG/1
30 TABLET, EXTENDED RELEASE ORAL DAILY
Status: DISCONTINUED | OUTPATIENT
Start: 2022-07-19 | End: 2022-07-19

## 2022-07-19 RX ORDER — HEPARIN SODIUM 1000 [USP'U]/ML
60 INJECTION, SOLUTION INTRAVENOUS; SUBCUTANEOUS ONCE
Status: COMPLETED | OUTPATIENT
Start: 2022-07-19 | End: 2022-07-19

## 2022-07-19 RX ORDER — NICOTINE POLACRILEX 4 MG
30 LOZENGE BUCCAL
Status: DISCONTINUED | OUTPATIENT
Start: 2022-07-19 | End: 2022-07-20

## 2022-07-19 RX ORDER — ASPIRIN 325 MG
325 TABLET ORAL DAILY
Status: ON HOLD | COMMUNITY

## 2022-07-19 RX ORDER — ASPIRIN 81 MG/1
162 TABLET, CHEWABLE ORAL DAILY
Status: DISCONTINUED | OUTPATIENT
Start: 2022-07-19 | End: 2022-07-20

## 2022-07-19 RX ORDER — ATORVASTATIN CALCIUM 80 MG/1
80 TABLET, FILM COATED ORAL NIGHTLY
Status: DISCONTINUED | OUTPATIENT
Start: 2022-07-19 | End: 2022-07-20

## 2022-07-19 RX ORDER — ASPIRIN 325 MG
325 TABLET ORAL DAILY
Status: DISCONTINUED | OUTPATIENT
Start: 2022-07-19 | End: 2022-07-19

## 2022-07-19 RX ORDER — HEPARIN SODIUM AND DEXTROSE 10000; 5 [USP'U]/100ML; G/100ML
12 INJECTION INTRAVENOUS ONCE
Status: COMPLETED | OUTPATIENT
Start: 2022-07-19 | End: 2022-07-19

## 2022-07-19 RX ORDER — NITROGLYCERIN 20 MG/100ML
INJECTION INTRAVENOUS CONTINUOUS
Status: DISCONTINUED | OUTPATIENT
Start: 2022-07-19 | End: 2022-07-19

## 2022-07-19 RX ORDER — FENOFIBRATE 134 MG/1
134 CAPSULE ORAL
Refills: 3 | Status: DISCONTINUED | OUTPATIENT
Start: 2022-07-20 | End: 2022-07-20

## 2022-07-19 RX ORDER — ACETAMINOPHEN 500 MG
500 TABLET ORAL EVERY 4 HOURS PRN
Status: DISCONTINUED | OUTPATIENT
Start: 2022-07-19 | End: 2022-07-20

## 2022-07-19 RX ORDER — DEXTROSE MONOHYDRATE 25 G/50ML
50 INJECTION, SOLUTION INTRAVENOUS
Status: DISCONTINUED | OUTPATIENT
Start: 2022-07-19 | End: 2022-07-20

## 2022-07-19 RX ORDER — NITROGLYCERIN 0.4 MG/1
0.4 TABLET SUBLINGUAL EVERY 5 MIN PRN
Status: DISCONTINUED | OUTPATIENT
Start: 2022-07-19 | End: 2022-07-20

## 2022-07-19 RX ORDER — LOSARTAN POTASSIUM 25 MG/1
25 TABLET ORAL DAILY
Status: DISCONTINUED | OUTPATIENT
Start: 2022-07-19 | End: 2022-07-20

## 2022-07-19 RX ORDER — CLOPIDOGREL BISULFATE 75 MG/1
75 TABLET ORAL DAILY
Status: DISCONTINUED | OUTPATIENT
Start: 2022-07-19 | End: 2022-07-20

## 2022-07-19 RX ORDER — FUROSEMIDE 10 MG/ML
40 INJECTION INTRAMUSCULAR; INTRAVENOUS ONCE
Status: COMPLETED | OUTPATIENT
Start: 2022-07-19 | End: 2022-07-19

## 2022-07-19 RX ORDER — ISOSORBIDE MONONITRATE 60 MG/1
60 TABLET, EXTENDED RELEASE ORAL DAILY
Status: DISCONTINUED | OUTPATIENT
Start: 2022-07-20 | End: 2022-07-20

## 2022-07-19 RX ORDER — PROCHLORPERAZINE EDISYLATE 5 MG/ML
5 INJECTION INTRAMUSCULAR; INTRAVENOUS EVERY 8 HOURS PRN
Status: DISCONTINUED | OUTPATIENT
Start: 2022-07-19 | End: 2022-07-20

## 2022-07-19 RX ORDER — HEPARIN SODIUM AND DEXTROSE 10000; 5 [USP'U]/100ML; G/100ML
INJECTION INTRAVENOUS CONTINUOUS
Status: DISCONTINUED | OUTPATIENT
Start: 2022-07-19 | End: 2022-07-19

## 2022-07-19 RX ORDER — FUROSEMIDE 10 MG/ML
20 INJECTION INTRAMUSCULAR; INTRAVENOUS ONCE
Status: COMPLETED | OUTPATIENT
Start: 2022-07-19 | End: 2022-07-19

## 2022-07-19 RX ORDER — EZETIMIBE 10 MG/1
10 TABLET ORAL NIGHTLY
Status: DISCONTINUED | OUTPATIENT
Start: 2022-07-19 | End: 2022-07-20

## 2022-07-19 NOTE — PLAN OF CARE
Re-visit this afternoon    No further chest pain and initial troponin negative. Lungs still with bibasilar crackles. Discussed with my colleague Dr. Chasidy Kim and will keep plan for complex PCI procedure scheduled for next week. Will stop IV heparin, give another dose of IV lasix 20 mg now. Will increase imdur to 60 mg daily. Will monitor BP and adjust anti-hypertensive medications as needed. Anticipate discharge tomorrow if continues to be stable clinically.     Alysia Grover MD

## 2022-07-19 NOTE — ED QUICK NOTES
Orders for admission, patient is aware of plan and ready to go upstairs. Any questions, please call ED RN Brenda Vigil at extension [phone].      Patient Covid vaccination status: Unvaccinated     COVID Test Ordered in ED: Rapid SARS-CoV-2 by PCR    COVID Suspicion at Admission: Low clinical suspicion for COVID    Running Infusions:      Mental Status/LOC at time of transport: A/Ox3    Other pertinent information:   CIWA score: N/A   NIH score:  N/A

## 2022-07-19 NOTE — PLAN OF CARE
Rec via cart from ER pt and VS appear stable alert  Denies CP/SOB heparin gtt infusing per protocol. Admission database completed meds per order. POC updated with pt and family at bedside. Endorsed to 3P RN to f/u with admit meds as needed pt asleep and appears stable at time of this note. Cards APN aware of admit.   Levemir pen unavailable from Rx endorsed to 3P RN to f/u once pen sent up

## 2022-07-19 NOTE — ED INITIAL ASSESSMENT (HPI)
Pt to the ER via walk in from OP PET scan. Pt was ad HTN. Pt was able to finish scan and then began to have worsening chest pain and increased blood pressure     Pt given nitroglycerinx2 last one at 0940.     Pt presents a/ox4

## 2022-07-19 NOTE — PROGRESS NOTES
Assumed care at . Pt A&Ox4. Denies pain at this time. NSR with bbb on tele. Heparin gtt therapeutic at 9ml/hr per ACS protocol. Lungs with bilat crackles on RA. Last bm 07/18. Pt voids independently. Son at bedside. Updated on POC. Pt eating meal currently. Will continue to monitor.

## 2022-07-20 VITALS
HEART RATE: 63 BPM | OXYGEN SATURATION: 100 % | WEIGHT: 166.38 LBS | DIASTOLIC BLOOD PRESSURE: 61 MMHG | BODY MASS INDEX: 29 KG/M2 | TEMPERATURE: 98 F | SYSTOLIC BLOOD PRESSURE: 114 MMHG | RESPIRATION RATE: 18 BRPM

## 2022-07-20 LAB
ALBUMIN SERPL-MCNC: 3.3 G/DL (ref 3.4–5)
ALBUMIN/GLOB SERPL: 0.8 {RATIO} (ref 1–2)
ALP LIVER SERPL-CCNC: 85 U/L
ALT SERPL-CCNC: 23 U/L
ANION GAP SERPL CALC-SCNC: 3 MMOL/L (ref 0–18)
AST SERPL-CCNC: 25 U/L (ref 15–37)
BASOPHILS # BLD AUTO: 0.06 X10(3) UL (ref 0–0.2)
BASOPHILS NFR BLD AUTO: 0.9 %
BILIRUB SERPL-MCNC: 0.6 MG/DL (ref 0.1–2)
BUN BLD-MCNC: 19 MG/DL (ref 7–18)
CALCIUM BLD-MCNC: 9.6 MG/DL (ref 8.5–10.1)
CHLORIDE SERPL-SCNC: 108 MMOL/L (ref 98–112)
CHOLEST SERPL-MCNC: 234 MG/DL (ref ?–200)
CO2 SERPL-SCNC: 28 MMOL/L (ref 21–32)
CREAT BLD-MCNC: 0.96 MG/DL
EOSINOPHIL # BLD AUTO: 0.26 X10(3) UL (ref 0–0.7)
EOSINOPHIL NFR BLD AUTO: 3.9 %
ERYTHROCYTE [DISTWIDTH] IN BLOOD BY AUTOMATED COUNT: 12.2 %
GLOBULIN PLAS-MCNC: 4 G/DL (ref 2.8–4.4)
GLUCOSE BLD-MCNC: 171 MG/DL (ref 70–99)
GLUCOSE BLD-MCNC: 175 MG/DL (ref 70–99)
GLUCOSE BLD-MCNC: 209 MG/DL (ref 70–99)
GLUCOSE BLD-MCNC: 232 MG/DL (ref 70–99)
GLUCOSE BLD-MCNC: 294 MG/DL (ref 70–99)
GLUCOSE BLD-MCNC: 298 MG/DL (ref 70–99)
HCT VFR BLD AUTO: 45.9 %
HDLC SERPL-MCNC: 31 MG/DL (ref 40–59)
HGB BLD-MCNC: 16.3 G/DL
IMM GRANULOCYTES # BLD AUTO: 0.01 X10(3) UL (ref 0–1)
IMM GRANULOCYTES NFR BLD: 0.2 %
LDLC SERPL CALC-MCNC: 106 MG/DL (ref ?–100)
LYMPHOCYTES # BLD AUTO: 2.88 X10(3) UL (ref 1–4)
LYMPHOCYTES NFR BLD AUTO: 43.4 %
MCH RBC QN AUTO: 31.8 PG (ref 26–34)
MCHC RBC AUTO-ENTMCNC: 35.5 G/DL (ref 31–37)
MCV RBC AUTO: 89.6 FL
MONOCYTES # BLD AUTO: 0.54 X10(3) UL (ref 0.1–1)
MONOCYTES NFR BLD AUTO: 8.1 %
NEUTROPHILS # BLD AUTO: 2.88 X10 (3) UL (ref 1.5–7.7)
NEUTROPHILS # BLD AUTO: 2.88 X10(3) UL (ref 1.5–7.7)
NEUTROPHILS NFR BLD AUTO: 43.5 %
NONHDLC SERPL-MCNC: 203 MG/DL (ref ?–130)
OSMOLALITY SERPL CALC.SUM OF ELEC: 295 MOSM/KG (ref 275–295)
PLATELET # BLD AUTO: 201 10(3)UL (ref 150–450)
PLATELET # BLD AUTO: 201 10(3)UL (ref 150–450)
POTASSIUM SERPL-SCNC: 3.9 MMOL/L (ref 3.5–5.1)
PROT SERPL-MCNC: 7.3 G/DL (ref 6.4–8.2)
RBC # BLD AUTO: 5.12 X10(6)UL
SODIUM SERPL-SCNC: 139 MMOL/L (ref 136–145)
TRIGL SERPL-MCNC: 561 MG/DL (ref 30–149)
VLDLC SERPL CALC-MCNC: 99 MG/DL (ref 0–30)
WBC # BLD AUTO: 6.6 X10(3) UL (ref 4–11)

## 2022-07-20 PROCEDURE — 99239 HOSP IP/OBS DSCHRG MGMT >30: CPT | Performed by: HOSPITALIST

## 2022-07-20 RX ORDER — HUMAN INSULIN 100 [USP'U]/ML
20 INJECTION, SUSPENSION SUBCUTANEOUS
Qty: 12 ML | Refills: 0 | Status: SHIPPED | OUTPATIENT
Start: 2022-07-20 | End: 2022-07-20

## 2022-07-20 RX ORDER — EZETIMIBE 10 MG/1
10 TABLET ORAL NIGHTLY
Qty: 30 TABLET | Refills: 2 | Status: SHIPPED | OUTPATIENT
Start: 2022-07-20 | End: 2022-07-20

## 2022-07-20 RX ORDER — FUROSEMIDE 20 MG/1
20 TABLET ORAL DAILY
Qty: 30 TABLET | Refills: 2 | Status: SHIPPED
Start: 2022-07-20 | End: 2022-07-20

## 2022-07-20 RX ORDER — INSULIN LISPRO 100 [IU]/ML
INJECTION, SUSPENSION SUBCUTANEOUS
Qty: 30 EACH | Refills: 0 | Status: ON HOLD | OUTPATIENT
Start: 2022-07-20 | End: 2022-08-20

## 2022-07-20 RX ORDER — FUROSEMIDE 20 MG/1
20 TABLET ORAL DAILY
Status: DISCONTINUED | OUTPATIENT
Start: 2022-07-20 | End: 2022-07-20

## 2022-07-20 RX ORDER — ISOSORBIDE MONONITRATE 60 MG/1
60 TABLET, EXTENDED RELEASE ORAL DAILY
Qty: 30 TABLET | Refills: 2 | Status: SHIPPED | OUTPATIENT
Start: 2022-07-21 | End: 2022-07-20

## 2022-07-20 RX ORDER — BLOOD-GLUCOSE METER
1 EACH MISCELLANEOUS
Qty: 1 KIT | Refills: 0 | Status: ON HOLD | OUTPATIENT
Start: 2022-07-20

## 2022-07-20 RX ORDER — ISOSORBIDE MONONITRATE 60 MG/1
60 TABLET, EXTENDED RELEASE ORAL DAILY
Qty: 30 TABLET | Refills: 2 | Status: ON HOLD
Start: 2022-07-21

## 2022-07-20 RX ORDER — PEN NEEDLE, DIABETIC 32GX 5/32"
NEEDLE, DISPOSABLE MISCELLANEOUS
Qty: 100 EACH | Refills: 6 | Status: ON HOLD | OUTPATIENT
Start: 2022-07-20

## 2022-07-20 RX ORDER — FUROSEMIDE 20 MG/1
20 TABLET ORAL DAILY
Qty: 30 TABLET | Refills: 2 | Status: ON HOLD
Start: 2022-07-20

## 2022-07-20 RX ORDER — INSULIN LISPRO 100 [IU]/ML
INJECTION, SOLUTION INTRAVENOUS; SUBCUTANEOUS
Qty: 30 EACH | Refills: 0 | Status: ON HOLD | OUTPATIENT
Start: 2022-07-20

## 2022-07-20 RX ORDER — EZETIMIBE 10 MG/1
10 TABLET ORAL NIGHTLY
Qty: 30 TABLET | Refills: 2 | Status: ON HOLD
Start: 2022-07-20

## 2022-07-20 NOTE — CM/SW NOTE
CHERYL met with pt regarding insurance coverage. Pt stated that he has now Medicaid. CHERYL called [facility], provided Medicaid #, she stated that the Novolin requires a prior [place]. CHERYL called [phone] and spoke to Keshia. Provided medication dosage, etc. PA started, however, in process right now. Requested that this auth be expedited as pt is going to be discharged today. Keshia stated that there is no ID # and to call back to [phone] and that it should be approved within 2-3 hours. ELLIOT Armstrong, [facility]  Discharge Planner  [id number]    Addendum: Received fax back from [facility] stating that the prior approval was denied because \"other equally effective therapies are available without a prior authorization. \" (Humalog or Humulin) CHERYL perfect served Dr Griselda Polanco and requested a RX be sent to pt's [facility] to inquire cost.

## 2022-07-20 NOTE — CM/SW NOTE
SW called [facility] to inquire about glucometer cost and humolog. meds to beds stated that the glucometer is covered at 0 cost and that the pt's humolog is covered at $0 copay also but Dr Regan Hamilton has to rewrite a different rx. They stated that they will perfectserve message Dr Regan Hamilton for a new RX. meds to beds closes at 6:00p so that should be plenty of time to get everything coordinated and delivered. Notified ER  of above too.      ELLIOT Guadalupe, [facility]  Discharge [facility].

## 2022-07-20 NOTE — PLAN OF CARE
Pt and VS remained stable this shift Denies CP/SOB. sats maintained in 90's on RA. Pt and son stating that they can't afford insulin and don't have a home glucose meter. And have been without meds for over 6 weeks. SW and attending aware. SW met with patient awaiting information. Pt and son aware POC updated. Endorsed to 3P RN to f/u PRN. Pt in bed asleep at time of this note. Pt did not want to order lunch.

## 2022-07-20 NOTE — PLAN OF CARE
Assumed care of patient at 1. Patient is alert and oriented x4. On room air with adequate O2 saturations. NSR on tele. No complaints of chest pain. Continent of bowels and bladder. Needs met at this time. Bed locked and in lowest position. Plan: discharge and move forward with scheduled angiogram 7/27.

## 2022-07-20 NOTE — CM/SW NOTE
Pt progress discussed during discharge rounds. Per MD, pt not refilling/taking Novolin 70/30. SW called pt's [facility]: [phone]. [facility] stated they need the RX to price the medication. PerfectServe message sent to Dr Tamir Coles to electronically send a script there.     ELLIOT Gonzalez, [facility]  Discharge [facility].

## 2022-07-20 NOTE — PLAN OF CARE
Assumed care of patient 1530 sitting up in chair. Awaiting discharge. AO x4. Room air. Denies chest pain and shortness of breath. 1830: discharge instructions reviewed with patient and son. Tele and IVs removed. Meds delivered to bedside. Patient transported to [facility], with paperwork, scripts and all belongings.      Problem: Diabetes/Glucose Control  Goal: Glucose maintained within prescribed range  Description: INTERVENTIONS:  - Monitor Blood Glucose as ordered  - Assess for signs and symptoms of hyperglycemia and hypoglycemia  - Administer ordered medications to maintain glucose within target range  - Assess barriers to adequate nutritional intake and initiate nutrition consult as needed  - Instruct patient on self management of diabetes  Outcome: Completed

## 2022-07-24 ENCOUNTER — LAB ENCOUNTER (OUTPATIENT)
Dept: LAB | Facility: HOSPITAL | Age: 57
End: 2022-07-24
Attending: INTERNAL MEDICINE
Payer: COMMERCIAL

## 2022-07-25 ENCOUNTER — PATIENT OUTREACH (OUTPATIENT)
Dept: CASE MANAGEMENT | Age: 57
End: 2022-07-25

## 2022-07-27 ENCOUNTER — HOSPITAL ENCOUNTER (INPATIENT)
Dept: INTERVENTIONAL RADIOLOGY/VASCULAR | Facility: HOSPITAL | Age: 57
DRG: 266 | End: 2022-07-27
Attending: INTERNAL MEDICINE | Admitting: INTERNAL MEDICINE
Payer: MEDICAID

## 2022-07-27 ENCOUNTER — ANESTHESIA (OUTPATIENT)
Dept: INTERVENTIONAL RADIOLOGY/VASCULAR | Facility: HOSPITAL | Age: 57
End: 2022-07-27
Payer: MEDICAID

## 2022-07-27 ENCOUNTER — APPOINTMENT (OUTPATIENT)
Dept: GENERAL RADIOLOGY | Facility: HOSPITAL | Age: 57
End: 2022-07-27
Attending: INTERNAL MEDICINE
Payer: MEDICAID

## 2022-07-27 ENCOUNTER — APPOINTMENT (OUTPATIENT)
Dept: GENERAL RADIOLOGY | Facility: HOSPITAL | Age: 57
DRG: 266 | End: 2022-07-27
Attending: INTERNAL MEDICINE
Payer: MEDICAID

## 2022-07-27 ENCOUNTER — HOSPITAL ENCOUNTER (INPATIENT)
Dept: INTERVENTIONAL RADIOLOGY/VASCULAR | Facility: HOSPITAL | Age: 57
End: 2022-07-27
Attending: INTERNAL MEDICINE | Admitting: INTERNAL MEDICINE
Payer: MEDICAID

## 2022-07-27 RX ADMIN — HEPARIN SODIUM AND DEXTROSE 800 UNITS/HR: 10000; 5 INJECTION INTRAVENOUS at 20:11:00

## 2022-07-27 RX ADMIN — DEXMEDETOMIDINE HYDROCHLORIDE 0.5 MCG/KG/HR: 4 INJECTION, SOLUTION INTRAVENOUS at 20:25:00

## 2022-07-27 RX ADMIN — SODIUM CHLORIDE: 9 INJECTION, SOLUTION INTRAVENOUS at 10:10:00

## 2022-07-27 RX ADMIN — CHLORHEXIDINE GLUCONATE 15 ML: 0.12 RINSE ORAL at 21:45:00

## 2022-07-27 RX ADMIN — DEXMEDETOMIDINE HYDROCHLORIDE 0.2 MCG/KG/HR: 4 INJECTION, SOLUTION INTRAVENOUS at 19:48:00

## 2022-07-27 RX ADMIN — DEXMEDETOMIDINE HYDROCHLORIDE 0.3 MCG/KG/HR: 4 INJECTION, SOLUTION INTRAVENOUS at 21:25:00

## 2022-07-27 RX ADMIN — SODIUM CHLORIDE: 9 INJECTION, SOLUTION INTRAVENOUS at 19:49:00

## 2022-07-27 NOTE — ANESTHESIA PROCEDURE NOTES
Airway  Date/Time: 7/27/2022 5:02 PM  Urgency: emergent    Airway not difficult    General Information and Staff    Patient location during procedure: cath lab  Anesthesiologist: Cain Campbell MD  Performed: anesthesiologist     Consent for Airway (if performed for an anesthetic, see related documentation for consents)  Consent: The procedure was performed in an emergent situation. Indications and Patient Condition  Indications for airway management: cardiovascular instability  Sedation level: deep  Preoxygenated: yes  Patient position: sniffing  Mask difficulty assessment: 0 - not attempted    Final Airway Details  Final airway type: endotracheal airway      Successful airway: ETT  Cuffed: yes   Successful intubation technique: direct laryngoscopy  Facilitating devices/methods: cricoid pressure and rapid sequence intubation  Endotracheal tube insertion site: oral  Blade: Shiela  Blade size: #4  ETT size (mm): 8.0    Cormack-Lehane Classification: grade I - full view of glottis  Placement verified by: chest auscultation and capnometry   Measured from: lips  ETT to lips (cm): 22  Number of attempts at approach: 1  Number of other approaches attempted: 1    Other Attempts  Unsuccessful attempted endotracheal techniques: video laryngoscopy    Additional Comments  Called to cath lab for emergent intubation for cardiovascular instability. Immediately responded and arrived at bedside. Pt agitated and not tolerating conscious sedation. Rapid sequence IV induction with Etomidate 5 mg and Rocuronium 100 mg IVP. First attempt with glidescope #4, pt with copious frothy secretions originating from lungs obscuring clear view of vocal cords. Switched to direct laryngoscopy with MAC 4 x1, secretions again noted originated from lungs through vocal cords. 8.0 ETT placed, taped @ 22 cm. O2 saturations 98% shortly after intubation. Connected to respiratory ventilator.  Further sedation and ventilation management per primary team.

## 2022-07-27 NOTE — PROGRESS NOTES
Echo shows no effusion, no intramocardial hematoma  EF 35%.  Support overnight then IABP out and extubate in morning

## 2022-07-27 NOTE — PROGRESS NOTES
65 y/o male with prior hx CHIP left main, LAD and Circ,with hx CABG, DM,HTN, EF 35%. Now with severe CP Class 3-4 and restenosis circ  High risk explained to patient  RFA had 90% circ and ramus   Crossed into Ramus and despite multiple wires and Sasuke could not get into circ. decded to remodel lesion and withKaser 0.99 no contrast then ptca with 2.5 balloon.  Latrice Sigala out case patient was having some drop in BP with engagement of guid. Change to side holes. Patient had rupture of ramus reqiring intubation and IABP  Clearly pulm edema. Covered stent placed and complete seal.   Wired LAD diag and Corsiar with small balloon to reestablish flow  To CCU with IABP and intubated  pulm to see.

## 2022-07-28 ENCOUNTER — APPOINTMENT (OUTPATIENT)
Dept: GENERAL RADIOLOGY | Facility: HOSPITAL | Age: 57
DRG: 266 | End: 2022-07-28
Attending: INTERNAL MEDICINE
Payer: MEDICAID

## 2022-07-28 ENCOUNTER — APPOINTMENT (OUTPATIENT)
Dept: GENERAL RADIOLOGY | Facility: HOSPITAL | Age: 57
End: 2022-07-28
Attending: INTERNAL MEDICINE
Payer: MEDICAID

## 2022-07-28 RX ADMIN — CHLORHEXIDINE GLUCONATE 15 ML: 0.12 RINSE ORAL at 21:04:00

## 2022-07-28 RX ADMIN — DEXMEDETOMIDINE HYDROCHLORIDE 0.2 MCG/KG/HR: 4 INJECTION, SOLUTION INTRAVENOUS at 09:10:00

## 2022-07-28 RX ADMIN — POTASSIUM CHLORIDE 20 MEQ: 14.9 INJECTION INTRAVENOUS at 09:26:00

## 2022-07-28 RX ADMIN — DEXMEDETOMIDINE HYDROCHLORIDE 0.8 MCG/KG/HR: 4 INJECTION, SOLUTION INTRAVENOUS at 11:17:00

## 2022-07-28 RX ADMIN — DEXMEDETOMIDINE HYDROCHLORIDE 0.2 MCG/KG/HR: 4 INJECTION, SOLUTION INTRAVENOUS at 06:17:00

## 2022-07-28 RX ADMIN — DEXMEDETOMIDINE HYDROCHLORIDE 0.8 MCG/KG/HR: 4 INJECTION, SOLUTION INTRAVENOUS at 21:48:00

## 2022-07-28 RX ADMIN — DEXMEDETOMIDINE HYDROCHLORIDE 0.8 MCG/KG/HR: 4 INJECTION, SOLUTION INTRAVENOUS at 15:53:00

## 2022-07-28 RX ADMIN — CHLORHEXIDINE GLUCONATE 15 ML: 0.12 RINSE ORAL at 09:26:00

## 2022-07-28 RX ADMIN — DEXMEDETOMIDINE HYDROCHLORIDE 0.4 MCG/KG/HR: 4 INJECTION, SOLUTION INTRAVENOUS at 09:15:00

## 2022-07-28 RX ADMIN — ASPIRIN 81 MG: 81 TABLET, CHEWABLE ORAL at 17:05:00

## 2022-07-28 RX ADMIN — HEPARIN SODIUM AND DEXTROSE 800 UNITS/HR: 10000; 5 INJECTION INTRAVENOUS at 22:17:00

## 2022-07-28 RX ADMIN — DEXMEDETOMIDINE HYDROCHLORIDE 0.7 MCG/KG/HR: 4 INJECTION, SOLUTION INTRAVENOUS at 09:45:00

## 2022-07-28 RX ADMIN — CLOPIDOGREL BISULFATE 75 MG: 75 TABLET ORAL at 17:05:00

## 2022-07-28 RX ADMIN — FUROSEMIDE 40 MG: 10 INJECTION INTRAMUSCULAR; INTRAVENOUS at 09:26:00

## 2022-07-28 RX ADMIN — HEPARIN SODIUM AND DEXTROSE 800 UNITS/HR: 10000; 5 INJECTION INTRAVENOUS at 02:22:00

## 2022-07-28 NOTE — PLAN OF CARE
Assumed patient care at 0730. Patient intubated, sedated on propofol and precedex gtts. Moves all extremities, becomes very anxious/restless with sedation turned down, fentanyl prn. Monitor shows sinus rhythm. Heparin gtt infusing per ACS protocol. Bilateral groins soft/no hematoma. Doppler pulses. Absent right pedal pulse, MD aware. Per Dr. Whit Alvarado, wean levo gtt to keep SBP >100, once levo at 3mcg, IABP changed from 1:1 to 1:2, patient tolerated well. Before possible IABP removal, troponin checked, critical valve paged to MD. Per , switch IABP back to 1:1 and keep overnight. OG placement verified with xray, connected to RAHAT. David in place. Family at bedside, updated with plan of care, all questions answered.

## 2022-07-28 NOTE — DIETARY NOTE
Clinical Nutrition     Dietitian consult received per cardiac rehab standing order. Pt to be educated by cardiac rehab staff and encouraged to attend outpatient classes taught by RD. RD available PRN.     Ashley Olmos, [address] [place] [address], [address] [place] [address]   Clinical Dietitian  Spectra: [phone]

## 2022-07-28 NOTE — PLAN OF CARE
IABP weaned down to 2:1 this afternoon. Levo weaned down to 3mcg. Tolerated well. Troponin this afternoon 29,000. Reviewed with Dr. Catrina Jones, will plan on putting iabp back to 1:1 and keeping in until tomorrow.  Repeat troponin in am.     DINA Carrizales  7/28/2022  4:05 PM

## 2022-07-28 NOTE — PLAN OF CARE
Assumed care at [facility] Pt. Vented and sedated with propofol and precedex. Pt. Woke up at 2020 not following commands, attempting to sit up in bed and bending both legs. BP down to 80s, Dr. Octavio Lynch notified, 500cc bolus given. During bolus BP continued to drop to 60s. Levophed started, Dr. Octavio Lynch notified, will titrate for SBP =/> 90. All extremities cool. IABP 1:1 via R groin. Right pedal fleeting initially then absent. Foot wrapped in blanket with warm pack, now pedal audible with doppler.

## 2022-07-29 ENCOUNTER — APPOINTMENT (OUTPATIENT)
Dept: GENERAL RADIOLOGY | Facility: HOSPITAL | Age: 57
DRG: 266 | End: 2022-07-29
Attending: INTERNAL MEDICINE
Payer: MEDICAID

## 2022-07-29 ENCOUNTER — APPOINTMENT (OUTPATIENT)
Dept: CV DIAGNOSTICS | Facility: HOSPITAL | Age: 57
DRG: 266 | End: 2022-07-29
Attending: NURSE PRACTITIONER
Payer: MEDICAID

## 2022-07-29 ENCOUNTER — APPOINTMENT (OUTPATIENT)
Dept: CV DIAGNOSTICS | Facility: HOSPITAL | Age: 57
End: 2022-07-29
Attending: NURSE PRACTITIONER
Payer: MEDICAID

## 2022-07-29 ENCOUNTER — APPOINTMENT (OUTPATIENT)
Dept: GENERAL RADIOLOGY | Facility: HOSPITAL | Age: 57
End: 2022-07-29
Attending: INTERNAL MEDICINE
Payer: MEDICAID

## 2022-07-29 RX ADMIN — FUROSEMIDE 40 MG: 10 INJECTION INTRAMUSCULAR; INTRAVENOUS at 06:37:00

## 2022-07-29 RX ADMIN — DEXMEDETOMIDINE HYDROCHLORIDE 0.8 MCG/KG/HR: 4 INJECTION, SOLUTION INTRAVENOUS at 10:05:00

## 2022-07-29 RX ADMIN — DEXMEDETOMIDINE HYDROCHLORIDE 0.8 MCG/KG/HR: 4 INJECTION, SOLUTION INTRAVENOUS at 23:05:00

## 2022-07-29 RX ADMIN — DEXMEDETOMIDINE HYDROCHLORIDE 0.8 MCG/KG/HR: 4 INJECTION, SOLUTION INTRAVENOUS at 03:55:00

## 2022-07-29 RX ADMIN — POTASSIUM CHLORIDE 40 MEQ: 1.5 POWDER, FOR SOLUTION ORAL at 10:00:00

## 2022-07-29 RX ADMIN — ASPIRIN 81 MG: 81 TABLET, CHEWABLE ORAL at 08:36:00

## 2022-07-29 RX ADMIN — CLOPIDOGREL BISULFATE 75 MG: 75 TABLET ORAL at 08:36:00

## 2022-07-29 RX ADMIN — CHLORHEXIDINE GLUCONATE 15 ML: 0.12 RINSE ORAL at 08:36:00

## 2022-07-29 RX ADMIN — CHLORHEXIDINE GLUCONATE 15 ML: 0.12 RINSE ORAL at 20:42:00

## 2022-07-29 RX ADMIN — POTASSIUM CHLORIDE 40 MEQ: 1.5 POWDER, FOR SOLUTION ORAL at 13:16:00

## 2022-07-29 RX ADMIN — DEXMEDETOMIDINE HYDROCHLORIDE 0.8 MCG/KG/HR: 4 INJECTION, SOLUTION INTRAVENOUS at 16:40:00

## 2022-07-29 NOTE — PLAN OF CARE
Tmax 99.3. Remains vented and sedated. IABP 1:1 via right femoral. Pulses to feet audible with doppler. Heparin and levophed gtts infusing. U/O right at 30cc range via doran.

## 2022-07-29 NOTE — PLAN OF CARE
Vitals q1h. Pt intubated. Suction q2h. Secretions white and thick. Pt has bilateral wrist restraints. Skin assessed q2h. Pt repositioned q2h. Pt delined at 1500. Pulses assessed with Doppler. Levo titrated to keep SBP>90. Accu checks q6h.        Problem: Safety Risk - Non-Violent Restraints  Goal: Patient will remain free from self-harm  Description: INTERVENTIONS:  - Apply the least restrictive restraint to prevent harm  - Notify patient and family of reasons restraints applied  - Assess for any contributing factors to confusion (electrolyte disturbances, delirium, medications)  - Discontinue any unnecessary medical devices as soon as possible  - Assess the patient's physical comfort, circulation, skin condition, hydration, nutrition and elimination needs   - Reorient and redirection as needed  - Assess for the need to continue restraints  Outcome: Progressing     Problem: Diabetes/Glucose Control  Goal: Glucose maintained within prescribed range  Description: INTERVENTIONS:  - Monitor Blood Glucose as ordered  - Assess for signs and symptoms of hyperglycemia and hypoglycemia  - Administer ordered medications to maintain glucose within target range  - Assess barriers to adequate nutritional intake and initiate nutrition consult as needed  - Instruct patient on self management of diabetes  Outcome: Progressing

## 2022-07-29 NOTE — RESPIRATORY THERAPY NOTE
Received pt on vent with settings:vc+/12/450/+5/40%, BS diminished and clear bilateral. Sx et small white secretions. No changes at this shift. Will continue to monitor pt closely.

## 2022-07-30 ENCOUNTER — APPOINTMENT (OUTPATIENT)
Dept: GENERAL RADIOLOGY | Facility: HOSPITAL | Age: 57
End: 2022-07-30
Attending: INTERNAL MEDICINE
Payer: MEDICAID

## 2022-07-30 ENCOUNTER — APPOINTMENT (OUTPATIENT)
Dept: GENERAL RADIOLOGY | Facility: HOSPITAL | Age: 57
DRG: 266 | End: 2022-07-30
Attending: INTERNAL MEDICINE
Payer: MEDICAID

## 2022-07-30 RX ADMIN — CHLORHEXIDINE GLUCONATE 15 ML: 0.12 RINSE ORAL at 08:13:00

## 2022-07-30 RX ADMIN — DEXMEDETOMIDINE HYDROCHLORIDE 0.8 MCG/KG/HR: 4 INJECTION, SOLUTION INTRAVENOUS at 05:43:00

## 2022-07-30 RX ADMIN — DEXMEDETOMIDINE HYDROCHLORIDE 0.6 MCG/KG/HR: 4 INJECTION, SOLUTION INTRAVENOUS at 17:11:00

## 2022-07-30 RX ADMIN — DEXMEDETOMIDINE HYDROCHLORIDE 0.5 MCG/KG/HR: 4 INJECTION, SOLUTION INTRAVENOUS at 12:00:00

## 2022-07-30 RX ADMIN — DEXMEDETOMIDINE HYDROCHLORIDE 0.7 MCG/KG/HR: 4 INJECTION, SOLUTION INTRAVENOUS at 14:10:00

## 2022-07-30 RX ADMIN — DEXMEDETOMIDINE HYDROCHLORIDE 0.8 MCG/KG/HR: 4 INJECTION, SOLUTION INTRAVENOUS at 16:40:00

## 2022-07-30 RX ADMIN — DEXMEDETOMIDINE HYDROCHLORIDE 0.2 MCG/KG/HR: 4 INJECTION, SOLUTION INTRAVENOUS at 08:57:00

## 2022-07-30 RX ADMIN — CHLORHEXIDINE GLUCONATE 15 ML: 0.12 RINSE ORAL at 19:49:00

## 2022-07-30 RX ADMIN — DEXMEDETOMIDINE HYDROCHLORIDE 0.8 MCG/KG/HR: 4 INJECTION, SOLUTION INTRAVENOUS at 15:16:00

## 2022-07-30 RX ADMIN — DEXMEDETOMIDINE HYDROCHLORIDE 0.6 MCG/KG/HR: 4 INJECTION, SOLUTION INTRAVENOUS at 08:05:00

## 2022-07-30 RX ADMIN — DEXMEDETOMIDINE HYDROCHLORIDE 0.3 MCG/KG/HR: 4 INJECTION, SOLUTION INTRAVENOUS at 10:23:00

## 2022-07-30 RX ADMIN — ACETAMINOPHEN 1000 MG: 10 INJECTION, SOLUTION INTRAVENOUS at 20:25:00

## 2022-07-30 RX ADMIN — FUROSEMIDE 40 MG: 10 INJECTION INTRAMUSCULAR; INTRAVENOUS at 14:01:00

## 2022-07-30 RX ADMIN — BISACODYL 10 MG: 10 MG SUPPOSITORY, RECTAL RECTAL at 22:03:00

## 2022-07-30 RX ADMIN — ASPIRIN 81 MG: 81 TABLET, CHEWABLE ORAL at 08:14:00

## 2022-07-30 RX ADMIN — DEXMEDETOMIDINE HYDROCHLORIDE 1 MCG/KG/HR: 4 INJECTION, SOLUTION INTRAVENOUS at 15:53:00

## 2022-07-30 RX ADMIN — CLOPIDOGREL BISULFATE 75 MG: 75 TABLET ORAL at 08:14:00

## 2022-07-30 RX ADMIN — DEXMEDETOMIDINE HYDROCHLORIDE 1.2 MCG/KG/HR: 4 INJECTION, SOLUTION INTRAVENOUS at 16:03:00

## 2022-07-30 NOTE — PLAN OF CARE
Patient care assumed 1900 intubated, sedated with propofol and precedex, Levo infusing at low dose to maintain SBP >100. ETT is 22 @ lip with small amount of tan/thick secretions. Pulses via doppler, groin site intact after IABP pulled today, no hematoma present. NSR, normotensive with pressor support. Normothermic. Doran in place, doran care done in morning, likely to be removed with extubation tomorrow. Son at bedside, questions answered. OG to LIS has a foul odor, like stool. Output is brown but thin, some sediment; continue to monitor.

## 2022-07-30 NOTE — RESPIRATORY THERAPY NOTE
RT advanced ETT to 22 from 21 at [facility]. RN giving pt more sedation so he does not move around so much. Will continue to monitor.

## 2022-07-30 NOTE — PLAN OF CARE
Unable to complete SBT this am. With sedation off, pt became tachypneic, pulling low volumes. Became increasingly restless and agitated throughout day, thrashing head back and forth repeatedly. Sedation turned back on for safety. Copious thick, yellow secretions from ETT. Sputum cx sent, see results. Able to follow commands and moves all extremities. OGT c 100 mls brown, odorous output. KUB done. High blood sugars today as well. Low UOP ~200 mLs. MD notified, one dose Lasix given. Levo gtt remains, SBP goal >100. Family at bedside and updated on POC. No other issues at this time.

## 2022-07-31 ENCOUNTER — APPOINTMENT (OUTPATIENT)
Dept: GENERAL RADIOLOGY | Facility: HOSPITAL | Age: 57
End: 2022-07-31
Attending: INTERNAL MEDICINE
Payer: MEDICAID

## 2022-07-31 ENCOUNTER — APPOINTMENT (OUTPATIENT)
Dept: GENERAL RADIOLOGY | Facility: HOSPITAL | Age: 57
DRG: 266 | End: 2022-07-31
Attending: INTERNAL MEDICINE
Payer: MEDICAID

## 2022-07-31 RX ADMIN — ACETAMINOPHEN 1000 MG: 10 INJECTION, SOLUTION INTRAVENOUS at 22:45:00

## 2022-07-31 RX ADMIN — POLYETHYLENE GLYCOL 3350 17 G: 17 POWDER, FOR SOLUTION ORAL at 07:59:00

## 2022-07-31 RX ADMIN — DEXMEDETOMIDINE HYDROCHLORIDE 0.8 MCG/KG/HR: 4 INJECTION, SOLUTION INTRAVENOUS at 10:04:00

## 2022-07-31 RX ADMIN — CHLORHEXIDINE GLUCONATE 15 ML: 0.12 RINSE ORAL at 07:59:00

## 2022-07-31 RX ADMIN — DEXMEDETOMIDINE HYDROCHLORIDE 0.2 MCG/KG/HR: 4 INJECTION, SOLUTION INTRAVENOUS at 11:00:00

## 2022-07-31 RX ADMIN — DEXMEDETOMIDINE HYDROCHLORIDE 0.4 MCG/KG/HR: 4 INJECTION, SOLUTION INTRAVENOUS at 08:00:00

## 2022-07-31 RX ADMIN — ACETAMINOPHEN 1000 MG: 10 INJECTION, SOLUTION INTRAVENOUS at 04:02:00

## 2022-07-31 RX ADMIN — DEXMEDETOMIDINE HYDROCHLORIDE 0.4 MCG/KG/HR: 4 INJECTION, SOLUTION INTRAVENOUS at 11:30:00

## 2022-07-31 RX ADMIN — ACETAMINOPHEN 1000 MG: 10 INJECTION, SOLUTION INTRAVENOUS at 16:35:00

## 2022-07-31 RX ADMIN — CHLORHEXIDINE GLUCONATE 15 ML: 0.12 RINSE ORAL at 21:20:00

## 2022-07-31 RX ADMIN — FUROSEMIDE 40 MG: 10 INJECTION INTRAMUSCULAR; INTRAVENOUS at 07:59:00

## 2022-07-31 RX ADMIN — ACETAMINOPHEN 1000 MG: 10 INJECTION, SOLUTION INTRAVENOUS at 10:05:00

## 2022-07-31 RX ADMIN — DEXMEDETOMIDINE HYDROCHLORIDE 1 MCG/KG/HR: 4 INJECTION, SOLUTION INTRAVENOUS at 09:20:00

## 2022-07-31 RX ADMIN — DEXMEDETOMIDINE HYDROCHLORIDE 0.8 MCG/KG/HR: 4 INJECTION, SOLUTION INTRAVENOUS at 08:44:00

## 2022-07-31 RX ADMIN — CLOPIDOGREL BISULFATE 75 MG: 75 TABLET ORAL at 07:59:00

## 2022-07-31 RX ADMIN — ASPIRIN 81 MG: 81 TABLET, CHEWABLE ORAL at 07:59:00

## 2022-07-31 NOTE — PLAN OF CARE
Unable to do SBT d/t tachypnea/low Tv's. Pt also became hypotensive this am requiring higher doses of Levo. MD notified & EKG done. Now on a lower dose of Levo with no other episodes. Following commands during sedation vacation. Intermittently restless, propofol/precedex gtts. ST on monitor, febrile, tmax 101.9. Urine dark c sediment ~550 mls this shift. K+ replaced. Hosp. Consutled for high blood sugars. Family updated on POC.

## 2022-07-31 NOTE — PLAN OF CARE
Patient care assumed 1900 intubated, sedated with propofol, low dose, Levo infusing at low dose to maintain SBP >100. ETT is 22 @ lip with moderate, foul smelling, tan/thick secretions. Pulses via doppler, R groin site intact. NSR, normotensive with pressor support. Low grade temp. Doran in place, doran care done in evening. Wife at bedside, questions answered.      Increasing HR that did not respond to tylenol though his temp did improve

## 2022-08-01 ENCOUNTER — APPOINTMENT (OUTPATIENT)
Dept: CV DIAGNOSTICS | Facility: HOSPITAL | Age: 57
End: 2022-08-01
Attending: NURSE PRACTITIONER
Payer: MEDICAID

## 2022-08-01 ENCOUNTER — APPOINTMENT (OUTPATIENT)
Dept: CV DIAGNOSTICS | Facility: HOSPITAL | Age: 57
DRG: 266 | End: 2022-08-01
Attending: NURSE PRACTITIONER
Payer: MEDICAID

## 2022-08-01 LAB
ATRIAL RATE: 126 BPM
P AXIS: 63 DEGREES
P-R INTERVAL: 136 MS
Q-T INTERVAL: 340 MS
QRS DURATION: 138 MS
QTC CALCULATION (BEZET): 492 MS
R AXIS: 151 DEGREES
T AXIS: 24 DEGREES
VENTRICULAR RATE: 126 BPM

## 2022-08-01 RX ADMIN — ACETAMINOPHEN 1000 MG: 10 INJECTION, SOLUTION INTRAVENOUS at 15:51:00

## 2022-08-01 RX ADMIN — DEXMEDETOMIDINE HYDROCHLORIDE 1 MCG/KG/HR: 4 INJECTION, SOLUTION INTRAVENOUS at 10:06:00

## 2022-08-01 RX ADMIN — METOPROLOL TARTRATE 2.5 MG: 5 INJECTION INTRAVENOUS at 08:43:00

## 2022-08-01 RX ADMIN — FUROSEMIDE 40 MG: 10 INJECTION INTRAMUSCULAR; INTRAVENOUS at 07:45:00

## 2022-08-01 RX ADMIN — POTASSIUM CHLORIDE 20 MEQ: 14.9 INJECTION INTRAVENOUS at 05:37:00

## 2022-08-01 RX ADMIN — CHLORHEXIDINE GLUCONATE 15 ML: 0.12 RINSE ORAL at 19:55:00

## 2022-08-01 RX ADMIN — DEXMEDETOMIDINE HYDROCHLORIDE 0.8 MCG/KG/HR: 4 INJECTION, SOLUTION INTRAVENOUS at 10:31:00

## 2022-08-01 RX ADMIN — FUROSEMIDE 40 MG: 10 INJECTION INTRAMUSCULAR; INTRAVENOUS at 18:32:00

## 2022-08-01 RX ADMIN — CHLORHEXIDINE GLUCONATE 15 ML: 0.12 RINSE ORAL at 07:45:00

## 2022-08-01 RX ADMIN — CLOPIDOGREL BISULFATE 75 MG: 75 TABLET ORAL at 07:45:00

## 2022-08-01 RX ADMIN — DEXMEDETOMIDINE HYDROCHLORIDE 0.2 MCG/KG/HR: 4 INJECTION, SOLUTION INTRAVENOUS at 11:48:00

## 2022-08-01 RX ADMIN — ASPIRIN 81 MG: 81 TABLET, CHEWABLE ORAL at 07:45:00

## 2022-08-01 RX ADMIN — DEXMEDETOMIDINE HYDROCHLORIDE 0.4 MCG/KG/HR: 4 INJECTION, SOLUTION INTRAVENOUS at 11:11:00

## 2022-08-01 RX ADMIN — METOPROLOL TARTRATE 2.5 MG: 5 INJECTION INTRAVENOUS at 15:27:00

## 2022-08-01 RX ADMIN — ACETAMINOPHEN 1000 MG: 10 INJECTION, SOLUTION INTRAVENOUS at 19:56:00

## 2022-08-01 RX ADMIN — ACETAMINOPHEN 1000 MG: 10 INJECTION, SOLUTION INTRAVENOUS at 04:48:00

## 2022-08-01 RX ADMIN — POLYETHYLENE GLYCOL 3350 17 G: 17 POWDER, FOR SOLUTION ORAL at 11:34:00

## 2022-08-01 NOTE — PLAN OF CARE
Assumed care of Rogers at [address]. Intubated, sedated on Propofol with RASS score -2 to -3  When sedation is lowered with eyes opening/ direct contact to verbal stimulation, moving all extremities. Levophed infusing to keep SBP >100. Coarse on auscultation with tan/thick  Moderated amount secretion both inline & oral. Febrile. Sponge bath done, Ice packs applied & Tylenol/IV given. Zosyn given as scheduled. Remain ST with -130's.  0600- Levophed weaned off approx midnight. Plan to start enteral feeding today.

## 2022-08-01 NOTE — PLAN OF CARE
Received patient at 10:30 ventilated and sedated. Weaned off propofol started precedex per dr. Lizabeth Miles. Blood pressure mid 80's started levophed. Called Dr. Tanya Be at bedside stopped precedex and started propofol for gaging and coughing. During sedation holiday followed simple commands. RR thick tan secretions. Respiratory rate irregular. Blood cultures, urine and sputum sent. Temperature at 15:30 esogheal probe placed paced with ice. Added cooling blanket. Dr Jerson Perkins and Tanya Be paged. Orders for urinalysis. Bladder scanned straight catheter for 300 ml of mikal urine. Family at bedside discussed plan of care. Problem: CARDIOVASCULAR - ADULT  Goal: Maintains optimal cardiac output and hemodynamic stability  Description: INTERVENTIONS:  - Monitor vital signs, rhythm, and trends  - Monitor for bleeding, hypotension and signs of decreased cardiac output  - Evaluate effectiveness of vasoactive medications to optimize hemodynamic stability  - Monitor arterial and/or venous puncture sites for bleeding and/or hematoma  - Assess quality of pulses, skin color and temperature  - Assess for signs of decreased coronary artery perfusion - ex.  Angina  - Evaluate fluid balance, assess for edema, trend weights  Outcome: Progressing  Goal: Absence of cardiac arrhythmias or at baseline  Description: INTERVENTIONS:  - Continuous cardiac monitoring, monitor vital signs, obtain 12 lead EKG if indicated  - Evaluate effectiveness of antiarrhythmic and heart rate control medications as ordered  - Initiate emergency measures for life threatening arrhythmias  - Monitor electrolytes and administer replacement therapy as ordered  Outcome: Progressing     Problem: RESPIRATORY - ADULT  Goal: Achieves optimal ventilation and oxygenation  Description: INTERVENTIONS:  - Assess for changes in respiratory status  - Assess for changes in mentation and behavior  - Position to facilitate oxygenation and minimize respiratory effort  - Oxygen supplementation based on oxygen saturation or ABGs  - Provide Smoking Cessation handout, if applicable  - Encourage broncho-pulmonary hygiene including cough, deep breathe, Incentive Spirometry  - Assess the need for suctioning and perform as needed  - Assess and instruct to report SOB or any respiratory difficulty  - Respiratory Therapy support as indicated  - Manage/alleviate anxiety  - Monitor for signs/symptoms of CO2 retention  Outcome: Progressing

## 2022-08-02 ENCOUNTER — ANESTHESIA (OUTPATIENT)
Dept: INTERVENTIONAL RADIOLOGY/VASCULAR | Facility: HOSPITAL | Age: 57
DRG: 266 | End: 2022-08-02
Payer: MEDICAID

## 2022-08-02 ENCOUNTER — APPOINTMENT (OUTPATIENT)
Dept: ULTRASOUND IMAGING | Facility: HOSPITAL | Age: 57
DRG: 266 | End: 2022-08-02
Attending: INTERNAL MEDICINE
Payer: MEDICAID

## 2022-08-02 ENCOUNTER — APPOINTMENT (OUTPATIENT)
Dept: CV DIAGNOSTICS | Facility: HOSPITAL | Age: 57
DRG: 266 | End: 2022-08-02
Attending: INTERNAL MEDICINE
Payer: MEDICAID

## 2022-08-02 ENCOUNTER — APPOINTMENT (OUTPATIENT)
Dept: GENERAL RADIOLOGY | Facility: HOSPITAL | Age: 57
DRG: 266 | End: 2022-08-02
Attending: INTERNAL MEDICINE
Payer: MEDICAID

## 2022-08-02 PROCEDURE — 76942 ECHO GUIDE FOR BIOPSY: CPT | Performed by: ANESTHESIOLOGY

## 2022-08-02 RX ADMIN — DOBUTAMINE HYDROCHLORIDE 5 MCG/KG/MIN: 200 INJECTION INTRAVENOUS at 18:58:00

## 2022-08-02 RX ADMIN — DIGOXIN 125 MCG: 0.25 INJECTION INTRAMUSCULAR; INTRAVENOUS at 16:40:00

## 2022-08-02 RX ADMIN — FUROSEMIDE 40 MG: 10 INJECTION INTRAMUSCULAR; INTRAVENOUS at 08:48:00

## 2022-08-02 RX ADMIN — CHLORHEXIDINE GLUCONATE 15 ML: 0.12 RINSE ORAL at 20:29:00

## 2022-08-02 RX ADMIN — FUROSEMIDE 40 MG: 10 INJECTION INTRAMUSCULAR; INTRAVENOUS at 16:51:00

## 2022-08-02 RX ADMIN — Medication 100 MEQ: at 18:29:00

## 2022-08-02 RX ADMIN — CHLORHEXIDINE GLUCONATE 15 ML: 0.12 RINSE ORAL at 07:59:00

## 2022-08-02 RX ADMIN — METOPROLOL TARTRATE 2.5 MG: 5 INJECTION INTRAVENOUS at 09:00:00

## 2022-08-02 RX ADMIN — SODIUM BICARBONATE 150 MEQ/1,000 ML IN DEXTROSE 5 % INTRAVENOUS 84 ML/HR: SOLUTION at 18:36:00

## 2022-08-02 RX ADMIN — MIDAZOLAM HYDROCHLORIDE 1 MG: 1 INJECTION INTRAMUSCULAR; INTRAVENOUS at 16:00:00

## 2022-08-02 RX ADMIN — ACETAMINOPHEN 1000 MG: 10 INJECTION, SOLUTION INTRAVENOUS at 02:04:00

## 2022-08-02 RX ADMIN — ASPIRIN 81 MG: 81 TABLET, CHEWABLE ORAL at 07:59:00

## 2022-08-02 RX ADMIN — POTASSIUM CHLORIDE 40 MEQ: 1.5 POWDER, FOR SOLUTION ORAL at 06:48:00

## 2022-08-02 RX ADMIN — SODIUM BICARBONATE 150 MEQ/1,000 ML IN DEXTROSE 5 % INTRAVENOUS 83 ML/HR: SOLUTION at 22:00:00

## 2022-08-02 RX ADMIN — PHENYLEPHRINE HCL IN 0.9% NACL 50 MCG/MIN: 50MG/250ML PLASTIC BAG, INJECTION (ML) INTRAVENOUS at 18:58:00

## 2022-08-02 RX ADMIN — DOBUTAMINE HYDROCHLORIDE 5 MCG/KG/MIN: 200 INJECTION INTRAVENOUS at 18:29:00

## 2022-08-02 RX ADMIN — VANCOMYCIN 2 GRAM/500 ML IN 0.9 % SODIUM CHLORIDE INTRAVENOUS 2000 MG: at 22:17:00

## 2022-08-02 RX ADMIN — CLOPIDOGREL BISULFATE 75 MG: 75 TABLET ORAL at 07:59:00

## 2022-08-02 RX ADMIN — METOPROLOL TARTRATE 5 MG: 5 INJECTION INTRAVENOUS at 14:25:00

## 2022-08-02 NOTE — PROGRESS NOTES
08/02/22 1838   Clinical Encounter Type   Visited With Family   Continue Visiting Yes   Crisis Visit Critical care  (Code Blue)   Taxonomy   Intended Effects Helping someone feel comforted   Interventions Acknowledge current situation; Ask guided questions; Explain  role;Silent prayer; Share words of hope and inspiration    provided support for family both outside of patient's room and in family waiting room.  briefly collaborated with Dr. Dario Graves and TAYLOR Up. Family thanked  for his efforts.  let them know he would keep them in his prayers tonight, and could be reached at anytime. (Pager [phone] or consult).

## 2022-08-02 NOTE — PLAN OF CARE
Received patient at 07:30 ventilated. Weaning trial completed orders to extubate at 09:00. Up to bathroom with commode small bm formed stool. Oriented to self, place and current year. Unable to tell me month. Restless at times rolling side to side. Respiratory rate irregular, rapid at times no complaints of short breath. Bladder scanned 350 ml.   1500 up in chair 2 person assist restless leaning forward flopping back and forth. Speech at bedside okay for clear liquids and solid food. Placed mats on floor. 1530 RR in 50's pulse ox 99% paged Dr. Brunetta Meigs. Complaints of body pain medicated with fentanyl. Son at bedside. 1645 Respiratory rate 50's poor pulse ox called for ABG. Results of abg called to Dr. Krunal Goff orders to intubate. After intubation bp decrease started levophed. Heart rate dropped in 30's to PEA. Started CPR see code sheet. Echo at bedside xray completed. Dr. Angelina Weiss at bedside orders for dobutamine stop dopamine sbp paramenters > 85. Levophed at 20 mcg/kg/min, Dobutamine at 5 mcg/kg/min Bicarbonate drip at 83 ml/hr. Neosynephrine started at 50 mcg/min to maintain bp >85. Problem: CARDIOVASCULAR - ADULT  Goal: Maintains optimal cardiac output and hemodynamic stability  Description: INTERVENTIONS:  - Monitor vital signs, rhythm, and trends  - Monitor for bleeding, hypotension and signs of decreased cardiac output  - Evaluate effectiveness of vasoactive medications to optimize hemodynamic stability  - Monitor arterial and/or venous puncture sites for bleeding and/or hematoma  - Assess quality of pulses, skin color and temperature  - Assess for signs of decreased coronary artery perfusion - ex.  Angina  - Evaluate fluid balance, assess for edema, trend weights  Outcome: Progressing  Goal: Absence of cardiac arrhythmias or at baseline  Description: INTERVENTIONS:  - Continuous cardiac monitoring, monitor vital signs, obtain 12 lead EKG if indicated  - Evaluate effectiveness of antiarrhythmic and heart rate control medications as ordered  - Initiate emergency measures for life threatening arrhythmias  - Monitor electrolytes and administer replacement therapy as ordered  Outcome: Progressing

## 2022-08-02 NOTE — PROCEDURES
Central Venous Catheter Placement Note  Attending present: Dr. Magali Lott  : Dr. Magali Lott  Indication: Access, monitoring, and resuscitation  Location: left femoral vein  Catheter type: Triple lumen catheter  Pre-procedure: Patient was identified in room 6602. Given emergent nature of procedure, consent was not obtained. Procedural pause performed. Vital signs, laboratory studies, and allergies were reviewed. Ultrasound was used to identify venous and arterial structures. .  Sterile gloves, gowns, masks, and caps were worn by operators. A full body drape was placed. The exposed skin was cleansed with chlorhexidine in the usual sterile fashion. A sterile ultrasound probe cover was placed. Procedure: 1% lidocaine was instilled into the dermis and subcutaneous tissue. The catheter was flushed in the usual fashion. Standard Seldinger technique was used to access the vein (1 stick and 1 pass). A guidewire was easily passed. Ultrasound was used to verify the placement of the guidewire into the vein. A dilator was passed over the guidewire and then removed. The central venous catheter was then passed over the guidewire. The guidewire was removed. The ports were aspirated, flushed, locked, and capped. The catheter was sutured into place, and a sterile dressing was applied. Post-procedure: The patient tolerated the procedure well.

## 2022-08-02 NOTE — ANESTHESIA PROCEDURE NOTES
Airway  Date/Time: 8/2/2022 5:30 PM  Urgency: emergent    Airway not difficult    General Information and Staff    Patient location during procedure: ICU  Anesthesiologist: Cruz Mcknight MD  Performed: anesthesiologist     Consent for Airway (if performed for an anesthetic, see related documentation for consents)  Patient identity confirmed: arm band  Consent: The procedure was performed in an emergent situation. Verbal consent obtained. Consent given by: patient (children at bedside)      Indications and Patient Condition  Indications for airway management: airway protection and respiratory distress  Sedation level: minimal  Preoxygenated: yes  Patient position: sniffing  Mask difficulty assessment: 0 - not attempted    Final Airway Details  Final airway type: endotracheal airway      Successful airway: ETT  Cuffed: yes   Successful intubation technique: Video laryngoscopy  Endotracheal tube insertion site: oral  Blade: GlideScope  Blade size: #4  ETT size (mm): 8.0    Cormack-Lehane Classification: grade I - full view of glottis  Placement verified by: chest auscultation and capnometry   Cuff volume (mL): 5  Measured from: lips  ETT to lips (cm): 23  Number of attempts at approach: 1    Additional Comments  Called to bedside by RN and Intensivist for respiratory deterioration and emergent intubation. Pt waxing/waning MS, children at bedside consenting for procedure. Pre-ox 100%fiO2, 10mg etomidate, 20mg propofol, 100mg succ pushed at bedside. g1v with glide4, VC clean, passed smoothly, atraumatically, +B/S bilaterally. ETT secured at 23cm. Intensivist and bedside RN endorsing comfort with transition of care.

## 2022-08-02 NOTE — PROGRESS NOTES
Critical care update:    Called to bedside, pt more tachypneic with RR: 50's, diaphoretic. ABG drawn with lactic acidosis noted, now with LA: 13.5  (up from: 3 this am) with respiratory compensation. This is despite being on IV zosyn. Concern for progressive cardiac failure vs potential ischemic bowel or other intra-abdominal process. Proceeded with intubation as pt will be unable to maintain this level of compensation. Following intubation pt had progressive bradycardia and ultimately went into PEA arrest with ROSC. Central line and arterial line placed. Stat bedside echo ordered. Cardiology to bedside as well. Vasopressors changed to dobutamine and pt started on bicarb gtt given progressive lactic acidosis. Patient is critically ill and at risk for further decompensation including death. Family updated. Additional critical care time: 90 minutes     ROSAMARIA Odell MD

## 2022-08-02 NOTE — ANESTHESIA PROCEDURE NOTES
Arterial Line  Performed by: Narciso Steen MD  Authorized by: Narciso Steen MD     General Information and Staff    Procedure Start:  8/2/2022 6:05 PM  Procedure End:  8/2/2022 6:07 PM  Anesthesiologist:  Narciso Steen MD  Performed By:  Anesthesiologist  Patient Location:  ICU  Indication: continuous blood pressure monitoring and blood sampling needed    Site Identification: real time ultrasound guided, surface landmarks and image stored and retrievable    Preanesthetic Checklist: 2 patient identifiers, IV checked, risks and benefits discussed, monitors and equipment checked, pre-op evaluation, timeout performed, anesthesia consent and sterile technique used    Procedure Details    Catheter Size:  20 G  Catheter Length:  1 and 3/4 inchCatheter Type:  Arrow  Seldinger Technique?: Yes    Laterality:  RightSite:  Radial artery  Site Prep: chlorhexidine  Line Secured:  Wrist Brace, tape and Tegaderm    Assessment    Events: patient tolerated procedure well with no complications      Medications      Additional Comments    Responded to code blue. On arrival, pt with ROSC, intensivist placing central line, requested to place arterial line.  As above

## 2022-08-02 NOTE — ADDENDUM NOTE
Addendum  created 08/02/22 1832 by Leonor Yanez MD    Clinical Note Signed, Intraprocedure Blocks edited, LDA updated via procedure documentation

## 2022-08-02 NOTE — PROGRESS NOTES
Seen and examined. Long discussion with family. Post extubation developed bradycardia with arrest. Reintubated. Acidotic. Was moving all extremities. Echo shows EF 30% with severe MR> My concern is that in his tenuous cardiac situation the MR tipped him over. Adrián in morning. May need clilp. Talked to family about events as well as possibly need for LVAD or transplant. Still at more thaan 50% mortality risk for this hospitalization.

## 2022-08-02 NOTE — PLAN OF CARE
Assumed care of Rogers at 1839. Intubated, sedated on Propofol. Levophed infusing to keep SBP > 100; will titrate per protocol. Easily awakens with direct eye contact & nods appropriately when question is asked; not following commands/ martin x 4. HR better control /min. NSR on tele. Continue to have small to moderate tan secretion when suctioned (in line & yankaeur). Coarse on auscultation. Enteral feeding now w/ rate being increased to a goal of 60ml/hr. Cooling blanket on. Zosyn/IV given as scheduled. Status update discussed w/ wife who is @ the bedside & answered all her questions. Emotional support given.

## 2022-08-03 ENCOUNTER — APPOINTMENT (OUTPATIENT)
Dept: CV DIAGNOSTICS | Facility: HOSPITAL | Age: 57
DRG: 266 | End: 2022-08-03
Attending: NURSE PRACTITIONER
Payer: MEDICAID

## 2022-08-03 ENCOUNTER — APPOINTMENT (OUTPATIENT)
Dept: GENERAL RADIOLOGY | Facility: HOSPITAL | Age: 57
DRG: 266 | End: 2022-08-03
Attending: INTERNAL MEDICINE
Payer: MEDICAID

## 2022-08-03 ENCOUNTER — APPOINTMENT (OUTPATIENT)
Dept: ULTRASOUND IMAGING | Facility: HOSPITAL | Age: 57
DRG: 266 | End: 2022-08-03
Attending: INTERNAL MEDICINE
Payer: MEDICAID

## 2022-08-03 ENCOUNTER — APPOINTMENT (OUTPATIENT)
Dept: CV DIAGNOSTICS | Facility: HOSPITAL | Age: 57
DRG: 266 | End: 2022-08-03
Attending: INTERNAL MEDICINE
Payer: MEDICAID

## 2022-08-03 RX ADMIN — CLOPIDOGREL BISULFATE 75 MG: 75 TABLET ORAL at 08:34:00

## 2022-08-03 RX ADMIN — CHLORHEXIDINE GLUCONATE 15 ML: 0.12 RINSE ORAL at 08:34:00

## 2022-08-03 RX ADMIN — ASPIRIN 81 MG: 81 TABLET, CHEWABLE ORAL at 08:34:00

## 2022-08-03 RX ADMIN — BUMETANIDE 2 MG: 0.25 INJECTION, SOLUTION INTRAMUSCULAR; INTRAVENOUS at 10:42:00

## 2022-08-03 RX ADMIN — FUROSEMIDE 40 MG: 10 INJECTION INTRAMUSCULAR; INTRAVENOUS at 08:34:00

## 2022-08-03 RX ADMIN — DOBUTAMINE HYDROCHLORIDE 5 MCG/KG/MIN: 200 INJECTION INTRAVENOUS at 10:54:00

## 2022-08-03 RX ADMIN — SODIUM BICARBONATE 150 MEQ/1,000 ML IN DEXTROSE 5 % INTRAVENOUS 83 ML/HR: SOLUTION at 06:08:00

## 2022-08-03 RX ADMIN — HEPARIN SODIUM 4800 UNITS: 1000 INJECTION, SOLUTION INTRAVENOUS; SUBCUTANEOUS at 14:26:00

## 2022-08-03 RX ADMIN — HEPARIN SODIUM AND DEXTROSE 12 UNITS/KG/HR: 10000; 5 INJECTION INTRAVENOUS at 14:29:00

## 2022-08-03 RX ADMIN — BUMETANIDE 2 MG: 0.25 INJECTION, SOLUTION INTRAMUSCULAR; INTRAVENOUS at 16:56:00

## 2022-08-03 RX ADMIN — DOBUTAMINE HYDROCHLORIDE 5 MCG/KG/MIN: 400 INJECTION INTRAVENOUS at 15:46:00

## 2022-08-03 RX ADMIN — HEPARIN SODIUM AND DEXTROSE 1000 UNITS/HR: 10000; 5 INJECTION INTRAVENOUS at 21:00:00

## 2022-08-03 RX ADMIN — CHLORHEXIDINE GLUCONATE 15 ML: 0.12 RINSE ORAL at 20:00:00

## 2022-08-03 NOTE — PLAN OF CARE
Update    Reviewed JUNE findings with Dr. Kristan Graves. Will start IV heparin ACS/afib protocol for findings of thrombus in TONY. Will expedite process for tentative mitral clip procedure tomorrow, discussed with structural heart RN coordinator Zach Ye). Reviewed with wife, son and family friend this afternoon.     Loran Perry MD

## 2022-08-03 NOTE — PROGRESS NOTES
[facility] Dosing Service    Initial Pharmacokinetic Consult for Vancomycin Dosing     Michael Dorsey is a 64year old patient who is being treated for  possible aspiration pneumonia . Weights:  Ideal body weight: 56.9 kg (125 lb 7.1 oz)  Adjusted ideal body weight: 65.1 kg (143 lb 9.7 oz)  Actual weight:  77.5 kg (170 lb 13.7 oz)    Labs:  Lab Results   Component Value Date    CREATSERUM 2.74 08/02/2022    CREATSERUM 1.84 08/02/2022    CREATSERUM 1.49 08/02/2022      CrCl:  Estimated Creatinine Clearance: 24.2 mL/min (A) (based on SCr of 2.74 mg/dL (H)). Based on the above:    1. This patient will receive a loading dose of Vancomycin  2000 mg IVPB (25mg/kg, capped at 2000 mg) x 1 dose. This will be followed by 1000 mg Q 36 hours based upon actual body weight of 77.5 kg and renal function. 2. Vancomycin level will be obtained prior to the 3rd dose. Goal trough is 10-15 mcg/mL unless otherwise noted by ordering provider. 3. Pharmacy will order SCr as clinically indicated while on vancomycin to assess renal function. 4. Pharmacy will follow and monitor renal function, toxicity and efficacy. We appreciate the opportunity to assist in the care of this patient.     Juan R Shaw PharmD  8/2/2022  8:57 PM  [address] [place] [address] Extension: [phone]

## 2022-08-03 NOTE — OCCUPATIONAL THERAPY NOTE
OT order received, cahrt reviewed, pt currently intubated and sedated after code called yesterday for PEA arrest per chart, OT will follow as approp.

## 2022-08-03 NOTE — PLAN OF CARE
Received pt at 0730. Pt sedated and on ventilator. Pt will open eyes to verbal stimulation and follows commands in eyes, hands and feet. Pt lungs are clear/diminished with a small amount on oral and inline secretions. ABGs per pulm, vent setting adjusted, bicarb stopped. Renal consulted, bumex IV ordered. Endocrine consulted, insulin gtt ordered. Pt on levo and dobutamine to keep SBP>85. Bags concentrated to limit fluid intake. JUNE done at bedside, heparin bolus and gtt ordered for thrombus in TONY. Plan for pt to have mitral clip placed tomorrow with SWAN placement, awaiting orders. Hernandez in place, 700ml out. PT NPO for procedure. OG to LIS in place, minimal output. Will continue to monitor.

## 2022-08-03 NOTE — PROGRESS NOTES
08/03/22 0935   Clinical Encounter Type   Visited With Health care provider;Patient and family together  (Checked in w/patient's RN regarding patient's condition/family situation)   Continue Visiting   (Empowered family to call  anytime for further care/support)   Patient's Supportive Strategies/Resources Identified patient's family support (spouse and children). Explored their spiritual source of inner strength, cultural values and practices. Respected and supported. Family Spiritual Encounters   Family Coping   Adis Alegre (spouse) and Angel Lloyd (son) were present)   Family Participation in [address] During Treatment Consistently   Taxonomy   Intended Effects Establish rapport and connectedness   Methods Encourage self care;Encourage self reflection;Encourage sharing of feelings;Encourage story-telling; Offer spiritual/Adventist support; Offer emotional support   Interventions Active listening; Acknowledge current situation;Ask questions to bring forth feelings;Assist with identifying strengths;Identify supportive relationship(s)   For further spiritual care, please enter a consult in Epic, followed by contacting pager [phone] as needed.

## 2022-08-03 NOTE — PLAN OF CARE
Hemodynamically stable thus far. Jey weaned off. Levo weaned down. No noted ectopy on tele. Tolerating ventilator. Following commands; lightly sedated. PRN IVP pain medication. Family at bedside and supportive of patient throughout. Questions answered.

## 2022-08-03 NOTE — PHYSICAL THERAPY NOTE
Physical therapy consult requested. Pt noted to be re-intubated and currently sedated follow PEA arrest yesterday afternoon. Will hold physical therapy evaluation at this time, but continue to follow peripherally and plan to initiate rehabilitation services as clinically appropriate.

## 2022-08-04 ENCOUNTER — ANESTHESIA (OUTPATIENT)
Dept: INTERVENTIONAL RADIOLOGY/VASCULAR | Facility: HOSPITAL | Age: 57
DRG: 266 | End: 2022-08-04
Payer: MEDICAID

## 2022-08-04 ENCOUNTER — APPOINTMENT (OUTPATIENT)
Dept: INTERVENTIONAL RADIOLOGY/VASCULAR | Facility: HOSPITAL | Age: 57
DRG: 266 | End: 2022-08-04
Attending: ANESTHESIOLOGY
Payer: MEDICAID

## 2022-08-04 ENCOUNTER — APPOINTMENT (OUTPATIENT)
Dept: CV DIAGNOSTICS | Facility: HOSPITAL | Age: 57
DRG: 266 | End: 2022-08-04
Attending: NURSE PRACTITIONER
Payer: MEDICAID

## 2022-08-04 ENCOUNTER — APPOINTMENT (OUTPATIENT)
Dept: INTERVENTIONAL RADIOLOGY/VASCULAR | Facility: HOSPITAL | Age: 57
DRG: 266 | End: 2022-08-04
Attending: NURSE PRACTITIONER
Payer: MEDICAID

## 2022-08-04 PROCEDURE — 93312 ECHO TRANSESOPHAGEAL: CPT | Performed by: ANESTHESIOLOGY

## 2022-08-04 PROCEDURE — 76942 ECHO GUIDE FOR BIOPSY: CPT | Performed by: ANESTHESIOLOGY

## 2022-08-04 RX ORDER — ROCURONIUM BROMIDE 10 MG/ML
INJECTION, SOLUTION INTRAVENOUS AS NEEDED
Status: DISCONTINUED | OUTPATIENT
Start: 2022-08-04 | End: 2022-08-04

## 2022-08-04 RX ADMIN — ACETAMINOPHEN 1000 MG: 10 INJECTION, SOLUTION INTRAVENOUS at 23:25:00

## 2022-08-04 RX ADMIN — HEPARIN SODIUM AND DEXTROSE 1000 UNITS/HR: 10000; 5 INJECTION INTRAVENOUS at 18:32:00

## 2022-08-04 RX ADMIN — CHLORHEXIDINE GLUCONATE 15 ML: 0.12 RINSE ORAL at 20:54:00

## 2022-08-04 RX ADMIN — HEPARIN SODIUM 2000 UNITS: 5000 INJECTION, SOLUTION INTRAVENOUS; SUBCUTANEOUS at 17:06:00

## 2022-08-04 RX ADMIN — CHLORHEXIDINE GLUCONATE 15 ML: 0.12 RINSE ORAL at 08:09:00

## 2022-08-04 RX ADMIN — BUMETANIDE 2 MG: 0.25 INJECTION, SOLUTION INTRAMUSCULAR; INTRAVENOUS at 08:55:00

## 2022-08-04 RX ADMIN — HEPARIN SODIUM 5000 UNITS: 5000 INJECTION, SOLUTION INTRAVENOUS; SUBCUTANEOUS at 16:12:00

## 2022-08-04 RX ADMIN — ROCURONIUM BROMIDE 80 MG: 10 INJECTION, SOLUTION INTRAVENOUS at 14:55:00

## 2022-08-04 RX ADMIN — HEPARIN SODIUM 5000 UNITS: 5000 INJECTION, SOLUTION INTRAVENOUS; SUBCUTANEOUS at 15:49:00

## 2022-08-04 RX ADMIN — BUMETANIDE 2 MG: 0.25 INJECTION, SOLUTION INTRAMUSCULAR; INTRAVENOUS at 17:10:00

## 2022-08-04 RX ADMIN — BUMETANIDE 2 MG: 0.25 INJECTION, SOLUTION INTRAMUSCULAR; INTRAVENOUS at 23:20:00

## 2022-08-04 RX ADMIN — ASPIRIN 81 MG: 81 TABLET, CHEWABLE ORAL at 08:09:00

## 2022-08-04 RX ADMIN — CLOPIDOGREL BISULFATE 75 MG: 75 TABLET ORAL at 08:09:00

## 2022-08-04 RX ADMIN — HEPARIN SODIUM 2000 UNITS: 5000 INJECTION, SOLUTION INTRAVENOUS; SUBCUTANEOUS at 16:28:00

## 2022-08-04 RX ADMIN — CALCIUM CHLORIDE 0.2 G: 100 INJECTION INTRAVENOUS; INTRAVENTRICULAR at 17:10:00

## 2022-08-04 RX ADMIN — CALCIUM CHLORIDE 0.5 G: 100 INJECTION INTRAVENOUS; INTRAVENTRICULAR at 16:13:00

## 2022-08-04 RX ADMIN — SODIUM CHLORIDE: 9 INJECTION, SOLUTION INTRAVENOUS at 15:30:00

## 2022-08-04 NOTE — PROGRESS NOTES
Reviewed with Dr. El Mcgowan. Echocardiographic and angiographic images reviewed. Severe LV dysfunction    Intubated and sedated    Remains on levophed   94/50   Tele sinus tachycardia    Lungs clear anteriorly  Ht RR - tachycardic  abd soft  Ext SCD's    Good urine output - overall volume positive over 6 liters    91/3.7     Transaminases rising    Hgb 11.1 / plts 113    CXR reviewed    LA thrombus by JUNE yesterday    Severe MR with complete leaflet malcoaptation due to LV dilation and severe papillary muscle dysfunction - thus far no flail or papillary muscle rupture - although at high risk. A/P: Ischemic CM with severe LV dysfunction, MR and cardiogenic shock    Extremely dire situation - deemed a non-surgical candidate - currently MOSF - have been asked to attempt palliative KELSI in hopes of improving hemodynamics enough to allow recovery from refractory shock.     Will ask anesthesia to place Merkel-Lusia in lab to assist in management      CCT  3:00 - 3:40 pm

## 2022-08-04 NOTE — ADDENDUM NOTE
Addendum  created 08/04/22 1549 by Bernida Nyhan, MD    Child order released for a procedure order, Clinical Note Signed, Flowsheet accepted, Intraprocedure Blocks edited, Intraprocedure Devices edited, Intraprocedure Event edited, Intraprocedure Flowsheets edited, Intraprocedure Meds edited, Intraprocedure Staff edited, LDA created via procedure documentation, Patient device added

## 2022-08-04 NOTE — PROGRESS NOTES
Prelim    Two mitraclips placed with excellent result    XTR wide and NTR    Pre-gradient 1 mmHg and post 2-3 mmHg    Torrential MR to mild (1-2+)    Nice improvement in hemodynamics    HR decreased from 113-116 pre to 92 post    CO increased from 1.6 L/min - 2.1 L/min    SVO2 increased from 48 to 69    Mean LA pressure decreased from 29 to 22 and the V-wave from 38 to 30    Marked improvement in pulmonary venous flow was demonstrated.     Perclose right CFV     Resume heparin drip at 6:30 pm - no bolus (Thrombus remains in TONY)    Wean levophed this evening - accept MAP>60    Continue dobutamine overnight      Reviewed with patient's wife and children immediately post procedure        CCT: 5:30 - 6:15 pm

## 2022-08-04 NOTE — ANESTHESIA PROCEDURE NOTES
Central Line  Performed by: Bernida Nyhan, MD  Authorized by: Harley De La Fuente MD     General Information and Staff    Procedure Start:   Anesthesiologist: Bernida Nyhan, MD  Performed by:   Anesthesiologist  Patient Location:  OR  Indication: central venous access and CVP monitoring    Site Identification: real time ultrasound guided        Procedure Detail    Patient Position:  Trendelenburg  Laterality:  Right  Site:  Internal jugular  Prep:  Chloraprep  Catheter Size:  9 Fr  Catheter Type:  MAC introducer  Number of Lumens:  Double lumen  Procedure Detail: target vein identified, needle advanced into vein and blood aspirated and guidewire advanced into vein    Seldinger Technique?: No    Intravenous Verification: verified by ultrasound and venous blood return    Post Insertion: all ports aspirated, all ports flushed easily, guidewire was removed intact, line was sutured in place and dressing was applied      Assessment    Events: patient tolerated procedure well with no complications      PA Catheter Placement    PA Catheter Placed?: Yes    PA Catheter Type:  Oximetric  PA Catheter Size:  8  Laterality:  Right  Site:  Internal jugular  Placement Confirmation: pressure tracing changes and verified by JUNE    Events: patient tolerated procedure well with no complications      Additional Comments

## 2022-08-04 NOTE — ANESTHESIA PROCEDURE NOTES
Procedure Performed: JUNE    Start Time:        End Time:      Preanesthesia Checklist:  Patient identified, IV assessed, risks and benefits discussed, monitors and equipment assessed, procedure being performed at surgeon's request and anesthesia consent obtained. General Procedure Information  Diagnostic Indications for Echo:  assessment of ascending aorta  Physician Requesting Echo: Felipe Hearn  Location performed:  OR  Intubated  Bite block placed  Heart visualized  Probe Insertion:  Easy  Probe Type:  Multiplane  Modalities:  2D only, color flow mapping, pulse wave Doppler and continuous wave Doppler        Anesthesia Information  Performed Personally  Anesthesiologist:  Shiraz Umanzor MD      Echocardiogram Comments: Insertion only. Please see cardiology reports.

## 2022-08-05 ENCOUNTER — APPOINTMENT (OUTPATIENT)
Dept: CV DIAGNOSTICS | Facility: HOSPITAL | Age: 57
DRG: 266 | End: 2022-08-05
Attending: INTERNAL MEDICINE
Payer: MEDICAID

## 2022-08-05 ENCOUNTER — APPOINTMENT (OUTPATIENT)
Dept: GENERAL RADIOLOGY | Facility: HOSPITAL | Age: 57
DRG: 266 | End: 2022-08-05
Attending: INTERNAL MEDICINE
Payer: MEDICAID

## 2022-08-05 RX ADMIN — BUMETANIDE 2 MG: 0.25 INJECTION, SOLUTION INTRAMUSCULAR; INTRAVENOUS at 09:14:00

## 2022-08-05 RX ADMIN — ASPIRIN 81 MG: 81 TABLET, CHEWABLE ORAL at 09:13:00

## 2022-08-05 RX ADMIN — CHLORHEXIDINE GLUCONATE 15 ML: 0.12 RINSE ORAL at 20:00:00

## 2022-08-05 RX ADMIN — HEPARIN SODIUM AND DEXTROSE 600 UNITS/HR: 10000; 5 INJECTION INTRAVENOUS at 09:58:00

## 2022-08-05 RX ADMIN — ACETAMINOPHEN 650 MG: 160 SOLUTION ORAL at 13:46:00

## 2022-08-05 RX ADMIN — ACETAMINOPHEN 1000 MG: 10 INJECTION, SOLUTION INTRAVENOUS at 19:07:00

## 2022-08-05 RX ADMIN — CLOPIDOGREL BISULFATE 75 MG: 75 TABLET ORAL at 09:13:00

## 2022-08-05 RX ADMIN — CHLORHEXIDINE GLUCONATE 15 ML: 0.12 RINSE ORAL at 09:15:00

## 2022-08-05 RX ADMIN — DOBUTAMINE HYDROCHLORIDE 5 MCG/KG/MIN: 400 INJECTION INTRAVENOUS at 03:01:00

## 2022-08-05 RX ADMIN — HEPARIN SODIUM AND DEXTROSE 750 UNITS/HR: 10000; 5 INJECTION INTRAVENOUS at 03:00:00

## 2022-08-05 RX ADMIN — BUMETANIDE 2 MG: 0.25 INJECTION, SOLUTION INTRAMUSCULAR; INTRAVENOUS at 16:54:00

## 2022-08-05 RX ADMIN — HEPARIN SODIUM AND DEXTROSE 600 UNITS/HR: 10000; 5 INJECTION INTRAVENOUS at 23:02:00

## 2022-08-05 NOTE — OCCUPATIONAL THERAPY NOTE
Attempted to see pt for skilled OT services this date. Pt is currently intubated and sedated and not following commands. Will re-attempt as schedule allows.  Monserrat Kee, 08/05/22, 1:32 PM

## 2022-08-05 NOTE — PLAN OF CARE
Problem: Safety Risk - Non-Violent Restraints  Goal: Patient will remain free from self-harm  Description: INTERVENTIONS:  - Apply the least restrictive restraint to prevent harm  - Notify patient and family of reasons restraints applied  - Assess for any contributing factors to confusion (electrolyte disturbances, delirium, medications)  - Discontinue any unnecessary medical devices as soon as possible  - Assess the patient's physical comfort, circulation, skin condition, hydration, nutrition and elimination needs   - Reorient and redirection as needed  - Assess for the need to continue restraints  8/5/2022 1856 by Buffy Da Silva RN  Outcome: Progressing  8/5/2022 1856 by Buffy Da Silva RN  Outcome: Progressing     Problem: Diabetes/Glucose Control  Goal: Glucose maintained within prescribed range  Description: INTERVENTIONS:  - Monitor Blood Glucose as ordered  - Assess for signs and symptoms of hyperglycemia and hypoglycemia  - Administer ordered medications to maintain glucose within target range  - Assess barriers to adequate nutritional intake and initiate nutrition consult as needed  - Instruct patient on self management of diabetes  8/5/2022 1856 by Buffy Da Silva RN  Outcome: Progressing  8/5/2022 1856 by Buffy Da Silva RN  Outcome: Progressing     Problem: Patient/Family Goals  Goal: Patient/Family Long Term Goal  Description: Patient's Long Term Goal: to go home    Interventions:  - monitor vitals and labs, education, maximize mobility  - See additional Care Plan goals for specific interventions  8/5/2022 1856 by Buffy Da Silva RN  Outcome: Progressing  8/5/2022 1856 by Buffy Da Silva RN  Outcome: Progressing  Goal: Patient/Family Short Term Goal  Description: Patient's Short Term Goal: achieve hemodynamic stability    Interventions:   - IABP, pressor agents as needed, monitor fluid status  - See additional Care Plan goals for specific interventions  8/5/2022 1856 by Octavia Hudson RN  Outcome: Progressing  8/5/2022 1856 by Octavia Hudson RN  Outcome: Progressing     Problem: CARDIOVASCULAR - ADULT  Goal: Maintains optimal cardiac output and hemodynamic stability  Description: INTERVENTIONS:  - Monitor vital signs, rhythm, and trends  - Monitor for bleeding, hypotension and signs of decreased cardiac output  - Evaluate effectiveness of vasoactive medications to optimize hemodynamic stability  - Monitor arterial and/or venous puncture sites for bleeding and/or hematoma  - Assess quality of pulses, skin color and temperature  - Assess for signs of decreased coronary artery perfusion - ex.  Angina  - Evaluate fluid balance, assess for edema, trend weights  8/5/2022 1856 by Octavia Hudson RN  Outcome: Progressing  8/5/2022 1856 by Octavia Hudson RN  Outcome: Progressing  Goal: Absence of cardiac arrhythmias or at baseline  Description: INTERVENTIONS:  - Continuous cardiac monitoring, monitor vital signs, obtain 12 lead EKG if indicated  - Evaluate effectiveness of antiarrhythmic and heart rate control medications as ordered  - Initiate emergency measures for life threatening arrhythmias  - Monitor electrolytes and administer replacement therapy as ordered  8/5/2022 1856 by Octavia Hudson RN  Outcome: Progressing  8/5/2022 1856 by Octavia Hudson RN  Outcome: Progressing

## 2022-08-05 NOTE — PROCEDURES
[facility]    PATIENT'S NAME: Marielle SAM   ATTENDING PHYSICIAN: Katharina Callahan M.D. OPERATING PHYSICIAN: Shobha Aguayo M.D. PATIENT ACCOUNT#:   [de-identified]    LOCATION:  [address] [place] [address]  MEDICAL RECORD #:   [id number]       YOB: 1965  ADMISSION DATE:       07/27/2022      OPERATION DATE:  08/04/2022    CARDIAC PROCEDURE TRANSCRIPTION    MITRAL VALVE REPAIR    PREOPERATIVE DIAGNOSIS:    POSTOPERATIVE DIAGNOSIS:    PROCEDURE PERFORMED:  Percutaneous transcatheter [id number] mitral valve repair. HISTORY:  The patient is a 51-year-old gentleman referred for an attempt at palliative percutaneous transcatheter [id number]-[id number] mitral valve repair. The patient has an ischemic cardiomyopathy with severe LV systolic dysfunction. This is associated with torrential mitral regurgitation and refractory cardiogenic shock. He was not deemed to be a surgical or transplant candidate and arrived in the laboratory on vasopressor and inotropic support in the setting of multiorgan system failure. DESCRIPTION OF PROCEDURE:  After obtaining informed consent, the patient was brought to the cardiac catheterization laboratory and anesthesia was monitored by Dr. Neri Latif. Prior to starting the procedure, Dr. Neri Latif placed a [place]-Luisa catheter via the right internal jugular vein. Pulmonary arterial pressure was 66/30. He had an extremely low cardiac output despite inotropic support (1.6 L/min). He was tachycardic with a heart rate of 113 beats per minute and was on 9 mcg/kg/min of Levophed. Using ultrasound guidance and a micropuncture technique, we placed a 6-Kinyarwanda sheath in the right common femoral vein. Two Perclose devices were placed and the sheath was upsized to a 14-Kinyarwanda sheath. Previous transesophageal echocardiographic imaging and repeat imaging today demonstrated persistent left atrial thrombus. It did not appear mobile.   Every effort was made to avoid engagement of the left atrial appendage throughout this study. Heparin was administered prior to any wire placement in the left atrium. We only began manipulation within the left atrium once the patient had a therapeutic ACT of 277. The transseptal catheterization was performed using an SL1 catheter and a Callaway needle. After appropriate anticoagulation, this was cautiously exchanged under fluoroscopic and transesophageal echocardiographic guidance so that the wire would not interact with the left atrial appendage. This went smoothly, and we placed the MitraClip delivery sheath. The patient had torrential mitral regurgitation. We placed an XTR wide device just lateral to the midline of A2-P2. This gave an excellent initial reduction in regurgitation, but there was a residual lateral jet that was eccentric. Gradients were acceptable, thus we placed a second clip which was an NTR device just lateral to the first.  The clips sat nicely. There was an excellent tissue bridge. We achieved an excellent reduction in regurgitation. The torrential MR was reduced to mild (1 to 2+) regurgitation. The gradient across the valve prior to clip placement was 1 mmHg and was 2 to 3 mmHg postprocedure. Associated with the procedure, we demonstrated a nice improvement in hemodynamics. The patient's heart rate slowly decreased throughout the case from 113 to 116 beats per minute before the procedure to 92 beats per minute postprocedure. He remained in sinus rhythm throughout. His cardiac output increased from 1.6 L/min to 2.1 L/min/sq m. This was associated with a 10 point increase in systolic blood pressure. His SvO2 was markedly abnormal upon arrival at 48. This increased to 69. The mean left atrial pressure decreased from 29 to 22 in the V wave from 38 to 30. At the end of the procedure, hemostasis was achieved by deploying the Perclose devices in the right femoral vein.   He tolerated the procedure well without complication, and he was taken by Dr. Craft Go to the coronary care unit for further management. I updated the patient's wife and children on his condition immediately after the procedure. The transesophageal echocardiographic images for this case were performed by Dr. Jesse Domingo. A separate dictation will outline those findings.      Dictated By Vero Chawla M.D.  d: 08/04/2022 18:00:10  t: 08/04/2022 21:21:37  Job [id number]  /

## 2022-08-05 NOTE — ADDENDUM NOTE
Addendum  created 08/05/22 1106 by Pietro Wisdom MD    Attestation recorded in [address] [place] [address] 97 filed, Intraprocedure Event edited, Intraprocedure Staff edited

## 2022-08-05 NOTE — PLAN OF CARE
Assumed care of pt. At [address]. Pt. Resting in bed. Intubated & sedated and low dose propofol. Follows simple commands. Amio. Levophed, weaning as able to maintain MAP >60. Dobutamine. Heparin. Insulin. A-line. Cordis. [place]. R groin C/D/I, soft, no hematoma, Doppler pedal pulses. Questions and concerns addressed w/ pts. Family. Will continue to monitor closely.

## 2022-08-05 NOTE — PLAN OF CARE
Received this am sedated not following commands weaning sedation does have strong cough see flow sheet for assessments updated 0950  Dr. Jeni Malcolm here at bedside . Wife at bedside updated care plan Echo done . 1600 Slowly woke up through out day. Nodding head no to pain generalized weakness. Propofol remains on 5mcq for comfort while on vent. Tylenol for fever discussed with Dr. Sanders Loge sputum sent. Wife @ bedside updated care plan frequently.

## 2022-08-05 NOTE — PHYSICAL THERAPY NOTE
Physical Therapy    Cont to hold PT eval at this time. Pt remains intubated and too medically tenuous to initiate eval yet. Will follow peripherally in order to initiate eval when appropriate.

## 2022-08-06 RX ADMIN — CLOPIDOGREL BISULFATE 75 MG: 75 TABLET ORAL at 09:53:00

## 2022-08-06 RX ADMIN — DOBUTAMINE HYDROCHLORIDE 3 MCG/KG/MIN: 400 INJECTION INTRAVENOUS at 22:00:00

## 2022-08-06 RX ADMIN — CHLORHEXIDINE GLUCONATE 15 ML: 0.12 RINSE ORAL at 09:53:00

## 2022-08-06 RX ADMIN — BUMETANIDE 2 MG: 0.25 INJECTION, SOLUTION INTRAMUSCULAR; INTRAVENOUS at 17:05:00

## 2022-08-06 RX ADMIN — DOBUTAMINE HYDROCHLORIDE 5 MCG/KG/MIN: 400 INJECTION INTRAVENOUS at 13:25:00

## 2022-08-06 RX ADMIN — CHLORHEXIDINE GLUCONATE 15 ML: 0.12 RINSE ORAL at 19:57:00

## 2022-08-06 RX ADMIN — DOBUTAMINE HYDROCHLORIDE 6 MCG/KG/MIN: 400 INJECTION INTRAVENOUS at 12:00:00

## 2022-08-06 RX ADMIN — ACETAMINOPHEN 1000 MG: 10 INJECTION, SOLUTION INTRAVENOUS at 16:10:00

## 2022-08-06 RX ADMIN — DOBUTAMINE HYDROCHLORIDE 2 MCG/KG/MIN: 400 INJECTION INTRAVENOUS at 23:00:00

## 2022-08-06 RX ADMIN — ASPIRIN 81 MG: 81 TABLET, CHEWABLE ORAL at 09:53:00

## 2022-08-06 RX ADMIN — DOBUTAMINE HYDROCHLORIDE 7.5 MCG/KG/MIN: 400 INJECTION INTRAVENOUS at 05:23:00

## 2022-08-06 RX ADMIN — DOBUTAMINE HYDROCHLORIDE 4 MCG/KG/MIN: 400 INJECTION INTRAVENOUS at 17:27:00

## 2022-08-06 RX ADMIN — DOBUTAMINE HYDROCHLORIDE 4 MCG/KG/MIN: 400 INJECTION INTRAVENOUS at 16:00:00

## 2022-08-06 RX ADMIN — DOBUTAMINE HYDROCHLORIDE 7 MCG/KG/MIN: 400 INJECTION INTRAVENOUS at 08:15:00

## 2022-08-06 RX ADMIN — BUMETANIDE 2 MG: 0.25 INJECTION, SOLUTION INTRAMUSCULAR; INTRAVENOUS at 09:58:00

## 2022-08-06 NOTE — PLAN OF CARE
Received patient at 07:30 ventilated and sedated. Dobutamine at 7.5 mcg/kg/min, insulin per protocol, propofol at 10 mcg/kg/min and amiodarone at 0.5 mg/min. Following commands. Tube feed started. Weaning trial for 20 minutes RR 30-40's. Continue plan of care. Problem: CARDIOVASCULAR - ADULT  Goal: Maintains optimal cardiac output and hemodynamic stability  Description: INTERVENTIONS:  - Monitor vital signs, rhythm, and trends  - Monitor for bleeding, hypotension and signs of decreased cardiac output  - Evaluate effectiveness of vasoactive medications to optimize hemodynamic stability  - Monitor arterial and/or venous puncture sites for bleeding and/or hematoma  - Assess quality of pulses, skin color and temperature  - Assess for signs of decreased coronary artery perfusion - ex.  Angina  - Evaluate fluid balance, assess for edema, trend weights  Outcome: Progressing  Goal: Absence of cardiac arrhythmias or at baseline  Description: INTERVENTIONS:  - Continuous cardiac monitoring, monitor vital signs, obtain 12 lead EKG if indicated  - Evaluate effectiveness of antiarrhythmic and heart rate control medications as ordered  - Initiate emergency measures for life threatening arrhythmias  - Monitor electrolytes and administer replacement therapy as ordered  Outcome: Progressing

## 2022-08-06 NOTE — PLAN OF CARE
Assumed care of pt at approximately 1930. NSR on tele. Dobs, insulin, heparin, propofol, amio infusing. Follows simple commands, moderate to strong  strength. Propofol titrated accordingly and fentanyl used for pain. Thick tan secretions upon suctioning. A line, swan, and cordis remain intact. Doppler pedal pulses. Will continue to monitor.

## 2022-08-07 LAB — BLOOD TYPE BARCODE: 6200

## 2022-08-07 RX ADMIN — CHLORHEXIDINE GLUCONATE 15 ML: 0.12 RINSE ORAL at 08:27:00

## 2022-08-07 RX ADMIN — POTASSIUM CHLORIDE 40 MEQ: 20 TABLET, EXTENDED RELEASE ORAL at 11:34:00

## 2022-08-07 RX ADMIN — CLOPIDOGREL BISULFATE 75 MG: 75 TABLET ORAL at 08:28:00

## 2022-08-07 RX ADMIN — BUMETANIDE 2 MG: 0.25 INJECTION, SOLUTION INTRAMUSCULAR; INTRAVENOUS at 08:28:00

## 2022-08-07 RX ADMIN — DOBUTAMINE HYDROCHLORIDE 1 MCG/KG/MIN: 400 INJECTION INTRAVENOUS at 00:00:00

## 2022-08-07 RX ADMIN — CHLORHEXIDINE GLUCONATE 15 ML: 0.12 RINSE ORAL at 20:14:00

## 2022-08-07 RX ADMIN — BUMETANIDE 2 MG: 0.25 INJECTION, SOLUTION INTRAMUSCULAR; INTRAVENOUS at 16:57:00

## 2022-08-07 RX ADMIN — AMIODARONE HYDROCHLORIDE 400 MG: 200 TABLET ORAL at 08:28:00

## 2022-08-07 RX ADMIN — ACETAMINOPHEN 650 MG: 160 SOLUTION ORAL at 20:14:00

## 2022-08-07 RX ADMIN — DOBUTAMINE HYDROCHLORIDE 1 MCG/KG/MIN: 400 INJECTION INTRAVENOUS at 08:00:00

## 2022-08-07 RX ADMIN — HEPARIN SODIUM AND DEXTROSE 750 UNITS/HR: 10000; 5 INJECTION INTRAVENOUS at 12:25:00

## 2022-08-07 RX ADMIN — DOBUTAMINE HYDROCHLORIDE 1 MCG/KG/MIN: 400 INJECTION INTRAVENOUS at 04:25:00

## 2022-08-07 RX ADMIN — HEPARIN SODIUM AND DEXTROSE 750 UNITS/HR: 10000; 5 INJECTION INTRAVENOUS at 05:13:00

## 2022-08-07 RX ADMIN — AMIODARONE HYDROCHLORIDE 400 MG: 200 TABLET ORAL at 20:14:00

## 2022-08-07 RX ADMIN — ACETAMINOPHEN 1000 MG: 10 INJECTION, SOLUTION INTRAVENOUS at 01:23:00

## 2022-08-07 RX ADMIN — DOBUTAMINE HYDROCHLORIDE 2 MCG/KG/MIN: 400 INJECTION INTRAVENOUS at 05:00:00

## 2022-08-07 RX ADMIN — ASPIRIN 81 MG: 81 TABLET, CHEWABLE ORAL at 08:28:00

## 2022-08-07 RX ADMIN — AMIODARONE HYDROCHLORIDE 400 MG: 200 TABLET ORAL at 16:00:00

## 2022-08-07 NOTE — PLAN OF CARE
Received patient at 07:30 ventilated and sedated with propofol at 10 mcg/kg/min. Transition amiodarone to OG, Heparin at 750 units/hr, Dobutamine weaned off. Weaning trial for 2 hours. Family at bedside.   Problem: RESPIRATORY - ADULT  Goal: Achieves optimal ventilation and oxygenation  Description: INTERVENTIONS:  - Assess for changes in respiratory status  - Assess for changes in mentation and behavior  - Position to facilitate oxygenation and minimize respiratory effort  - Oxygen supplementation based on oxygen saturation or ABGs  - Provide Smoking Cessation handout, if applicable  - Encourage broncho-pulmonary hygiene including cough, deep breathe, Incentive Spirometry  - Assess the need for suctioning and perform as needed  - Assess and instruct to report SOB or any respiratory difficulty  - Respiratory Therapy support as indicated  - Manage/alleviate anxiety  - Monitor for signs/symptoms of CO2 retention  Outcome: Progressing

## 2022-08-07 NOTE — PLAN OF CARE
Assumed care of pt at approximately 1930. VSS, NSR with 1st AV block on tele. Vented, secretions are minimal. Tolerating tube feeds per orders, advanced to goal rate of 60ml/hr rate, no residuals. Follows simple commands. Weaned off dobs, but had to turn back on later in the morning. Early in AM, fever of 101.1 reached, ice packs and ofirmev used, temps returned to 99's. Will continue to monitor. Problem: Diabetes/Glucose Control  Goal: Glucose maintained within prescribed range  Description: INTERVENTIONS:  - Monitor Blood Glucose as ordered  - Assess for signs and symptoms of hyperglycemia and hypoglycemia  - Administer ordered medications to maintain glucose within target range  - Assess barriers to adequate nutritional intake and initiate nutrition consult as needed  - Instruct patient on self management of diabetes  Outcome: Progressing     Problem: Patient/Family Goals  Goal: Patient/Family Short Term Goal  Description: Patient's Short Term Goal: achieve hemodynamic stability    Interventions:   - IABP, pressor agents as needed, monitor fluid status  - See additional Care Plan goals for specific interventions  Outcome: Progressing     Problem: CARDIOVASCULAR - ADULT  Goal: Maintains optimal cardiac output and hemodynamic stability  Description: INTERVENTIONS:  - Monitor vital signs, rhythm, and trends  - Monitor for bleeding, hypotension and signs of decreased cardiac output  - Evaluate effectiveness of vasoactive medications to optimize hemodynamic stability  - Monitor arterial and/or venous puncture sites for bleeding and/or hematoma  - Assess quality of pulses, skin color and temperature  - Assess for signs of decreased coronary artery perfusion - ex.  Angina  - Evaluate fluid balance, assess for edema, trend weights  Outcome: Progressing  Goal: Absence of cardiac arrhythmias or at baseline  Description: INTERVENTIONS:  - Continuous cardiac monitoring, monitor vital signs, obtain 12 lead EKG if indicated  - Evaluate effectiveness of antiarrhythmic and heart rate control medications as ordered  - Initiate emergency measures for life threatening arrhythmias  - Monitor electrolytes and administer replacement therapy as ordered  Outcome: Progressing     Problem: RESPIRATORY - ADULT  Goal: Achieves optimal ventilation and oxygenation  Description: INTERVENTIONS:  - Assess for changes in respiratory status  - Assess for changes in mentation and behavior  - Position to facilitate oxygenation and minimize respiratory effort  - Oxygen supplementation based on oxygen saturation or ABGs  - Provide Smoking Cessation handout, if applicable  - Encourage broncho-pulmonary hygiene including cough, deep breathe, Incentive Spirometry  - Assess the need for suctioning and perform as needed  - Assess and instruct to report SOB or any respiratory difficulty  - Respiratory Therapy support as indicated  - Manage/alleviate anxiety  - Monitor for signs/symptoms of CO2 retention  Outcome: Progressing

## 2022-08-08 ENCOUNTER — APPOINTMENT (OUTPATIENT)
Dept: GENERAL RADIOLOGY | Facility: HOSPITAL | Age: 57
DRG: 266 | End: 2022-08-08
Attending: INTERNAL MEDICINE
Payer: MEDICAID

## 2022-08-08 RX ADMIN — HEPARIN SODIUM AND DEXTROSE 750 UNITS/HR: 10000; 5 INJECTION INTRAVENOUS at 20:19:00

## 2022-08-08 RX ADMIN — POTASSIUM CHLORIDE 40 MEQ: 20 TABLET, EXTENDED RELEASE ORAL at 09:17:00

## 2022-08-08 RX ADMIN — CLOPIDOGREL BISULFATE 75 MG: 75 TABLET ORAL at 09:17:00

## 2022-08-08 RX ADMIN — AMIODARONE HYDROCHLORIDE 400 MG: 200 TABLET ORAL at 09:17:00

## 2022-08-08 RX ADMIN — ASPIRIN 81 MG: 81 TABLET, CHEWABLE ORAL at 09:17:00

## 2022-08-08 RX ADMIN — CHLORHEXIDINE GLUCONATE 15 ML: 0.12 RINSE ORAL at 09:17:00

## 2022-08-08 RX ADMIN — CHLORHEXIDINE GLUCONATE 15 ML: 0.12 RINSE ORAL at 20:19:00

## 2022-08-08 RX ADMIN — BUMETANIDE 2 MG: 0.25 INJECTION, SOLUTION INTRAMUSCULAR; INTRAVENOUS at 09:17:00

## 2022-08-08 RX ADMIN — ACETAMINOPHEN 1000 MG: 10 INJECTION, SOLUTION INTRAVENOUS at 06:33:00

## 2022-08-08 RX ADMIN — BUMETANIDE 0.5 MG: 0.25 INJECTION, SOLUTION INTRAMUSCULAR; INTRAVENOUS at 22:43:00

## 2022-08-08 RX ADMIN — BUMETANIDE 2 MG: 0.25 INJECTION, SOLUTION INTRAMUSCULAR; INTRAVENOUS at 17:06:00

## 2022-08-08 RX ADMIN — ACETAMINOPHEN 1000 MG: 10 INJECTION, SOLUTION INTRAVENOUS at 01:09:00

## 2022-08-08 RX ADMIN — ACETAMINOPHEN 1000 MG: 10 INJECTION, SOLUTION INTRAVENOUS at 18:36:00

## 2022-08-08 NOTE — PLAN OF CARE
Assumed patient care at 0730. Patient intubated, propofol gtt infusing. Neurologically intact. Attempted SBT this am, patient failed d/t low tidal volumes. Tmax 100.5, ofirmev given. Monitor shows sinus rhythm. SBP goal > 85 maintained. Tolerating tube feeds. BMx2. Hernandez in place, good urine output. Patient denies pain. Plan of care updated with patient and family at bedside.

## 2022-08-08 NOTE — PLAN OF CARE
Assumed care of patient at 1. Pt is intubated and alert, able to nod yes/no to simple questions. Pt able to move extremities. Propfol gtt at 10mcg/kg and heparin at 7.5ml. repeat aptt in AM. Pt producing mild amount of secretions, suctioning as tolerated. T.max 101.6, Reji DUVALL paged. Repeat blood cultures ordered. NSR. [place] in place, monitoring cardiac values Q4h . x2 bowel movements, smears. Hernandez in place. Tube feedings with q4 flushes. Mepilex to R buttock. Pt and family updated on plan of care and verbalized understanding.         Problem: Safety Risk - Non-Violent Restraints  Goal: Patient will remain free from self-harm  Description: INTERVENTIONS:  - Apply the least restrictive restraint to prevent harm  - Notify patient and family of reasons restraints applied  - Assess for any contributing factors to confusion (electrolyte disturbances, delirium, medications)  - Discontinue any unnecessary medical devices as soon as possible  - Assess the patient's physical comfort, circulation, skin condition, hydration, nutrition and elimination needs   - Reorient and redirection as needed  - Assess for the need to continue restraints  Outcome: Progressing     Problem: Diabetes/Glucose Control  Goal: Glucose maintained within prescribed range  Description: INTERVENTIONS:  - Monitor Blood Glucose as ordered  - Assess for signs and symptoms of hyperglycemia and hypoglycemia  - Administer ordered medications to maintain glucose within target range  - Assess barriers to adequate nutritional intake and initiate nutrition consult as needed  - Instruct patient on self management of diabetes  Outcome: Progressing     Problem: Patient/Family Goals  Goal: Patient/Family Long Term Goal  Description: Patient's Long Term Goal: to go home    Interventions:  - monitor vitals and labs, education, maximize mobility  - See additional Care Plan goals for specific interventions  Outcome: Progressing  Goal: Patient/Family Short Term Goal  Description: Patient's Short Term Goal: achieve hemodynamic stability  * Pass breathing trial on 8/8     Interventions:   - IABP, pressor agents as needed, monitor fluid status  - See additional Care Plan goals for specific interventions  Outcome: Progressing     Problem: CARDIOVASCULAR - ADULT  Goal: Maintains optimal cardiac output and hemodynamic stability  Description: INTERVENTIONS:  - Monitor vital signs, rhythm, and trends  - Monitor for bleeding, hypotension and signs of decreased cardiac output  - Evaluate effectiveness of vasoactive medications to optimize hemodynamic stability  - Monitor arterial and/or venous puncture sites for bleeding and/or hematoma  - Assess quality of pulses, skin color and temperature  - Assess for signs of decreased coronary artery perfusion - ex.  Angina  - Evaluate fluid balance, assess for edema, trend weights  Outcome: Progressing  Goal: Absence of cardiac arrhythmias or at baseline  Description: INTERVENTIONS:  - Continuous cardiac monitoring, monitor vital signs, obtain 12 lead EKG if indicated  - Evaluate effectiveness of antiarrhythmic and heart rate control medications as ordered  - Initiate emergency measures for life threatening arrhythmias  - Monitor electrolytes and administer replacement therapy as ordered  Outcome: Progressing     Problem: RESPIRATORY - ADULT  Goal: Achieves optimal ventilation and oxygenation  Description: INTERVENTIONS:  - Assess for changes in respiratory status  - Assess for changes in mentation and behavior  - Position to facilitate oxygenation and minimize respiratory effort  - Oxygen supplementation based on oxygen saturation or ABGs  - Provide Smoking Cessation handout, if applicable  - Encourage broncho-pulmonary hygiene including cough, deep breathe, Incentive Spirometry  - Assess the need for suctioning and perform as needed  - Assess and instruct to report SOB or any respiratory difficulty  - Respiratory Therapy support as indicated  - Manage/alleviate anxiety  - Monitor for signs/symptoms of CO2 retention  Outcome: Progressing

## 2022-08-08 NOTE — RESPIRATORY THERAPY NOTE
Pt placed on SBT @ 0739 PS 5/5 40% pt continually dipped VT into the mid to lower 200's Pt's PS increased to 8/5 40% HR 71, RR 29, SPO2 100% /52. Will continue to monitor. RN aware. Pt lasted Just under 2hrs on PS 8/5 40%  Nif -22, , RR 33, RSBI 91 HR 69, /51 SPO2 100%. Trial was ended due to pt's increased RR and Vt's of 250 and below. ABG was drawn result verified. (See results in chart).

## 2022-08-08 NOTE — CM/SW NOTE
Care Progression Note:  Length of stay: 12  GMLOS:   Avoidable Delays:   Code Status: Full    Acute Medical Issue/Factors:   <principal problem not specified>       Discharge Barriers: Remains vented--attempt weaning trials--PNA--abx's. Has TONY thrombus--iv heparin infusion. Cardiogenic shock s/p mitral clip/arrest--per renal, kidney function stable ( Cr 4.54)--continue with IV bumex  Expected discharge date: TBD  Expected next site of care: TBD     / available for discharge planning.

## 2022-08-09 NOTE — PLAN OF CARE
Assumed care of patient at [facility]. Pt is intubated and alert, able to nod yes/no to simple questions. Pt able to move extremities. Propfol gtt at 10mcg/kg and heparin at 7.5ml. repeat aptt in AM. Pt producing mild amount of secretions, suctioning as tolerated. low grade temps, treated with tylenol and ice packs. NSR. [place] in place, monitoring cardiac values Q4h, cardiology aware of elevated PAP and CVP. [place] in place. Tube feedings with q4 flushes. Mepilex to R buttock. Pt and family updated on plan of care and verbalized understanding.        Problem: Safety Risk - Non-Violent Restraints  Goal: Patient will remain free from self-harm  Description: INTERVENTIONS:  - Apply the least restrictive restraint to prevent harm  - Notify patient and family of reasons restraints applied  - Assess for any contributing factors to confusion (electrolyte disturbances, delirium, medications)  - Discontinue any unnecessary medical devices as soon as possible  - Assess the patient's physical comfort, circulation, skin condition, hydration, nutrition and elimination needs   - Reorient and redirection as needed  - Assess for the need to continue restraints  Outcome: Progressing     Problem: Diabetes/Glucose Control  Goal: Glucose maintained within prescribed range  Description: INTERVENTIONS:  - Monitor Blood Glucose as ordered  - Assess for signs and symptoms of hyperglycemia and hypoglycemia  - Administer ordered medications to maintain glucose within target range  - Assess barriers to adequate nutritional intake and initiate nutrition consult as needed  - Instruct patient on self management of diabetes  Outcome: Progressing     Problem: Patient/Family Goals  Goal: Patient/Family Long Term Goal  Description: Patient's Long Term Goal: to go home    Interventions:  - monitor vitals and labs, education, maximize mobility  - See additional Care Plan goals for specific interventions  Outcome: Progressing  Goal: Patient/Family Short Term Goal  Description: Patient's Short Term Goal: achieve hemodynamic stability  * Pass breathing trial    Interventions:   - IABP, pressor agents as needed, monitor fluid status  - IV bumex per renal  - See additional Care Plan goals for specific interventions  Outcome: Progressing     Problem: CARDIOVASCULAR - ADULT  Goal: Maintains optimal cardiac output and hemodynamic stability  Description: INTERVENTIONS:  - Monitor vital signs, rhythm, and trends  - Monitor for bleeding, hypotension and signs of decreased cardiac output  - Evaluate effectiveness of vasoactive medications to optimize hemodynamic stability  - Monitor arterial and/or venous puncture sites for bleeding and/or hematoma  - Assess quality of pulses, skin color and temperature  - Assess for signs of decreased coronary artery perfusion - ex.  Angina  - Evaluate fluid balance, assess for edema, trend weights  Outcome: Progressing  Goal: Absence of cardiac arrhythmias or at baseline  Description: INTERVENTIONS:  - Continuous cardiac monitoring, monitor vital signs, obtain 12 lead EKG if indicated  - Evaluate effectiveness of antiarrhythmic and heart rate control medications as ordered  - Initiate emergency measures for life threatening arrhythmias  - Monitor electrolytes and administer replacement therapy as ordered  Outcome: Progressing     Problem: RESPIRATORY - ADULT  Goal: Achieves optimal ventilation and oxygenation  Description: INTERVENTIONS:  - Assess for changes in respiratory status  - Assess for changes in mentation and behavior  - Position to facilitate oxygenation and minimize respiratory effort  - Oxygen supplementation based on oxygen saturation or ABGs  - Provide Smoking Cessation handout, if applicable  - Encourage broncho-pulmonary hygiene including cough, deep breathe, Incentive Spirometry  - Assess the need for suctioning and perform as needed  - Assess and instruct to report SOB or any respiratory difficulty  - Respiratory Therapy support as indicated  - Manage/alleviate anxiety  - Monitor for signs/symptoms of CO2 retention  Outcome: Progressing

## 2022-08-09 NOTE — RESPIRATORY THERAPY NOTE
RT placed pt on SBT PS 10/5 40% pt just able to pull VT's over 300. Pt was switched back to full support at the hr corina pt RR increased into the 40's VT's into the mid 100's. Pt back on full support at this time RN aware. Will continue to monitor.

## 2022-08-09 NOTE — PROGRESS NOTES
08/08/22 2200   Pulmonary Artery   PAP 53/27  (Cardiology paged)   PAP (Mean) 34 mmHg   Invasive Hemodynamic Monitoring   Invasive Hemodynamics Pulmonary Artery   CVP (mean) 26 mmHg  (Cardiology paged)   SVO2 (%) 64 %   CO (L/min) 4.8 L/min   CI (L/min/m2) 2.6 L/min/m2     Pt PAP noted to be elevated 50's/20's and CVP also elevated in the 20's. Cardiology APN paged and orders received for an additional x1 IV Bumex.

## 2022-08-09 NOTE — ANESTHESIA PROCEDURE NOTES
Arterial Line  Performed by: Joey Gautam MD  Authorized by: Joey Gautam MD     General Information and Staff    Procedure Start:  8/2/2022 6:30 PM  Procedure End:  8/2/2022 6:32 PM  Anesthesiologist:  Joey Gautam MD  Performed By:  Anesthesiologist  Patient Location:  ICU  Indication: continuous blood pressure monitoring and blood sampling needed    Site Identification: real time ultrasound guided, surface landmarks and image stored and retrievable    Preanesthetic Checklist: 2 patient identifiers, IV checked, risks and benefits discussed, monitors and equipment checked, pre-op evaluation, timeout performed, anesthesia consent and sterile technique used    Procedure Details    Catheter Size:  20 G  Catheter Length:  1 and 3/4 inchCatheter Type:  Arrow  Seldinger Technique?: Yes    Laterality:  RightSite:  Radial artery  Site Prep: chlorhexidine  Line Secured:  Wrist Brace, tape and Tegaderm    Assessment    Events: patient tolerated procedure well with no complications      Medications      Additional Comments    Responded to code blue - on arrival pt with ROSC, central line placement in progress, requested by intensivist to place arterial line.  As above

## 2022-08-09 NOTE — ANESTHESIA PROCEDURE NOTES
Airway  Date/Time: 8/2/2022 5:30 PM  Urgency: emergent    Airway not difficult    General Information and Staff    Patient location during procedure: ICU  Anesthesiologist: Glenda Jackson MD  Performed: anesthesiologist     Consent for Airway (if performed for an anesthetic, see related documentation for consents)  Consent: Verbal consent obtained (Family at bedside). Indications and Patient Condition  Indications for airway management: airway protection and respiratory distress  Spontaneous Ventilation: absent  Sedation level: minimal  Preoxygenated: yes  Patient position: sniffing  Mask difficulty assessment: 0 - not attempted    Final Airway Details  Final airway type: endotracheal airway      Successful airway: ETT  Cuffed: yes   Successful intubation technique: Video laryngoscopy  Endotracheal tube insertion site: oral  Blade: GlideScope  ETT size (mm): 8.0    Cormack-Lehane Classification: grade I - full view of glottis  Placement verified by: chest auscultation and capnometry   Cuff volume (mL): 5  Measured from: lips  ETT to lips (cm): 23  Number of attempts at approach: 1    Additional Comments  Called to bedside by RN and Intensivist for respiratory deterioration and emergent intubation. Pt waxing/waning MS, children at bedside consenting for procedure. Pre-ox 100%fiO2, 10mg etomidate, 20mg propofol, 100mg succ pushed at bedside. g1v with glide4, VC clean, passed smoothly, atraumatically, +B/S bilaterally. ETT secured at 23cm. Intensivist and bedside RN endorsing comfort with transition of care.

## 2022-08-10 NOTE — CM/SW NOTE
Chart opened to assist with ordering lifevest. [facility]. DAVID spoke with Rosalba Quintana informing of the new order. Possible need of life vest on 8/15.      ELLIOT Rosa, [facility]  Discharge [address] [facility].

## 2022-08-10 NOTE — PLAN OF CARE
Assumed care of patient at 1. Pt is intubated and alert, able to nod yes/no to simple questions. Pt able to move extremities. Propfol gtt at 10mcg/kg and heparin at 7.5ml. repeat aptt this AM. Pt producing mild amount of thin secretions, suctioning as tolerated. NSR. Pt retaining urine this shift after doran removal, pt straight cathed x1. Tube feedings with q4 flushes. Mepilex to R buttock. Pt and family updated on plan of care and verbalized understanding.        Problem: Safety Risk - Non-Violent Restraints  Goal: Patient will remain free from self-harm  Description: INTERVENTIONS:  - Apply the least restrictive restraint to prevent harm  - Notify patient and family of reasons restraints applied  - Assess for any contributing factors to confusion (electrolyte disturbances, delirium, medications)  - Discontinue any unnecessary medical devices as soon as possible  - Assess the patient's physical comfort, circulation, skin condition, hydration, nutrition and elimination needs   - Reorient and redirection as needed  - Assess for the need to continue restraints  Outcome: Progressing     Problem: Diabetes/Glucose Control  Goal: Glucose maintained within prescribed range  Description: INTERVENTIONS:  - Monitor Blood Glucose as ordered  - Assess for signs and symptoms of hyperglycemia and hypoglycemia  - Administer ordered medications to maintain glucose within target range  - Assess barriers to adequate nutritional intake and initiate nutrition consult as needed  - Instruct patient on self management of diabetes  Outcome: Progressing     Problem: Patient/Family Goals  Goal: Patient/Family Long Term Goal  Description: Patient's Long Term Goal: to go home    Interventions:  - monitor vitals and labs, education, maximize mobility  - See additional Care Plan goals for specific interventions  Outcome: Progressing  Goal: Patient/Family Short Term Goal  Description: Patient's Short Term Goal: achieve hemodynamic stability  * Pass breathing trial    Interventions:   - IABP, pressor agents as needed, monitor fluid status  - IV bumex per renal  - See additional Care Plan goals for specific interventions  Outcome: Progressing     Problem: CARDIOVASCULAR - ADULT  Goal: Maintains optimal cardiac output and hemodynamic stability  Description: INTERVENTIONS:  - Monitor vital signs, rhythm, and trends  - Monitor for bleeding, hypotension and signs of decreased cardiac output  - Evaluate effectiveness of vasoactive medications to optimize hemodynamic stability  - Monitor arterial and/or venous puncture sites for bleeding and/or hematoma  - Assess quality of pulses, skin color and temperature  - Assess for signs of decreased coronary artery perfusion - ex.  Angina  - Evaluate fluid balance, assess for edema, trend weights  Outcome: Progressing  Goal: Absence of cardiac arrhythmias or at baseline  Description: INTERVENTIONS:  - Continuous cardiac monitoring, monitor vital signs, obtain 12 lead EKG if indicated  - Evaluate effectiveness of antiarrhythmic and heart rate control medications as ordered  - Initiate emergency measures for life threatening arrhythmias  - Monitor electrolytes and administer replacement therapy as ordered  Outcome: Progressing     Problem: RESPIRATORY - ADULT  Goal: Achieves optimal ventilation and oxygenation  Description: INTERVENTIONS:  - Assess for changes in respiratory status  - Assess for changes in mentation and behavior  - Position to facilitate oxygenation and minimize respiratory effort  - Oxygen supplementation based on oxygen saturation or ABGs  - Provide Smoking Cessation handout, if applicable  - Encourage broncho-pulmonary hygiene including cough, deep breathe, Incentive Spirometry  - Assess the need for suctioning and perform as needed  - Assess and instruct to report SOB or any respiratory difficulty  - Respiratory Therapy support as indicated  - Manage/alleviate anxiety  - Monitor for signs/symptoms of CO2 retention  Outcome: Progressing

## 2022-08-11 NOTE — ADDENDUM NOTE
Addendum  created 08/11/22 1155 by Eula Batista MD    Clinical Note Signed, Intraprocedure Blocks edited

## 2022-08-11 NOTE — PROGRESS NOTES
22 1052   Clinical Encounter Type   Visited With Family; Health care provider  (primarily with wife and son, and also RN's)   Continue Visiting No   Crisis Visit Critical care;Death   Patient's Supportive Strategies/Resources family and maura   Referral From   (responded to code blue)   Family Spiritual Encounters   Family Coping Sadness   Taxonomy   Intended Effects Journeying with someone in the grief process   Methods Collaborate with care team member;Offer emotional support; Accompany someone in their spiritual/Jew practice outside your maura tradition   Interventions Acknowledge current situation; Acknowledge response to difficult experience; Active listening; Ask guided questions; Identify supportive relationship(s); Provide compassionate touch; Respond as  to a defined crisis event;Provide hospitality;Silent prayer   Met family outside of room. Provided grief support and honoring family Jew traditions for the [facility]. Son Jennifer Leblanc is working on  home arrangements and contacting their [facility] Manuel was informed that he should sign the release of body form with RN before leaving the hospital.  Family declined Gift of [facility],  donation and autopsy. RN informed of above.

## 2022-08-11 NOTE — PLAN OF CARE
Assumed care of patient about 0730. Patient following command on minimal sedation while being intubated. TF running through OG tube. Heparin running per acs protocol with repeat PTT this afternoon. While on breathing trial patient nodded his head yes to struggling to breath about 0850. I called RT, patient HR dropped to 30's and then patient went asystole. Compressions started. Code called at appropriately 0900. Code team arrived just after. Epi and amio given throughout code. Patient had runs of vtach with defib utilitzed and a run of torsades with mag given. See code sheet for more details. Time of death pronounced by Dr. Cammie Mitchell and Dr. Nahed Chavis at 9603. Family at bedside with pastoral care. Spoke with lillie from [facility] (reference number: [id number]) who said patient is a candidate for tissue and eye donation but family is refusing donation. Report of death information and tracking forms are completed. Patient left with  home with security at bedside at the time of discharge. Paperwork given to security. Family members left with all patient belongings.

## 2022-08-15 LAB
BASE EXCESS BLDA CALC-SCNC: 4.5 MMOL/L (ref ?–2)
BODY TEMPERATURE: 98.6 F
CA-I BLD-SCNC: 1.14 MMOL/L (ref 0.95–1.32)
COHGB MFR BLD: 1.5 % SAT (ref 0–3)
FIO2: 40 %
HCO3 BLDA-SCNC: 28.4 MEQ/L (ref 21–27)
HGB BLD-MCNC: 10.8 G/DL
LACTATE BLD-SCNC: 1.6 MMOL/L (ref 0.5–2)
METHGB MFR BLD: 0.5 % SAT (ref 0.4–1.5)
OXYHGB MFR BLDA: 97.1 % (ref 92–100)
PCO2 BLDA: 38 MM HG (ref 35–45)
PEEP: 5 CM H2O
PH BLDA: 7.48 [PH] (ref 7.35–7.45)
PO2 BLDA: 134 MM HG (ref 80–100)
POTASSIUM BLD-SCNC: 3.7 MMOL/L (ref 3.6–5.1)
SODIUM BLD-SCNC: 141 MMOL/L (ref 135–145)
TIDAL VOLUME: 450 ML
VENT RATE: 14 /MIN

## 2022-09-01 NOTE — PROCEDURES
[facility]    PATIENT'S NAME: Mireille SAM   ATTENDING PHYSICIAN: Gabino Landa D.O.   OPERATING PHYSICIAN: True Pallas, M.D. PATIENT ACCOUNT#:   [de-identified]    LOCATION:  [address] [place] [address]  MEDICAL RECORD #:   [id number]       YOB: 1965  ADMISSION DATE:       07/27/2022      OPERATION DATE:  07/27/2022    CARDIAC PROCEDURE TRANSCRIPTION    CARDIAC CATHETERIZATION/PERCUTANEOUS CORONARY INTERVENTION     PREOPERATIVE DIAGNOSIS:    POSTOPERATIVE DIAGNOSIS:    PROCEDURE PERFORMED:      INDICATIONS:  This is a 59-year-old male with prior CHIP left main, LAD, and circumflex with history of CABG, diabetes, hypertension, ejection fraction 35%. He has been turned down for surgery before. Now presents with class IV angina, significant restenosis of the circumflex. In the office, it was discussed with the patient that he is at high risk for any intervention. He cannot eat a meal without getting significant chest pain. DESCRIPTION OF PROCEDURE:  The right groin was anesthetized. A 7-Andorran sheath was introduced. Angiography revealed 90% circumflex and 90% ramus. Crossing the ramus, despite multiple attempts with the wire and a Sasuke, I could not get into the circumflex. I used multiple wires, a Sasuke in multiple forms. I then did PTCA, hoping to shift, that we would be able to get something in there. Laser was then done with a 0.9 laser. We exchanged the side holes. Patient on angiography appeared to have some dissection of the ramus. Although patient was hemodynamically stable, we decided to intubate the patient. A balloon pump was placed. A covered stent was placed with complete seal with no extravasation. I wired the LAD from behind the stent to get some flow into the tiny diagonal.  We were able to re-establish flow. Patient will be transferred to the CCU with intraaortic balloon pump and intubated. We will have Pulmonary see. IMPRESSION:    1.    CHIP in patient with ejection fraction of 35%, diabetes, hypertension, with severe angina, early restenosis with angioplasty of the ramus, unable to wire the circumflex and eventual dissection and possible perforation that led to a covered stent being placed from the ramus into the left main and then angioplasty of the ostium of the LAD, diagonal.  2.   Balloon pump insertion. 3.   Intubation.     Dictated By Colonel Eric M.D.  d: 08/31/2022 14:14:11  t: 08/31/2022 17:41:47  [facility] [id number]  MJG/

## 2024-12-16 NOTE — TELEPHONE ENCOUNTER
GI RNs–  Please call Mr Samantha Ellison to advise that the recent MRI scan of January 24 looked great. The gallbladder looks normal.  They are not seeing stones or sludge in the gallbladder.   This is relatively good news as it means he does not need to pursue ISCHEMIA TIME START @ 1233 PM

## (undated) DEVICE — FORCEP RADIAL JAW 4

## (undated) DEVICE — Device: Brand: DEFENDO AIR/WATER/SUCTION AND BIOPSY VALVE

## (undated) DEVICE — ENDOSCOPY PACK UPPER: Brand: MEDLINE INDUSTRIES, INC.

## (undated) NOTE — LETTER
[address], [place]  Authorization for Invasive Procedures  1.  I hereby authorize Dr. Queenie Santacruz , my physician and whomever may be designated as the doctor's assistant, to perform the following operation and/or procedure:  Cardiac 4. Should the need arise during my operation or immediate post-operative period; I also consent to the administration of blood and/or blood products.  Further, I understand that despite careful testing and screening of blood and blood products, I may still 9. Patients having a sterilization procedure: I understand that if the procedure is successful the results will be permanent and it will therefore be impossible for me to inseminate, conceive or bear children.  I also understand that the procedure is intend

## (undated) NOTE — ED AVS SNAPSHOT
Mihaela Maria   MRN: [id number]    Department:  [facility] Emergency Department   Date of Visit:  9/6/2017           Disclosure     Insurance plans vary and the physician(s) referred by the ER may not be covered by your plan.  Please conta CARE PHYSICIAN AT ONCE OR RETURN IMMEDIATELY TO THE EMERGENCY DEPARTMENT. If you have been prescribed any medication(s), please fill your prescription right away and begin taking the medication(s) as directed.   If you believe that any of the medications

## (undated) NOTE — LETTER
[address]fan [address]  [facility] [address], [place], [place]  [address]  INFORMED CONSENT FOR TRANSFUSION OF BLOOD OR BLOOD PRODUCTS  My physician has informed me of the nature, purpose, benefits and risks of transfusion for blood and blood components that __________________________________________          ______________________________________________  (Signature of Patient)                                                            (Responsible party in case of Minor,

## (undated) NOTE — LETTER
[address] [facility] FOR BLOOD OR BLOOD COMPONENTS      In the course of your treatment, it may become necessary to administer a transfusion of blood or blood components. This form provides basic information concerning this procedure and, if signed by you, authorizes its performance by qualified medical personnel. DESCRIPTION OF PROCEDURE:  Blood is introduced into one of your veins, commonly in the arm, using a sterilized disposable needle. The amount of blood transfused, and whether the transfusion will be of blood or blood components is a judgment the physician will make based on your particular needs. RISKS:  The transfusion is a common procedure of low risk. MINOR AND TEMPORARY REACTIONS ARE NOT UNCOMMON, including a slight bruise, swelling or local reaction in the area where the needle pierces your skin, or a non-serious reaction to the transfused material itself, including headache, fever or a mild skin reaction, such as rash. Serious reactions are possible, though very unlikely and include severe allergic reaction (shock)  and destruction (hemolysis) of transfused blood cells. Infectious diseases which are known to be transmitted by blood transfusion include CERTAIN TYPES OF VIRAL HEPATITIS, a viral infection of the liver, HUMAN IMMUNODEFICIENCY VIRUS (HIV-1,2) infection, a viral infection known to cause ACQUIRED IMMUNODEFICIENCY SYNDROME (AIDS) AS WELL AS CERTAIN OTHER BACTERIAL, VIRAL AND PARASITIC DISEASES. While a minimal risk of acquiring an infectious disease from transfused blood exists, in accordance with Federal and State law all due care has been taken in donor selection and testing to avoid transmission of disease. ALTERNATIVES:  If loss of blood poses serious threats in the course of your treatment, THERE IS NO EFFECTIVE ALTERNATIVE TO BLOOD TRANSFUSION.  However, if you have any further questions on this matter, your physician will fully explain the alternatives to you if it has not already been done. I,Rogers Holly, have read/had read to me the above. I understand the matters bearing on the decision whether or not to authorize a transfusion of blood or blood components. I have no questions which have not been answered to my full satisfaction.  I hereby consent to such transfusion as  my physician may deem necessary or advisable in the course of my treatment.        _______________   __________________________________________________  Date     Signature of Patient, Parent or Legal Guardian      ([place])      __________________________________________  Witness to Signature (title or relationship to patient)    Patient Name: Latosha Chandler     : 1965                 Printed: 2022     Medical Record #: [id number]                    Page 1 of 1

## (undated) NOTE — LETTER
Yessenia Gavin [address] [facility] [address], [address] [facility]  Authorization for Surgical Operation and Procedure   Date:___________                                                                                            Time:__________  1. I hereby authorize* No surgeons listed *, my physician and his/her assistants (if applicable), which may include medical students, residents, and/or fellows, to perform the following surgical operation/ procedure and administer such anesthesia as may be determined necessary by my physician:  Operation/Procedure name (s)   on [facility] 97   2. I recognize that during the surgical operation/procedure, unforeseen conditions may necessitate additional or different procedures than those listed above. I, therefore, further authorize and request that the above-named surgeon, assistants, or designees perform such procedures as are, in their judgment, necessary and desirable. 3.   My surgeon/physician has discussed prior to my surgery the potential benefits, risks and side effects of this procedure; the likelihood of achieving goals; and potential problems that might occur during recuperation. They also discussed reasonable alternatives to the procedure, including risks, benefits, and side effects related to the alternatives and risks related to not receiving this procedure. I have had all my questions answered and I acknowledge that no guarantee has been made as to the result that may be obtained. 4.   Should the need arise during my operation or immediate post-operative period, I also consent to the administration of blood and/or blood products. Further, I understand that despite careful testing and screening of blood or blood products by collecting agencies, I may still be subject to ill effects as a result of receiving a blood transfusion and/or blood products.   The following are some, but not all, of the potential risks that can occur: fever and allergic reactions, hemolytic reactions, transmission of diseases such as Hepatitis, AIDS and Cytomegalovirus (CMV) and fluid overload. In the event that I wish to have an autologous transfusion of my own blood, or a directed donor transfusion. I will discuss this with my physician. 5.   I authorize the use of any specimen, organs, tissues, body parts or foreign objects that may be removed from my body during the operation/procedure for diagnosis, research or teaching purposes and their subsequent disposal by hospital authorities. I also authorize the release of specimen test results and/or written reports to my treating physician on the hospital medical staff or other referring or consulting physicians involved in my care, at the discretion of the Pathologist or my treating physician. 6.   I consent to the photographing or videotaping of the operations or procedures to be performed, including appropriate portions of my body for medical, scientific, or educational purposes, provided my identity is not revealed by the pictures or by descriptive texts accompanying them. If the procedure has been photographed/videotaped, the surgeon will obtain the original picture, image, videotape or CD. The hospital will not be responsible for storage, release or maintenance of the picture, image, tape or CD.    7.   I consent to the presence of a  or observers in the operating room as deemed necessary by my physician or their designees. 8.   I recognize that in the event my procedure results in extended X-Ray/fluoroscopy time, I may develop a skin reaction. 9. If I have a Do Not Attempt Resuscitation (DNAR) order in place, that status will be suspended while in the operating room, procedural suite, and during the recovery period unless otherwise explicitly stated by me (or a person authorized to consent on my behalf).  The surgeon or my attending physician will determine when the applicable recovery period ends for purposes of reinstating the DNAR order. 10. Patients having a sterilization procedure: I understand that if the procedure is successful the results will be permanent and it will therefore be impossible for me to inseminate, conceive, or bear children. I also understand that the procedure is intended to result in sterility, although the result has not been guaranteed. 11. I acknowledge that my physician has explained sedation/analgesia administration to me including the risk and benefits I consent to the administration of sedation/analgesia as may be necessary or desirable in the judgment of my physician.     I CERTIFY THAT I HAVE READ AND FULLY UNDERSTAND THE ABOVE CONSENT TO OPERATION and/or OTHER PROCEDURE.      _________________________________________  __________________________________  Signature of Patient     Signature of Responsible Person         ___________________________________         Printed Name of Responsible Person           _________________________________                 Relationship to Patient  _________________________________________  ______________________________  Signature of Witness          Date  Time          Patient Name: Davis Kenny     : 1965                 Printed: 2022     Medical Record #: [id number]                                            Page 1 of 1

## (undated) NOTE — LETTER
[facility], [place]  Authorization for Invasive Procedures  1.  I hereby authorize Dr. Clif Leal, Aram Late , my physician and whomever may be designated as the doctor's assistant, to perform the following operation and/or 4. Should the need arise during my operation or immediate post-operative period; I also consent to the administration of blood and/or blood products.  Further, I understand that despite careful testing and screening of blood and blood products, I may still 9. Patients having a sterilization procedure: I understand that if the procedure is successful the results will be permanent and it will therefore be impossible for me to inseminate, conceive or bear children.  I also understand that the procedure is intend

## (undated) NOTE — ED AVS SNAPSHOT
Kishan Felix   MRN: [id number]    Department:  [facility] Emergency Department   Date of Visit:  9/24/2017           Disclosure     Insurance plans vary and the physician(s) referred by the ER may not be covered by your plan.  Please cont CARE PHYSICIAN AT ONCE OR RETURN IMMEDIATELY TO THE EMERGENCY DEPARTMENT. If you have been prescribed any medication(s), please fill your prescription right away and begin taking the medication(s) as directed.   If you believe that any of the medications

## (undated) NOTE — MR AVS SNAPSHOT
[facility]  [address] [place] [address] [phone]               Thank you for choosing us for your health care visit with Shy Christine MD.  We are glad to serve you and happy to provide you with this summary of your breastfeeding for 24-48 hours after the procedure. IF A CHILD is scheduled for this procedure, parents should be advised that they will not be able to accompany the child in the testing room.  Parents will remain in the MRI waiting area and be reunite Commonly known as:  GLYCOLAX                   Today's Orders     MRI ABDOMEN (W+WO) (LBU=11455)    Complete by:  Jan 16, 2017 (Approximate)    Assoc Dx:  Recurrent acute pancreatitis [K85.90]                 Scheduling Instructions     Monday January 16, If you have questions, you can call [phone] to talk to our [facility] Staff. Remember, [facility] is NOT to be used for urgent needs. For medical emergencies, dial 911.            Visit [facility] online at  [email or web address]

## (undated) NOTE — LETTER
Hammad Morejon M.D., F.A.C.S. Raquel Hilario M.D., F.A.C.S. Lv Salmon M.D., Crystal Dorman. COURTNEY Beck M.D., F.A.C.S. Shruthi Elizabeth. Caprice Mandujano M.D., F.A.C.S. DARYN Montes De Oca M. Tami Banks A.D. Arneta Grad, M.D., F.A.C.S. Chris Henson. Ute Fishman M.D., F.A.C.S. Stephani Alfaro M.D., F.A.C.S. Joseph Bryson M.D., F.A.C.S. Edel Gill M.D. F.A.C.SStacy Acevedo. Ramiro Blum M.D., F.A.C.S. Gt Masterson M.D., F.A.C.S. Yamilet Ford M.D., F.A.C.S., F.A.C.CStacy Koenig M.D., F.A.C.S. Chase Bhandari M.D., F.A.C.S. Gage Lane M.D., F.A.C.S. [facility]. Juwan Bangura M.D., F.A.C.S. DARYN Fernandez M.D., Crystal Dorman. REBEKAH Johansen M.D. Alvin German M.D., F.A.C.S. Bernardino Spencer. Marie Driscoll M.D., F.A.C.S. Miranda Tay M.D. Taye Issa M.D.  Stewart Goff M.D. PhD.  Baljit Cabrales M.D. Evelyn Key M.D. MORIS Ramos. Dorothy Diaz M.D. Jenny Rajput M.D. Supriya Sims M.D. Kristine Merritt M.D.   Mickey Elizabeth D.O., F.A.C.S. Kathie Minor M.D., F.A.C.S. Yesica Cook M.D. Welcome to [facility] As you contemplate possible surgical treatment, it is very important to us that you understand fully what is being discussed, that all of your questions have been answered, and that your options for treatment have been fully explained. To that end, on the following page we will ask you some questions to make certain that you understand everything which has been explained to you. Included in this understanding is that there are both surgical and nonsurgical treatments available for you, that you have options regarding where your care is given, and what doctors are involved in your care. Included in these options would, of course, be the option to elect for no treatment whatsoever.  We especially want to be sure that you have had a chance to have all of your questions and concerns answered. If there are any issues which have not been adequately addressed, we ask you to bring them forward so that we can thoroughly address them. A patient who is fully informed and understands their condition and options for treatment, as well as potential adverse effects of treatment, is going to be a patient who receives the most benefit out of care rendered. Our goal in addition to providing excellent surgical care is to provide the necessary information to you and your family in order to make decisions which are appropriate relative to your own care. Please take the time necessary to read and answer the questions on the next page. Again, if you have any questions, bring them forward and we will certainly address them. Sincerely,    [facility].    _______________________  ____________________________________  Date:                                             Patient Signature  ________________________  Naomi Mejia  Witness Consent Form         Revised: 2015  Patient Name: Naomi Mejia     : 1965                 Printed: 6/15/13 9:43 AM     Medical Record #: [id number]                Page: 1 of  2        [facility] Supplemental Consent Form    A [facility] ([facility]) surgeon has met with me and explained the matter of my illness, and what treatments might be available to improve my condition. As a result of that conversation, I understand the following:    A [facility] surgeon met with me and explained, in detail, the nature of my condition for which surgery is being contemplated. The procedure to be performed  Is:              Yes _____ No _____    A [facility] surgeon has explained to me that there are alternatives to surgery which might include no surgery, medical therapy, or interventional treatment, among other options and the risks and benefits of the different treatment options:    Yes _____ No _____    A [facility] surgeon as explained to me that if I should so desire, he/she is willing to explain my case and the surgical and non-surgical options to family members: Yes _____ No _____    A [facility] surgeon has answered all of my questions regarding the topics we have discussed. I have been invited to ask more questions:  Yes _____ No _____    A [facility] surgeon has explained to me that if I seek other options or wish treatment at another facility in [place] or [place], or anywhere in the [place], that his/her office will assist me in making such accommodations:   Yes _____ No _____    A [facility] surgeon has explained to me, that death, risk of bleeding, stroke, multi-organ failure, heart attack or other complications are risks for the proposed surgical procedure: Yes _____ No _____    A [facility] surgeon has explained to me that I have the right to cancel or postpone the surgery at any time prior to the start of surgery: Yes _____ No _____    The nature and options for treatment for my condition have been explained to me, in detail, by a [facility] surgeon and all questions have been answered to my satisfaction. I understand that I am not required to undergo surgery, and further, that if I so desire, I could have surgery accomplished by another surgeon or at another institution. I understand and accept that which has been explained to me.  I am able to make my decisions knowingly and willfully based on the data.    ______________________   __________________________________  Date       Patient Signature  ______________________________ Esthela Repine  Witness Consent Form   Patient Name: Hernan Rao: 12/22/1965                 Medical Record #: [id number]    Page: 2 of 2        Printed: August 4, 2022

## (undated) NOTE — LETTER
[facility] [address]  [place], [address] [facility]  Consent for Procedure/Sedation    Date: ***    Time: ***      1. I hereby authorize Dr Nelida Goel, my physician and his/her assistants (if applicable), which may include medical students, residents, and/or fellows, to perform the following surgical operation/ procedure and administer such anesthesia as may be determined necessary by my physician:  Operation/Procedure name (s) Cardiac Catheterization, Left Ventricular Cineangiography, Bilateral Selective Coronary Angiography and/or Right Heart Catheterization with SWAN-ANDREW placement; possible Percutaneous Transluminal Coronary Angioplasty, Coronary Atherectomy, Coronary Stent, Intracoronary Thrombolytic therapy, Antiplatelet therapy and/or Intravascular Ultrasound on [facility]  2. I recognize that during the surgical operation/procedure, unforeseen conditions may necessitate additional or different procedures than those listed above. I, therefore, further authorize and request that the above-named surgeon, assistants, or designees perform such procedures as are, in their judgment, necessary and desirable. 3.   My surgeon/physician has discussed prior to my surgery the potential benefits, risks and side effects of this procedure; the likelihood of achieving goals; and potential problems that might occur during recuperation. They also discussed reasonable alternatives to the procedure, including risks, benefits, and side effects related to the alternatives and risks related to not receiving this procedure. I have had all my questions answered and I acknowledge that no guarantee has been made as to the result that may be obtained. 4.   Should the need arise during my operation or immediate post-operative period, I also consent to the administration of blood and/or blood products.   Further, I understand that despite careful testing and screening of blood or blood products by collecting agencies, I may still be subject to ill effects as a result of receiving a blood transfusion and/or blood products. The following are some, but not all, of the potential risks that can occur: fever and allergic reactions, hemolytic reactions, transmission of diseases such as Hepatitis, AIDS and Cytomegalovirus (CMV) and fluid overload. In the event that I wish to have an autologous transfusion of my own blood, or a directed donor transfusion. I will discuss this with my physician. 5.   I authorize the use of any specimen, organs, tissues, body parts or foreign objects that may be removed from my body during the operation/procedure for diagnosis, research or teaching purposes and their subsequent disposal by hospital authorities. I also authorize the release of specimen test results and/or written reports to my treating physician on the hospital medical staff or other referring or consulting physicians involved in my care, at the discretion of the Pathologist or my treating physician. 6.   I consent to the photographing or videotaping of the operations or procedures to be performed, including appropriate portions of my body for medical, scientific, or educational purposes, provided my identity is not revealed by the pictures or by descriptive texts accompanying them. If the procedure has been photographed/videotaped, the surgeon will obtain the original picture, image, videotape or CD. The hospital will not be responsible for storage, release or maintenance of the picture, image, tape or CD.    7.   I consent to the presence of a  or observers in the operating room as deemed necessary by my physician or their designees. 8.   I recognize that in the event my procedure results in extended X-Ray/fluoroscopy time, I may develop a skin reaction. 9.  If I have a Do Not Attempt Resuscitation (DNAR) order in place, that status will be suspended while in the operating room, procedural suite, and during the recovery period unless otherwise explicitly stated by me (or a person authorized to consent on my behalf). The surgeon or my attending physician will determine when the applicable recovery period ends for purposes of reinstating the DNAR order. 10. Patients having a sterilization procedure: I understand that if the procedure is successful the results will be permanent and it will therefore be impossible for me to inseminate, conceive, or bear children. I also understand that the procedure is intended to result in sterility, although the result has not been guaranteed. 11. I acknowledge that my physician has explained sedation/analgesia administration to me including the risk and benefits I consent to the administration of sedation/analgesia as may be necessary or desirable in the judgment of my physician.     I CERTIFY THAT I HAVE READ AND FULLY UNDERSTAND THE ABOVE CONSENT TO OPERATION and/or OTHER PROCEDURE.      _________________________________________  __________________________________  Signature of Patient     Signature of Responsible Person         ___________________________________         Printed Name of Responsible Person           _________________________________                 Relationship to Patient  _________________________________________  ______________________________  Signature of Witness          Date  Time      Patient Name: Angelo Garza     : 1965                 Printed: August 3, 2022     Medical Record #: [id number]                     Page 1 of 2

## (undated) NOTE — LETTER
[facility] [address], [address] [place] [address]  Consent for Procedure/Sedation    Date: 8/4/2022    Time: 0730      1. I hereby authorize Dr Nelida Goel, my physician and his/her assistants (if applicable), which may include medical students, residents, and/or fellows, to perform the following surgical operation/ procedure and administer such anesthesia as may be determined necessary by my physician:  Operation/Procedure name (s) Mitral clip with possible SWAN-ANDREW placement on [place]  2. I recognize that during the surgical operation/procedure, unforeseen conditions may necessitate additional or different procedures than those listed above. I, therefore, further authorize and request that the above-named surgeon, assistants, or designees perform such procedures as are, in their judgment, necessary and desirable. 3.   My surgeon/physician has discussed prior to my surgery the potential benefits, risks and side effects of this procedure; the likelihood of achieving goals; and potential problems that might occur during recuperation. They also discussed reasonable alternatives to the procedure, including risks, benefits, and side effects related to the alternatives and risks related to not receiving this procedure. I have had all my questions answered and I acknowledge that no guarantee has been made as to the result that may be obtained. 4.   Should the need arise during my operation or immediate post-operative period, I also consent to the administration of blood and/or blood products. Further, I understand that despite careful testing and screening of blood or blood products by collecting agencies, I may still be subject to ill effects as a result of receiving a blood transfusion and/or blood products.   The following are some, but not all, of the potential risks that can occur: fever and allergic reactions, hemolytic reactions, transmission of diseases such as Hepatitis, AIDS and Cytomegalovirus (CMV) and fluid overload. In the event that I wish to have an autologous transfusion of my own blood, or a directed donor transfusion. I will discuss this with my physician. 5.   I authorize the use of any specimen, organs, tissues, body parts or foreign objects that may be removed from my body during the operation/procedure for diagnosis, research or teaching purposes and their subsequent disposal by hospital authorities. I also authorize the release of specimen test results and/or written reports to my treating physician on the hospital medical staff or other referring or consulting physicians involved in my care, at the discretion of the Pathologist or my treating physician. 6.   I consent to the photographing or videotaping of the operations or procedures to be performed, including appropriate portions of my body for medical, scientific, or educational purposes, provided my identity is not revealed by the pictures or by descriptive texts accompanying them. If the procedure has been photographed/videotaped, the surgeon will obtain the original picture, image, videotape or CD. The hospital will not be responsible for storage, release or maintenance of the picture, image, tape or CD.    7.   I consent to the presence of a  or observers in the operating room as deemed necessary by my physician or their designees. 8.   I recognize that in the event my procedure results in extended X-Ray/fluoroscopy time, I may develop a skin reaction. 9. If I have a Do Not Attempt Resuscitation (DNAR) order in place, that status will be suspended while in the operating room, procedural suite, and during the recovery period unless otherwise explicitly stated by me (or a person authorized to consent on my behalf). The surgeon or my attending physician will determine when the applicable recovery period ends for purposes of reinstating the DNAR order.   10. Patients having a sterilization procedure: I understand that if the procedure is successful the results will be permanent and it will therefore be impossible for me to inseminate, conceive, or bear children. I also understand that the procedure is intended to result in sterility, although the result has not been guaranteed. 11. I acknowledge that my physician has explained sedation/analgesia administration to me including the risk and benefits I consent to the administration of sedation/analgesia as may be necessary or desirable in the judgment of my physician.     I CERTIFY THAT I HAVE READ AND FULLY UNDERSTAND THE ABOVE CONSENT TO OPERATION and/or OTHER PROCEDURE.      _________________________________________  __________________________________  Signature of Patient     Signature of Responsible Person         ___________________________________         Printed Name of Responsible Person           _________________________________                 Relationship to Patient  _________________________________________  ______________________________  Signature of Witness          Date  Time      Patient Name: Mayra Lynn     : 1965                 Printed: 2022     Medical Record #: [id number]                     Page 1 of 1

## (undated) NOTE — LETTER
[facility] [address], [address] [place] [address]  Consent for Procedure/Sedation    Date: 08/03/2022   Time: 1048am      1. I hereby authorize Dr Sis Perez, my physician and his/her assistants (if applicable), which may include medical students, residents, and/or fellows, to perform the following surgical operation/ procedure and administer such anesthesia as may be determined necessary by my physician:  Operation/Procedure name (s) Transesophageal Echocardiogram on [place]  2. I recognize that during the surgical operation/procedure, unforeseen conditions may necessitate additional or different procedures than those listed above. I, therefore, further authorize and request that the above-named surgeon, assistants, or designees perform such procedures as are, in their judgment, necessary and desirable. 3.   My surgeon/physician has discussed prior to my surgery the potential benefits, risks and side effects of this procedure; the likelihood of achieving goals; and potential problems that might occur during recuperation. They also discussed reasonable alternatives to the procedure, including risks, benefits, and side effects related to the alternatives and risks related to not receiving this procedure. I have had all my questions answered and I acknowledge that no guarantee has been made as to the result that may be obtained. 4.   Should the need arise during my operation or immediate post-operative period, I also consent to the administration of blood and/or blood products. Further, I understand that despite careful testing and screening of blood or blood products by collecting agencies, I may still be subject to ill effects as a result of receiving a blood transfusion and/or blood products.   The following are some, but not all, of the potential risks that can occur: fever and allergic reactions, hemolytic reactions, transmission of diseases such as Hepatitis, AIDS and Cytomegalovirus (CMV) and fluid overload. In the event that I wish to have an autologous transfusion of my own blood, or a directed donor transfusion. I will discuss this with my physician. 5.   I authorize the use of any specimen, organs, tissues, body parts or foreign objects that may be removed from my body during the operation/procedure for diagnosis, research or teaching purposes and their subsequent disposal by hospital authorities. I also authorize the release of specimen test results and/or written reports to my treating physician on the hospital medical staff or other referring or consulting physicians involved in my care, at the discretion of the Pathologist or my treating physician. 6.   I consent to the photographing or videotaping of the operations or procedures to be performed, including appropriate portions of my body for medical, scientific, or educational purposes, provided my identity is not revealed by the pictures or by descriptive texts accompanying them. If the procedure has been photographed/videotaped, the surgeon will obtain the original picture, image, videotape or CD. The hospital will not be responsible for storage, release or maintenance of the picture, image, tape or CD.    7.   I consent to the presence of a  or observers in the operating room as deemed necessary by my physician or their designees. 8.   I recognize that in the event my procedure results in extended X-Ray/fluoroscopy time, I may develop a skin reaction. 9. If I have a Do Not Attempt Resuscitation (DNAR) order in place, that status will be suspended while in the operating room, procedural suite, and during the recovery period unless otherwise explicitly stated by me (or a person authorized to consent on my behalf). The surgeon or my attending physician will determine when the applicable recovery period ends for purposes of reinstating the DNAR order. 10. Patients having a sterilization procedure:  I understand that if the procedure is successful the results will be permanent and it will therefore be impossible for me to inseminate, conceive, or bear children. I also understand that the procedure is intended to result in sterility, although the result has not been guaranteed. 11. I acknowledge that my physician has explained sedation/analgesia administration to me including the risk and benefits I consent to the administration of sedation/analgesia as may be necessary or desirable in the judgment of my physician.     I CERTIFY THAT I HAVE READ AND FULLY UNDERSTAND THE ABOVE CONSENT TO OPERATION and/or OTHER PROCEDURE.      _________________________________________  __________________________________  Signature of Patient     Signature of Responsible Person         ___________________________________         Printed Name of Responsible Person           _________________________________                 Relationship to Patient  _________________________________________  ______________________________  Signature of Witness          Date  Time      Patient Name: Mercy Orta     : 1965                 Printed: August 3, 2022     Medical Record #: [id number]                     Page 1